# Patient Record
Sex: FEMALE | Race: WHITE | NOT HISPANIC OR LATINO | Employment: OTHER | URBAN - METROPOLITAN AREA
[De-identification: names, ages, dates, MRNs, and addresses within clinical notes are randomized per-mention and may not be internally consistent; named-entity substitution may affect disease eponyms.]

---

## 2017-02-07 DIAGNOSIS — Z12.31 ENCOUNTER FOR SCREENING MAMMOGRAM FOR MALIGNANT NEOPLASM OF BREAST: ICD-10-CM

## 2017-02-07 DIAGNOSIS — Z13.6 ENCOUNTER FOR SCREENING FOR CARDIOVASCULAR DISORDERS: ICD-10-CM

## 2017-02-07 DIAGNOSIS — L30.1 DYSHIDROSIS: ICD-10-CM

## 2017-02-07 DIAGNOSIS — Z00.00 ENCOUNTER FOR GENERAL ADULT MEDICAL EXAMINATION WITHOUT ABNORMAL FINDINGS: ICD-10-CM

## 2017-02-07 DIAGNOSIS — I10 ESSENTIAL (PRIMARY) HYPERTENSION: ICD-10-CM

## 2017-02-07 DIAGNOSIS — M15.9 POLYOSTEOARTHRITIS: ICD-10-CM

## 2017-02-07 DIAGNOSIS — B37.2 CANDIDIASIS OF SKIN AND NAIL: ICD-10-CM

## 2017-02-07 DIAGNOSIS — M54.9 DORSALGIA: ICD-10-CM

## 2017-02-15 ENCOUNTER — ALLSCRIPTS OFFICE VISIT (OUTPATIENT)
Dept: OTHER | Facility: OTHER | Age: 67
End: 2017-02-15

## 2017-03-29 ENCOUNTER — APPOINTMENT (OUTPATIENT)
Dept: LAB | Facility: CLINIC | Age: 67
End: 2017-03-29
Payer: MEDICARE

## 2017-03-29 ENCOUNTER — ALLSCRIPTS OFFICE VISIT (OUTPATIENT)
Dept: OTHER | Facility: OTHER | Age: 67
End: 2017-03-29

## 2017-03-29 ENCOUNTER — TRANSCRIBE ORDERS (OUTPATIENT)
Dept: ADMINISTRATIVE | Facility: HOSPITAL | Age: 67
End: 2017-03-29

## 2017-03-29 ENCOUNTER — HOSPITAL ENCOUNTER (OUTPATIENT)
Dept: RADIOLOGY | Facility: CLINIC | Age: 67
Discharge: HOME | End: 2017-03-29
Payer: MEDICARE

## 2017-03-29 DIAGNOSIS — M12.9 ARTHROPATHY: ICD-10-CM

## 2017-03-29 LAB
ERYTHROCYTE [SEDIMENTATION RATE] IN BLOOD: 26 MM/HOUR (ref 0–20)
URATE SERPL-MCNC: 7.4 MG/DL (ref 2–6.8)

## 2017-03-29 PROCEDURE — 86618 LYME DISEASE ANTIBODY: CPT

## 2017-03-29 PROCEDURE — 73610 X-RAY EXAM OF ANKLE: CPT

## 2017-03-29 PROCEDURE — 84550 ASSAY OF BLOOD/URIC ACID: CPT

## 2017-03-29 PROCEDURE — 36415 COLL VENOUS BLD VENIPUNCTURE: CPT

## 2017-03-29 PROCEDURE — 85652 RBC SED RATE AUTOMATED: CPT

## 2017-03-31 ENCOUNTER — GENERIC CONVERSION - ENCOUNTER (OUTPATIENT)
Dept: OTHER | Facility: OTHER | Age: 67
End: 2017-03-31

## 2017-03-31 LAB
B BURGDOR IGG SER IA-ACNC: 0.13
B BURGDOR IGM SER IA-ACNC: 0.15

## 2017-05-24 ENCOUNTER — GENERIC CONVERSION - ENCOUNTER (OUTPATIENT)
Dept: FAMILY MEDICINE CLINIC | Facility: CLINIC | Age: 67
End: 2017-05-24

## 2017-06-21 ENCOUNTER — HOSPITAL ENCOUNTER (OUTPATIENT)
Dept: MAMMOGRAPHY | Facility: CLINIC | Age: 67
Discharge: HOME | End: 2017-06-21
Payer: MEDICARE

## 2017-06-21 DIAGNOSIS — Z12.31 ENCOUNTER FOR SCREENING MAMMOGRAM FOR MALIGNANT NEOPLASM OF BREAST: ICD-10-CM

## 2017-06-21 PROCEDURE — 77080 DXA BONE DENSITY AXIAL: CPT

## 2017-06-21 PROCEDURE — G0202 SCR MAMMO BI INCL CAD: HCPCS

## 2017-06-27 ENCOUNTER — HOSPITAL ENCOUNTER (OUTPATIENT)
Dept: ULTRASOUND IMAGING | Facility: HOSPITAL | Age: 67
Discharge: HOME | End: 2017-06-27
Payer: MEDICARE

## 2017-06-27 DIAGNOSIS — Z13.6 SCREENING FOR AAA (ABDOMINAL AORTIC ANEURYSM): ICD-10-CM

## 2017-06-27 DIAGNOSIS — R92.8 OTHER ABNORMAL AND INCONCLUSIVE FINDINGS ON DIAGNOSTIC IMAGING OF BREAST: ICD-10-CM

## 2017-06-27 PROCEDURE — 76706 US ABDL AORTA SCREEN AAA: CPT

## 2017-06-30 ENCOUNTER — HOSPITAL ENCOUNTER (OUTPATIENT)
Dept: MAMMOGRAPHY | Facility: CLINIC | Age: 67
Discharge: HOME | End: 2017-06-30
Payer: MEDICARE

## 2017-06-30 ENCOUNTER — HOSPITAL ENCOUNTER (OUTPATIENT)
Dept: ULTRASOUND IMAGING | Facility: CLINIC | Age: 67
Discharge: HOME | End: 2017-06-30
Payer: MEDICARE

## 2017-06-30 DIAGNOSIS — R92.8 OTHER ABNORMAL AND INCONCLUSIVE FINDINGS ON DIAGNOSTIC IMAGING OF BREAST: ICD-10-CM

## 2017-06-30 PROCEDURE — 76642 ULTRASOUND BREAST LIMITED: CPT

## 2017-06-30 PROCEDURE — G0206 DX MAMMO INCL CAD UNI: HCPCS

## 2017-06-30 PROCEDURE — G0279 TOMOSYNTHESIS, MAMMO: HCPCS

## 2017-07-03 ENCOUNTER — GENERIC CONVERSION - ENCOUNTER (OUTPATIENT)
Dept: OTHER | Facility: OTHER | Age: 67
End: 2017-07-03

## 2017-07-20 ENCOUNTER — ALLSCRIPTS OFFICE VISIT (OUTPATIENT)
Dept: OTHER | Facility: OTHER | Age: 67
End: 2017-07-20

## 2017-07-31 ENCOUNTER — GENERIC CONVERSION - ENCOUNTER (OUTPATIENT)
Dept: FAMILY MEDICINE CLINIC | Facility: CLINIC | Age: 67
End: 2017-07-31

## 2017-08-30 ENCOUNTER — GENERIC CONVERSION - ENCOUNTER (OUTPATIENT)
Dept: FAMILY MEDICINE CLINIC | Facility: CLINIC | Age: 67
End: 2017-08-30

## 2017-09-29 ENCOUNTER — GENERIC CONVERSION - ENCOUNTER (OUTPATIENT)
Dept: FAMILY MEDICINE CLINIC | Facility: CLINIC | Age: 67
End: 2017-09-29

## 2017-10-20 ENCOUNTER — ALLSCRIPTS OFFICE VISIT (OUTPATIENT)
Dept: OTHER | Facility: OTHER | Age: 67
End: 2017-10-20

## 2017-10-20 DIAGNOSIS — I10 ESSENTIAL (PRIMARY) HYPERTENSION: ICD-10-CM

## 2017-10-20 DIAGNOSIS — Z12.11 ENCOUNTER FOR SCREENING FOR MALIGNANT NEOPLASM OF COLON: ICD-10-CM

## 2017-10-20 DIAGNOSIS — Z23 ENCOUNTER FOR IMMUNIZATION: ICD-10-CM

## 2017-10-21 NOTE — PROGRESS NOTES
Assessment  1  Benign essential hypertension (401 1) (I10)   2  Generalized osteoarthritis (715 00) (M15 9)   3  Gout (274 9) (M10 9)   4  Flu vaccine need (V04 81) (Z23)    Plan  Benign essential hypertension, Flu vaccine need    · (1) CBC/PLT/DIFF; Status:Active; Requested ZTC:[id number];    · (1) LIPID PANEL, FASTING; Status:Active; Requested for:20Oct2017;    · (1) TSH WITH FT4 REFLEX; Status:Active; Requested for:20Oct2017;   Colon cancer screening    · (1) OCCULT BLOOD, FECAL IMMUNOCHEMICAL TEST; Status:Active; Requested  GXM:15SSR0961;   Flu vaccine need    · (1) COMPREHENSIVE METABOLIC PANEL; Status:Active; Requested for:20Oct2017;    · (1) URINALYSIS w URINE C/S REFLEX (will reflex a microscopy if leukocytes, occult  blood, or nitrites are not within normal limits); Status:Active; Requested for:20Oct2017;    · Fluzone High-Dose 0 5 ML Intramuscular Suspension Prefilled Syringe    Discussion/Summary    Doing wellon health maintenancetest ordered todayshot given todaycurrent medicationsin 6 months  Chief Complaint  3 month follow up for HTN  Patient is here today for follow up of chronic conditions described in HPI  History of Present Illness  here for 3 month checkmuch better since hip replacementother complaints that      Review of Systems    Constitutional: No fever, no chills, feels well, no tiredness, no recent weight gain or weight loss  Eyes: No complaints of eye pain, no red eyes, no eyesight problems, no discharge, no dry eyes, no itching of eyes  ENT: no complaints of earache, no loss of hearing, no nose bleeds, no nasal discharge, no sore throat, no hoarseness  Cardiovascular: No complaints of slow heart rate, no fast heart rate, no chest pain, no palpitations, no leg claudication, no lower extremity edema  Respiratory: No complaints of shortness of breath, no wheezing, no cough, no SOB on exertion, no orthopnea, no PND     Gastrointestinal: No complaints of abdominal pain, no constipation, no nausea or vomiting, no diarrhea, no bloody stools  Genitourinary: No complaints of dysuria, no incontinence, no pelvic pain, no dysmenorrhea, no vaginal discharge or bleeding  Musculoskeletal: as noted in HPI  Integumentary: No complaints of skin rash or lesions, no itching, no skin wounds, no breast pain or lump  Neurological: No complaints of headache, no confusion, no convulsions, no numbness, no dizziness or fainting, no tingling, no limb weakness, no difficulty walking  Psychiatric: Not suicidal, no sleep disturbance, no anxiety or depression, no change in personality, no emotional problems  Endocrine: No complaints of proptosis, no hot flashes, no muscle weakness, no deepening of the voice, no feelings of weakness  Hematologic/Lymphatic: No complaints of swollen glands, no swollen glands in the neck, does not bleed easily, does not bruise easily  Active Problems  1  Abnormal mammogram (793 80) (R92 8)   2  Acute arthropathy (716 90) (M12 9)   3  Back pain, chronic (724 5,338 29) (M54 9,G89 29)   4  Benign essential hypertension (401 1) (I10)   5  Candidal intertrigo (112 3) (B37 2)   6  Chronic sinusitis (473 9) (J32 9)   7  Dyshidrotic eczema (705 81) (L30 1)   8  Generalized osteoarthritis (715 00) (M15 9)   9  Gout (274 9) (M10 9)   10  Hip pain (719 45) (M25 559)   11  Need for Zostavax administration (V04 89) (Z23)   12  Screening for AAA (abdominal aortic aneurysm) (V81 2) (Z13 6)    Past Medical History  1  History of Acute recurrent sinusitis, unspecified location (461 9) (J01 91)   2  Acute sinusitis (461 9) (J01 90)    The active problems and past medical history were reviewed and updated today  Surgical History  1  History of Knee Replacement    The surgical history was reviewed and updated today  Family History  Mother    1  Family history of hypertension (V17 49) (Z82 49)  Family History    2  Family history of Aneurysm Of Abdominal Aorta   3   Family history of Brain Cancer (V16 8)   4  Family history of Hypertension (V17 49)   5  Family history of Stroke Syndrome (V17 1)    The family history was reviewed and updated today  Social History   · Current Every Day Smoker (305 1)  The social history was reviewed and updated today  The social history was reviewed and is unchanged  Current Meds   1  Allopurinol 100 MG Oral Tablet; Take 1 tablet daily; Therapy: 60JRA1219 to (Evaluate:01Apr2018)  Requested for: 93BRS2773; Last   Rx:03Oct2017 Ordered   2  CeleBREX 100 MG Oral Capsule; TAKE 1 CAPSULE TWICE DAILY; Therapy: (Recorded:20Oct2017) to Recorded   3  Clotrimazole-Betamethasone 1-0 05 % External Cream; APPLY SPARINGLY TO THE   AFFECTED AREA(S) TWICE DAILY; Therapy: 86ZDU0738 to (Evaluate:27Wtr1497)  Requested for: 28HRB4058; Last   Rx:29Nov2016 Ordered   4  [facility] Fish Oil 1000 MG Oral Capsule; TAKE 1 CAPSULE Daily Recorded   5  Daily Multiple Vitamins TABS; Take 1 tablet daily Recorded   6  Nystatin 618103 UNIT/GM External Powder; APPLY TO AFFECTED AREA TWICE DAILY AS   DIRECTED; Therapy: 64RXF5128 to (Evaluate:29Mar2017)  Requested for: 34BIJ4779; Last   Rx:29Nov2016 Ordered   7  Zostavax 11800 UNT/0 65ML Subcutaneous Solution Reconstituted; INJECT 0 5 ML   Once; Therapy: 44ZJA3263 to (Evaluate:05Pyq5960); Last Rx:88Fqj9880 Ordered    The medication list was reviewed and updated today  Allergies  1  No Known Drug Allergies    Vitals  Vital Signs    Recorded: 21KFK0779 02:43PM   Temperature 97 8 F, Tympanic   Heart Rate 78   Respiration 16   Systolic 972, LUE, Sitting   Diastolic 84, LUE, Sitting   Height 5 ft 5 in   Weight 143 lb    BMI Calculated 23 8   BSA Calculated 1 72     Physical Exam    Constitutional   General appearance: No acute distress, well appearing and well nourished  Ears, Nose, Mouth, and Throat   Otoscopic examination: Tympanic membranes translucent with normal light reflex  Canals patent without erythema  Oropharynx: Normal with no erythema, edema, exudate or lesions  Pulmonary   Respiratory effort: No increased work of breathing or signs of respiratory distress  Auscultation of lungs: Clear to auscultation  Cardiovascular   Auscultation of heart: Normal rate and rhythm, normal S1 and S2, without murmurs  Carotid pulses: Normal     Abdomen   Abdomen: Non-tender, no masses  Liver and spleen: No hepatomegaly or splenomegaly  Musculoskeletal   Gait and station: Normal     Digits and nails: Normal without clubbing or cyanosis  Inspection/palpation of joints, bones, and muscles: Normal     Skin   Skin and subcutaneous tissue: Normal without rashes or lesions  Neurologic   Cranial nerves: Cranial nerves 2-12 intact  Reflexes: 2+ and symmetric  Sensation: No sensory loss           Future Appointments    Date/Time Provider Specialty Site   04/20/2018 02:15 PM Damaris Minor DO Family Medicine [facility]     Signatures   Electronically signed by : Sujey Peter DO; Oct 20 2017  4:48PM EST                       (Author)

## 2017-11-22 ENCOUNTER — GENERIC CONVERSION - ENCOUNTER (OUTPATIENT)
Dept: OTHER | Facility: OTHER | Age: 67
End: 2017-11-22

## 2018-01-12 NOTE — PROGRESS NOTES
Assessment    1  Encounter for preventive health examination (V70 0) (Z00 00)   2  Need for pneumococcal vaccination (V03 82) (Z23)    Plan  Back pain, chronic, Benign essential hypertension, Candidal intertrigo, Dyshidrotic  eczema, Generalized osteoarthritis    · (1) COMPREHENSIVE METABOLIC PANEL; Status:Canceled;   Back pain, chronic, Benign essential hypertension, Candidal intertrigo, Dyshidrotic  eczema, Generalized osteoarthritis, Screening for cardiovascular condition    · (1) LIPID PANEL, FASTING; Status:Canceled; Benign essential hypertension, Chronic sinusitis, PMH: Encounter for screening for  cardiovascular disorders, PMH: Encounter for screening mammogram for breast cancer,  Generalized osteoarthritis, Hip pain, unspecified laterality    · (1) CBC/PLT/DIFF; Status:Canceled;    · (1) COMPREHENSIVE METABOLIC PANEL; Status:Canceled;    · (1) LIPID PANEL, FASTING; Status:Canceled;    · (1) TSH WITH FT4 REFLEX; Status:Canceled;    · (1) URINALYSIS w URINE C/S REFLEX (will reflex a microscopy if leukocytes, occult  blood, or nitrites are not within normal limits); Status:Canceled; Benign essential hypertension, PMH: Encounter for screening for cardiovascular  disorders    · (1) CBC/PLT/DIFF; Status:Canceled;    · (1) COMPREHENSIVE METABOLIC PANEL; Status:Canceled;    · (1) LIPID PANEL, FASTING; Status:Canceled;    · (1) TSH WITH FT4 REFLEX; Status:Canceled;    · (1) URINALYSIS w URINE C/S REFLEX (will reflex a microscopy if leukocytes, occult  blood, or nitrites are not within normal limits); Status:Canceled;   Chronic sinusitis    · Amoxicillin-Pot Clavulanate 875-125 MG Oral Tablet (Augmentin); TAKE 1 TABLET  EVERY 12 HOURS DAILY  Encounter for screening mammogram for breast cancer    · * DXA BONE DENSITY SPINE HIP AND PELVIS; Status:Active; Requested  for:94Ohd0186;    · * MAMMO SCREENING BILATERAL W CAD; Status:Active;  Requested for:34Oiw9784;   Laboratory examination ordered as part of a complete physical examination    · (1) CBC/PLT/DIFF; Status:Active; Requested for:57Uzb0877;    · (1) COMPREHENSIVE METABOLIC PANEL; Status:Active; Requested for:15Feb2017;    · (1) LIPID PANEL, FASTING; Status:Active; Requested for:15Feb2017;    · (1) TSH WITH FT4 REFLEX; Status:Active; Requested for:15Feb2017;    · (1) URINALYSIS w URINE C/S REFLEX (will reflex a microscopy if leukocytes, occult  blood, or nitrites are not within normal limits); Status:Active; Requested for:15Feb2017;   Need for pneumococcal vaccination    · Prevnar 13 Intramuscular Suspension  Need for Zostavax administration    · Zostavax 51185 UNT/0 65ML Subcutaneous Solution Reconstituted (Zostavax  90888 UNT/0 65ML Subcutaneous Solution Reconstituted); INJECT 0 5 ML Once  PMH: Encounter for screening mammogram for breast cancer    · * MAMMO SCREENING BILATERAL W CAD; Status:Canceled;   Screening for AAA (abdominal aortic aneurysm)    · VAS CMS AAA SCREENING; Status:Active; Requested for:15Feb2017;     Discussion/Summary    Labs ordered again  dexa ordered   mammo ordered   refuising colonoscopy  u/s for anurysm screening ordered  needs to see eye visit  augmentin sent ot pharmacy for sinuisitis  f/u in 6 months  prevnar given today  zostavax script printed  Impression: Welcome to Medicare Visit, with preventive exam as well as age and risk appropriate counseling completed  Cardiovascular screening and counseling: was ordered but patient did not get yet  Diabetes screening and counseling: was ordered but did not get yet  Colorectal cancer screening and counseling: the patient declines screening  Breast cancer screening and counseling: due for a screening mammogram and ordered today  Cervical cancer screening and counseling: the patient declines screening  Osteoporosis screening and counseling: ordered today  Abdominal aortic aneurysm screening and counseling: screening US recommended     Glaucoma screening and counseling: needs to see optho-- [facility]  HIV screening and counseling: screening not indicated  Immunizations: influenza vaccine is up to date this year, the lifetime pneumococcal vaccine has been completed, hepatitis B vaccination series is not indicated at this time due to the patient's low risk of sofiya the disease, Zostavax vaccine needed today, Td vaccination status is unknown and Tdap vaccination status is unknown  Advance Directive Planning: not complete, she was encouraged to follow-up with me to discuss her questions and/or decisions  Advice and education were given regarding fall risk reduction, increasing physical activity and nutrition (non-diabetic)  She was referred to none needed  Patient Discussion: follow-up visit needed in 6 months  Chief Complaint  Welcome to Medicare Wellness Exam 73year old  Advance Directives  Advance Directive [facility]:   NO - Patient does not have an advance health care directive  History of Present Illness  HPI: here for welcome to medicare physical  having some sinus issues today   Welcome to [employer] and Wellness Visits: The patient is being seen for the welcome to medicare visit  Medicare Screening and Risk Factors   Hospitalizations: she has been hospitalized 0 times  Medicare Screening Tests Risk Questions   Abdominal aortic aneurysm risk assessment: none indicated  Osteoporosis risk assessment: , female gender, over 48years of age and tobacco use  HIV risk assessment: none indicated  Drug and Alcohol Use: The patient is a current cigarette smoker  She has smoked for 50+ year(s)  She is cutting back on tobacco use  The patient reports rare alcohol use  She has never used illicit drugs  Diet and Physical Activity: Current diet includes well balanced meals and limited junk food  The patient does not exercise  Mood Disorder and Cognitive Impairment Screening:   Depression screening  negative for symptoms   She denies feeling down, depressed, or hopeless over the past two weeks  She denies feeling little interest or pleasure in doing things over the past two weeks  Cognitive impairment screening: denies difficulty learning/retaining new information, denies difficulty handling complex tasks, denies difficulty with reasoning, denies difficulty with spatial ability and orientation, denies difficulty with language and denies difficulty with behavior  Functional Ability/Level of Safety: Hearing is normal bilaterally, normal in the right ear, normal in the left ear and a hearing aid is not used  The patient is currently able to do activities of daily living without limitations, able to do instrumental activities of daily living without limitations, able to participate in social activities without limitations and able to drive without limitations  Activities of daily living details: does not need help using the phone, no transportation help needed, does not need help shopping, no meal preparation help needed, does not need help doing housework, does not need help doing laundry, does not need help managing medications and does not need help managing money  Fall risk factors:  mobility impairment, but The patient fell 0 times in the past 12 months , no polypharmacy, no alcohol use, no antidepressant use, no deconditioning, no postural hypotension, no sedative use, no visual impairment, no urinary incontinence, no antihypertensive use, no cognitive impairment, up and go test was normal and no previous fall  Home safety risk factors:  no unfamiliar surroundings, no loose rugs, no uneven floors, no household clutter, grab bars in the bathroom and handrails on the stairs  Advance Directives: Advance directives: no living will, no durable power of  for health care directives and no advance directives  end of life decisions were reviewed with the patient and I agree with the patient's decisions     Co-Managers and Medical Equipment/Suppliers: See Patient Care Team   Preventive Quality Program 65 and Older: Falls Risk: The patient fell 0 times in the past 12 months  Review of Systems    Constitutional: negative  Eyes: negative  ENT: negative  Cardiovascular: negative  Respiratory: negative  Gastrointestinal: negative  Genitourinary: negative  Musculoskeletal: negative  Integumentary and Breasts: negative  Neurological: negative  Psychiatric: negative  Endocrine: negative  Hematologic and Lymphatic: negative  Active Problems    1  Back pain, chronic (724 5,338 29) (M54 9,G89 29)   2  Benign essential hypertension (401 1) (I10)   3  Candidal intertrigo (112 3) (B37 2)   4  Chronic sinusitis (473 9) (J32 9)   5  Dyshidrotic eczema (705 81) (L30 1)   6  Generalized osteoarthritis (715 00) (M15 9)   7  Hip pain (719 45) (M25 559)   8  Hip pain, unspecified laterality   9  Screening for cardiovascular condition (V81 2) (Z13 6)    Past Medical History    · History of Acute recurrent sinusitis, unspecified location (461 9) (J01 91)   · Acute sinusitis (461 9) (J01 90)    The active problems and past medical history were reviewed and updated today  Surgical History    · History of Knee Replacement    The surgical history was reviewed and updated today  Family History  Mother    · Family history of hypertension (V17 49) (Z82 49)  Family History    · Family history of Aneurysm Of Abdominal Aorta   · Family history of Brain Cancer (V16 8)   · Family history of Hypertension (V17 49)   · Family history of Stroke Syndrome (V17 1)    The family history was reviewed and updated today  Social History    · Current Every Day Smoker (305 1)  The social history was reviewed and updated today  The social history was reviewed and is unchanged  Current Meds   1  Celecoxib 200 MG Oral Capsule; TAKE 1 CAPSULE Daily  Requested for: 23Nov2016;   Last Rx:23Nov2016 Ordered   2   Clotrimazole-Betamethasone 1-0 05 % External Cream; APPLY SPARINGLY TO THE   AFFECTED AREA(S) TWICE DAILY; Therapy: 52BTN4717 to (Evaluate:31Ama8485)  Requested for: 06NWG4125; Last   Rx:29Nov2016 Ordered   3  [facility] Fish Oil 1000 MG Oral Capsule; TAKE 1 CAPSULE Daily Recorded   4  Cyclobenzaprine HCl - 5 MG Oral Tablet; TAKE 1 TABLET Bedtime; Therapy: 66Voe4813 to (Evaluate:24Mar2017)  Requested for: 89NRW6624; Last   Rx:23Jan2017 Ordered   5  Daily Multiple Vitamins TABS; Take 1 tablet daily Recorded   6  Irbesartan 150 MG Oral Tablet; Take 1 tablet daily  Requested for: 23Nov2016; Last   Rx:23Nov2016 Ordered   7  Nystatin 953460 UNIT/GM External Powder; APPLY TO AFFECTED AREA TWICE DAILY   AS DIRECTED; Therapy: 19POY8767 to (Evaluate:29Mar2017)  Requested for: 51FUZ3217; Last   Rx:29Nov2016 Ordered    The medication list was reviewed and updated today  Allergies    1  No Known Drug Allergies    Immunizations   1 2    Influenza  01-Oct-2013 25-Oct-2016     Vitals  Signs    Heart Rate: 88  Systolic: 634, RUE, Sitting  Diastolic: 76, RUE, Sitting  Height: 5 ft 5 in  Weight: 148 lb 8 0 oz  BMI Calculated: 24 71  BSA Calculated: 1 75    Physical Exam    Constitutional   General appearance: No acute distress, well appearing and well nourished  Eyes   Conjunctiva and lids: No swelling, erythema or discharge  Pupils and irises: Equal, round, reactive to light  Ears, Nose, Mouth, and Throat   External inspection of ears and nose: Normal     Otoscopic examination: Tympanic membranes translucent with normal light reflex  Canals patent without erythema  Hearing: Normal     Nasal mucosa, septum, and turbinates: Normal without edema or erythema  Lips, teeth, and gums: Normal, good dentition  Oropharynx: Normal with no erythema, edema, exudate or lesions  Neck   Neck: Supple, symmetric, trachea midline, no masses  Thyroid: Normal, no thyromegaly      Pulmonary   Respiratory effort: No increased work of breathing or signs of respiratory distress  Auscultation of lungs: Clear to auscultation  Cardiovascular   Auscultation of heart: Normal rate and rhythm, normal S1 and S2, no murmurs  Carotid pulses: 2+ bilaterally  Examination of extremities for edema and/or varicosities: Normal     Abdomen   Abdomen: Non-tender, no masses  Liver and spleen: No hepatomegaly or splenomegaly  Lymphatic   Palpation of lymph nodes in neck: No lymphadenopathy  Musculoskeletal   Gait and station: Normal     Digits and nails: Normal without clubbing or cyanosis  Joints, bones, and muscles: Normal     Range of motion: Normal     Stability: Normal     Muscle strength/tone: Normal     Skin   Skin and subcutaneous tissue: Normal without rashes or lesions  Neurologic   Cranial nerves: Cranial nerves II-XII intact  Cortical function: Normal mental status  Psychiatric   Judgment and insight: Normal     Orientation to person, place, and time: Normal     Recent and remote memory: Intact  Mood and affect: Normal        Procedure    Procedure: Visual Acuity Test    Indication: routine screening  Inforrmation supplied by a Snellen chart  Results: 20/25 in the right eye with corrective device, 20/25 in the left eye with corrective device Wears glasses   The patient was cooperative        Future Appointments    Date/Time Provider Specialty Site   08/16/2017 02:30 PM Edna Minor DO Family Medicine [facility]     Signatures   Electronically signed by : Erik Wilkins DO; Feb 15 2017  4:19PM EST                       (Author)

## 2018-01-12 NOTE — MISCELLANEOUS
Message  patient notified of labs and xray  shows eleavted sed rate and uric acid   xray shows djd of ankle  finish prednisone taper and start allopurinol       Plan  Gout    · Allopurinol 100 MG Oral Tablet;  Take 1 tablet daily    Signatures   Electronically signed by : Yo Isbell DO; Mar 31 2017 12:23PM EST                       (Author)

## 2018-01-13 VITALS
DIASTOLIC BLOOD PRESSURE: 76 MMHG | BODY MASS INDEX: 22.82 KG/M2 | TEMPERATURE: 99.5 F | HEIGHT: 65 IN | WEIGHT: 137 LBS | HEART RATE: 90 BPM | SYSTOLIC BLOOD PRESSURE: 114 MMHG

## 2018-01-13 VITALS
WEIGHT: 148.5 LBS | DIASTOLIC BLOOD PRESSURE: 76 MMHG | BODY MASS INDEX: 24.74 KG/M2 | SYSTOLIC BLOOD PRESSURE: 120 MMHG | HEIGHT: 65 IN | HEART RATE: 88 BPM

## 2018-01-14 VITALS
WEIGHT: 143 LBS | HEART RATE: 78 BPM | SYSTOLIC BLOOD PRESSURE: 124 MMHG | TEMPERATURE: 97.8 F | RESPIRATION RATE: 16 BRPM | DIASTOLIC BLOOD PRESSURE: 84 MMHG | HEIGHT: 65 IN | BODY MASS INDEX: 23.82 KG/M2

## 2018-01-14 VITALS
BODY MASS INDEX: 24.32 KG/M2 | WEIGHT: 146 LBS | SYSTOLIC BLOOD PRESSURE: 120 MMHG | HEIGHT: 65 IN | DIASTOLIC BLOOD PRESSURE: 78 MMHG | HEART RATE: 104 BPM

## 2018-01-16 NOTE — MISCELLANEOUS
Message  patient notified of mammo -- u/s showed likely benign  and will need f/u in 6 months  dexa shwoed osteopenia and we will discuss at next visit        Signatures   Electronically signed by : Darlyn Huber DO; Jul  3 2017  4:36PM EST                       (Author)

## 2018-02-01 DIAGNOSIS — M15.9 PRIMARY OSTEOARTHRITIS INVOLVING MULTIPLE JOINTS: Primary | ICD-10-CM

## 2018-02-01 RX ORDER — CELECOXIB 100 MG/1
100 CAPSULE ORAL 2 TIMES DAILY
Qty: 60 CAPSULE | Refills: 3 | Status: SHIPPED | OUTPATIENT
Start: 2018-02-01 | End: 2018-06-06 | Stop reason: SDUPTHER

## 2018-04-09 DIAGNOSIS — M10.9 GOUT, UNSPECIFIED CAUSE, UNSPECIFIED CHRONICITY, UNSPECIFIED SITE: Primary | ICD-10-CM

## 2018-04-09 RX ORDER — ALLOPURINOL 100 MG/1
100 TABLET ORAL DAILY
Qty: 90 TABLET | Refills: 1 | Status: SHIPPED | OUTPATIENT
Start: 2018-04-09 | End: 2018-10-09 | Stop reason: SDUPTHER

## 2018-04-09 RX ORDER — ALLOPURINOL 100 MG/1
1 TABLET ORAL DAILY
COMMUNITY
Start: 2017-03-31 | End: 2018-04-09 | Stop reason: SDUPTHER

## 2018-04-13 ENCOUNTER — OFFICE VISIT (OUTPATIENT)
Dept: FAMILY MEDICINE CLINIC | Facility: CLINIC | Age: 68
End: 2018-04-13
Payer: MEDICARE

## 2018-04-13 VITALS
DIASTOLIC BLOOD PRESSURE: 92 MMHG | HEIGHT: 65 IN | HEART RATE: 88 BPM | BODY MASS INDEX: 23.82 KG/M2 | SYSTOLIC BLOOD PRESSURE: 140 MMHG | WEIGHT: 143 LBS

## 2018-04-13 DIAGNOSIS — I10 ESSENTIAL HYPERTENSION: Primary | ICD-10-CM

## 2018-04-13 DIAGNOSIS — M10.9 GOUT, UNSPECIFIED CAUSE, UNSPECIFIED CHRONICITY, UNSPECIFIED SITE: ICD-10-CM

## 2018-04-13 DIAGNOSIS — R92.8 ABNORMAL MAMMOGRAM: ICD-10-CM

## 2018-04-13 PROBLEM — M12.9 ACUTE ARTHROPATHY: Status: ACTIVE | Noted: 2017-03-29

## 2018-04-13 PROCEDURE — 99214 OFFICE O/P EST MOD 30 MIN: CPT | Performed by: FAMILY MEDICINE

## 2018-04-13 NOTE — PROGRESS NOTES
Assessment/Plan:       Diagnoses and all orders for this visit:    Essential hypertension    Abnormal mammogram  -     Mammo diagnostic left w cad; Future    Gout, unspecified cause, unspecified chronicity, unspecified site        Up-to-date on health maintenance  Will order left diagnostic mammo  Current 10 U current medications  Follow-up in 6 months or as needed    Subjective:     Chief Complaint   Patient presents with    Follow-up     Check up 6 months         Patient ID: Tk Muller is a 80 y o  female  Here for six-month checkup chronic conditions  No acute complaints today  Patient is feeling very well  Needs x-ray for left diagnostic mammo due to recent abnormal regular mammo        The following portions of the patient's history were reviewed and updated as appropriate: allergies, current medications, past family history, past medical history, past social history, past surgical history and problem list     Review of Systems   Constitutional: Negative  HENT: Negative  Eyes: Negative  Respiratory: Negative  Cardiovascular: Negative  Gastrointestinal: Negative  Endocrine: Negative  Genitourinary: Negative  Musculoskeletal: Negative  Skin: Negative  Allergic/Immunologic: Negative  Neurological: Negative  Hematological: Negative  Psychiatric/Behavioral: Negative  All other systems reviewed and are negative  Objective:    Vitals:    04/13/18 1345   BP: 140/92   BP Location: Left arm   Patient Position: Sitting   Cuff Size: Standard   Pulse: 88   Weight: 64 9 kg (143 lb)   Height: 5' 5" (1 651 m)          Physical Exam   Constitutional: She is oriented to person, place, and time  She appears well-developed and well-nourished  HENT:   Head: Normocephalic and atraumatic     Right Ear: External ear normal    Left Ear: External ear normal    Mouth/Throat: Oropharynx is clear and moist    Eyes: Conjunctivae and EOM are normal  Pupils are equal, round, and reactive to light  Neck: Normal range of motion  Cardiovascular: Normal rate, regular rhythm and normal heart sounds  Pulmonary/Chest: Effort normal and breath sounds normal    Abdominal: Soft  Bowel sounds are normal    Musculoskeletal: Normal range of motion  Neurological: She is alert and oriented to person, place, and time  She has normal reflexes  Skin: Skin is warm and dry  Psychiatric: She has a normal mood and affect  Her behavior is normal  Judgment and thought content normal    Nursing note and vitals reviewed

## 2018-06-06 DIAGNOSIS — M15.9 PRIMARY OSTEOARTHRITIS INVOLVING MULTIPLE JOINTS: ICD-10-CM

## 2018-06-06 RX ORDER — CELECOXIB 100 MG/1
100 CAPSULE ORAL 2 TIMES DAILY
Qty: 180 CAPSULE | Refills: 1 | Status: SHIPPED | OUTPATIENT
Start: 2018-06-06 | End: 2018-10-15 | Stop reason: SDUPTHER

## 2018-10-09 DIAGNOSIS — M10.9 GOUT, UNSPECIFIED CAUSE, UNSPECIFIED CHRONICITY, UNSPECIFIED SITE: ICD-10-CM

## 2018-10-09 RX ORDER — ALLOPURINOL 100 MG/1
100 TABLET ORAL DAILY
Qty: 90 TABLET | Refills: 0 | Status: SHIPPED | OUTPATIENT
Start: 2018-10-09 | End: 2019-01-09 | Stop reason: SDUPTHER

## 2018-10-15 DIAGNOSIS — M15.9 PRIMARY OSTEOARTHRITIS INVOLVING MULTIPLE JOINTS: ICD-10-CM

## 2018-10-15 RX ORDER — CELECOXIB 100 MG/1
100 CAPSULE ORAL 2 TIMES DAILY
Qty: 180 CAPSULE | Refills: 1 | Status: SHIPPED | OUTPATIENT
Start: 2018-10-15 | End: 2019-02-21 | Stop reason: SDUPTHER

## 2018-10-17 ENCOUNTER — OFFICE VISIT (OUTPATIENT)
Dept: FAMILY MEDICINE CLINIC | Facility: CLINIC | Age: 68
End: 2018-10-17
Payer: MEDICARE

## 2018-10-17 VITALS
RESPIRATION RATE: 14 BRPM | HEART RATE: 91 BPM | SYSTOLIC BLOOD PRESSURE: 142 MMHG | OXYGEN SATURATION: 98 % | HEIGHT: 65 IN | DIASTOLIC BLOOD PRESSURE: 92 MMHG | WEIGHT: 141.2 LBS | TEMPERATURE: 98 F | BODY MASS INDEX: 23.53 KG/M2

## 2018-10-17 DIAGNOSIS — Z23 NEED FOR INFLUENZA VACCINATION: ICD-10-CM

## 2018-10-17 DIAGNOSIS — I10 ESSENTIAL HYPERTENSION: ICD-10-CM

## 2018-10-17 DIAGNOSIS — Z13.6 SCREENING FOR CARDIOVASCULAR CONDITION: ICD-10-CM

## 2018-10-17 DIAGNOSIS — M10.9 GOUT, UNSPECIFIED CAUSE, UNSPECIFIED CHRONICITY, UNSPECIFIED SITE: ICD-10-CM

## 2018-10-17 DIAGNOSIS — Z23 NEED FOR PNEUMOCOCCAL VACCINATION: ICD-10-CM

## 2018-10-17 DIAGNOSIS — Z23 NEED FOR SHINGLES VACCINE: ICD-10-CM

## 2018-10-17 DIAGNOSIS — M15.9 GENERALIZED OSTEOARTHRITIS: ICD-10-CM

## 2018-10-17 DIAGNOSIS — Z12.39 SCREENING FOR BREAST CANCER: ICD-10-CM

## 2018-10-17 DIAGNOSIS — Z00.00 MEDICARE ANNUAL WELLNESS VISIT, INITIAL: Primary | ICD-10-CM

## 2018-10-17 PROCEDURE — G0008 ADMIN INFLUENZA VIRUS VAC: HCPCS | Performed by: FAMILY MEDICINE

## 2018-10-17 PROCEDURE — 90662 IIV NO PRSV INCREASED AG IM: CPT | Performed by: FAMILY MEDICINE

## 2018-10-17 PROCEDURE — G0009 ADMIN PNEUMOCOCCAL VACCINE: HCPCS | Performed by: FAMILY MEDICINE

## 2018-10-17 PROCEDURE — G0438 PPPS, INITIAL VISIT: HCPCS | Performed by: FAMILY MEDICINE

## 2018-10-17 PROCEDURE — 99214 OFFICE O/P EST MOD 30 MIN: CPT | Performed by: FAMILY MEDICINE

## 2018-10-17 PROCEDURE — 90732 PPSV23 VACC 2 YRS+ SUBQ/IM: CPT | Performed by: FAMILY MEDICINE

## 2018-10-17 NOTE — PROGRESS NOTES
Assessment/Plan:       Diagnoses and all orders for this visit:      Essential hypertension    Gout, unspecified cause, unspecified chronicity, unspecified site  -     Uric acid; Future    Generalized osteoarthritis    Screening for cardiovascular condition  -     CBC and differential; Future  -     Comprehensive metabolic panel; Future  -     Lipid panel; Future  -     TSH, 3rd generation with Free T4 reflex; Future  -     UA w Reflex to Microscopic w Reflex to Culture; Future    Need for influenza vaccination  -     influenza vaccine, [id number], high-dose, PF 0 5 mL, for patients 65 yr+ (FLUZONE HIGH-DOSE)    Need for pneumococcal vaccination  -     PNEUMOCOCCAL POLYSACCHARIDE VACCINE 23-VALENT =>3YO SQ IM    Need for shingles vaccine  -     Zoster Vac Recomb Adjuvanted 50 MCG SUSR; Inject 1 Dose into a muscle once for 1 dose    Screening for breast cancer  -     Mammo screening bilateral w cad; Future            Subjective:     Chief Complaint   Patient presents with    Follow-up     6 month follow up for HTN        Patient ID: Bashir Barnard is a 80 y o  female  HPI    The following portions of the patient's history were reviewed and updated as appropriate: allergies, current medications, past family history, past medical history, past social history, past surgical history and problem list     Review of Systems   Constitutional: Negative  HENT: Negative  Eyes: Negative  Respiratory: Negative  Cardiovascular: Negative  Gastrointestinal: Negative  Endocrine: Negative  Genitourinary: Negative  Musculoskeletal: Negative  Skin: Negative  Allergic/Immunologic: Negative  Neurological: Negative  Hematological: Negative  Psychiatric/Behavioral: Negative  All other systems reviewed and are negative          Objective:    Vitals:    10/17/18 1411   BP: 142/92   BP Location: Left arm   Patient Position: Sitting   Cuff Size: Standard   Pulse: 91   Resp: 14   Temp: 98 °F (36 7 °C)   TempSrc: Tympanic   SpO2: 98%   Weight: 64 kg (141 lb 3 2 oz)   Height: 5' 5" (1 651 m)          Physical Exam   Constitutional: She is oriented to person, place, and time  She appears well-developed and well-nourished  HENT:   Head: Normocephalic and atraumatic  Right Ear: External ear normal    Left Ear: External ear normal    Mouth/Throat: Oropharynx is clear and moist    Eyes: Pupils are equal, round, and reactive to light  Conjunctivae and EOM are normal    Neck: Normal range of motion  Cardiovascular: Normal rate, regular rhythm and normal heart sounds  Pulmonary/Chest: Effort normal and breath sounds normal    Abdominal: Soft  Bowel sounds are normal    Musculoskeletal: Normal range of motion  Neurological: She is alert and oriented to person, place, and time  She has normal reflexes  Skin: Skin is warm and dry  Psychiatric: She has a normal mood and affect  Her behavior is normal  Judgment and thought content normal    Nursing note and vitals reviewed

## 2018-10-17 NOTE — PROGRESS NOTES
Assessment/Plan:     Diagnoses and all orders for this visit:    Medicare annual wellness visit, initial    Essential hypertension    Gout, unspecified cause, unspecified chronicity, unspecified site  -     Uric acid; Future    Generalized osteoarthritis    Screening for cardiovascular condition  -     CBC and differential; Future  -     Comprehensive metabolic panel; Future  -     Lipid panel; Future  -     TSH, 3rd generation with Free T4 reflex; Future  -     UA w Reflex to Microscopic w Reflex to Culture; Future    Need for influenza vaccination  -     influenza vaccine, [id number], high-dose, PF 0 5 mL, for patients 65 yr+ (FLUZONE HIGH-DOSE)    Need for pneumococcal vaccination  -     PNEUMOCOCCAL POLYSACCHARIDE VACCINE 23-VALENT =>3YO SQ IM    Need for shingles vaccine  -     Zoster Vac Recomb Adjuvanted 50 MCG SUSR; Inject 1 Dose into a muscle once for 1 dose    Screening for breast cancer  -     Mammo screening bilateral w cad; Future            Subjective:     CC: Medicare wellness     Patient ID: Reba Villavicencio is a 80 y o  female  Patient here for medicare wellness visit  See other note for acute and chronic issues  The following portions of the patient's history were reviewed and updated as appropriate: allergies, current medications, past family history, past medical history, past social history, past surgical history and problem list     Review of Systems   Constitutional: Negative  HENT: Negative  Eyes: Negative  Respiratory: Negative  Cardiovascular: Negative  Gastrointestinal: Negative  Negative for bowel incontinence  Endocrine: Negative  Genitourinary: Negative  Musculoskeletal: Negative  Skin: Negative  Allergic/Immunologic: Negative  Neurological: Negative  Hematological: Negative  Psychiatric/Behavioral: Negative  The patient is not nervous/anxious  All other systems reviewed and are negative          Objective:    Vitals:    10/17/18 1411   BP: 142/92   BP Location: Left arm   Patient Position: Sitting   Cuff Size: Standard   Pulse: 91   Resp: 14   Temp: 98 °F (36 7 °C)   TempSrc: Tympanic   SpO2: 98%   Weight: 64 kg (141 lb 3 2 oz)   Height: 5' 5" (1 651 m)          Physical Exam   Constitutional: She is oriented to person, place, and time  She appears well-developed and well-nourished  HENT:   Head: Normocephalic and atraumatic  Right Ear: External ear normal    Left Ear: External ear normal    Mouth/Throat: Oropharynx is clear and moist    Eyes: Pupils are equal, round, and reactive to light  Conjunctivae and EOM are normal    Neck: Normal range of motion  Cardiovascular: Normal rate, regular rhythm and normal heart sounds  Pulmonary/Chest: Effort normal and breath sounds normal    Abdominal: Soft  Bowel sounds are normal    Musculoskeletal: Normal range of motion  Neurological: She is alert and oriented to person, place, and time  She has normal reflexes  Skin: Skin is warm and dry  Psychiatric: She has a normal mood and affect  Her behavior is normal  Judgment and thought content normal    Nursing note and vitals reviewed        Assessment and Plan:    Problem List Items Addressed This Visit     Generalized osteoarthritis    Gout    Relevant Orders    Uric acid    HTN (hypertension)      Other Visit Diagnoses     Medicare annual wellness visit, initial    -  Primary    Screening for cardiovascular condition        Relevant Orders    CBC and differential    Comprehensive metabolic panel    Lipid panel    TSH, 3rd generation with Free T4 reflex    UA w Reflex to Microscopic w Reflex to Culture    Need for influenza vaccination        Relevant Orders    influenza vaccine, [id number], high-dose, PF 0 5 mL, for patients 65 yr+ (FLUZONE HIGH-DOSE) (Completed)    Need for pneumococcal vaccination        Relevant Orders    PNEUMOCOCCAL POLYSACCHARIDE VACCINE 23-VALENT =>1YO SQ IM (Completed)    Need for shingles vaccine        Relevant Medications    Zoster Vac Recomb Adjuvanted 48 MCG SUSR    Screening for breast cancer        Relevant Orders    Mammo screening bilateral w cad        Health Maintenance Due   Topic Date Due    CRC Screening: Colonoscopy  1950    DTaP,Tdap,and Td Vaccines (1 - Tdap) 03/22/1972    Fall Risk  03/22/2016    Urinary Incontinence Screening  03/22/2016    Pneumococcal PPSV23/PCV13 65+ Years / Low and Medium Risk (2 of 2 - PPSV23) 02/15/2018    INFLUENZA VACCINE  07/01/2018         HPI:  Reba Villavicencio is a 80 y o  female here for her Subsequent Wellness Visit  Patient Active Problem List   Diagnosis    Abnormal mammogram    Acute arthropathy    Back pain, chronic    Benign essential hypertension    Candidal intertrigo    Chronic sinusitis    Dyshidrotic eczema    Generalized osteoarthritis    Gout    Hip pain    HTN (hypertension)     No past medical history on file  Past Surgical History:   Procedure Laterality Date    REPLACEMENT TOTAL KNEE       Family History   Problem Relation Age of Onset    Hypertension Mother     Aortic aneurysm Family         abdominal    Brain cancer Family     Hypertension Family     Stroke Family         snydrome     History   Smoking Status    Current Every Day Smoker   Smokeless Tobacco    Never Used     History   Alcohol use Not on file      History   Drug use: Unknown       Current Outpatient Prescriptions   Medication Sig Dispense Refill    allopurinol (ZYLOPRIM) 100 mg tablet Take 1 tablet (100 mg total) by mouth daily 90 tablet 0    celecoxib (CeleBREX) 100 mg capsule Take 1 capsule (100 mg total) by mouth 2 (two) times a day 180 capsule 1    Zoster Vac Recomb Adjuvanted 50 MCG SUSR Inject 1 Dose into a muscle once for 1 dose 1 each 0     No current facility-administered medications for this visit        Allergies   Allergen Reactions    Codeine GI Intolerance    Medical Tape Other (See Comments)     "redness"     Immunization History Administered Date(s) Administered    Influenza Split High Dose Preservative Free IM 10/25/2016, 10/20/2017    Influenza TIV (IM) 10/01/2013    Influenza, high dose seasonal 0 5 mL 10/17/2018    Pneumococcal Conjugate 13-Valent 02/15/2017    Pneumococcal Polysaccharide PPV23 10/17/2018       Patient Care Team:  Manny Senior DO as PCP - General    Medicare Screening Tests and Risk Assessments:  Last Medicare Wellness visit information reviewed, patient interviewed and updates made to the record today  Health Risk Assessment:  Patient rates overall health as good  Patient feels that their physical health rating is Slightly better  Eyesight was rated as Same  Hearing was rated as Same  Patient feels that their emotional and mental health rating is Same  Pain experienced by patient in the last 7 days has been None  Emotional/Mental Health:  Patient has been feeling nervous/anxious  PHQ-9 Depression Screening:    Frequency of the following problems over the past two weeks:      1  Little interest or pleasure in doing things: 0 - not at all      2  Feeling down, depressed, or hopeless: 0 - not at all  PHQ-2 Score: 0          Broken Bones/Falls: Fall Risk Assessment:    In the past year, patient has experienced: No history of falling in past year          Bladder/Bowel:  Patient has not leaked urine accidently in the last six months  Patient reports no loss of bowel control  Immunizations:  Patient has had a flu vaccination within the last year  Patient has received a pneumonia shot  Patient has not received a shingles shot  Patient has not received tetanus/diphtheria shot  Home Safety:  Patient does not have trouble with stairs inside or outside of their home  Patient currently reports that there are no safety hazards present in home, working smoke alarms, working carbon monoxide detectors        Preventative Screenings:   No breast cancer screening performed, no colon cancer screen completed, cholesterol screen completed, glaucoma eye exam completed,     Nutrition:  Current diet: Regular with servings of the following:    Medications:  Patient is not currently taking any over-the-counter supplements  Patient is able to manage medications  Lifestyle Choices:  Patient reports current tobacco use  Patient reports no alcohol use  Patient drives a vehicle  Patient wears seat belt  Activities of Daily Living:  Can get out of bed by his or her self, able to dress self, able to make own meals, able to do own shopping, able to bathe self, can do own laundry/housekeeping, can manage own money, pay bills and track expenses    Previous Hospitalizations:  No hospitalization or ED visit in past 12 months        Advanced Directives:  Patient has not decided on power of   Patient has not completed advanced directive  Preventative Screening/Counseling:      Cardiovascular:      General: Screening Current          Diabetes:      General: Screening Current          Colorectal Cancer:      General: Patient Declines          Cervical Cancer:      General: Screening Not Indicated          Osteoporosis:      Comments: dexa ordered        AAA:      General: Screening Not Indicated          Glaucoma:      Comments: Due for visit        HIV:      General: Screening Not Indicated          Hepatitis C:      General: Screening Not Indicated        Advanced Directives:   Patient has no living will for healthcare, does not have durable POA for healthcare, patient does not have an advanced directive  Information on ACP and/or AD not provided  No 5 wishes given  End of life assessment reviewed with patient  Provider agrees with end of life decisions        Immunizations:      Influenza: Influenza UTD This Year      Pneumococcal: Lifetime Vaccine Completed      Shingrix: Shingrix Vaccine Needed Today      Hepatitis B (Low risk patients): Series Not Indicated      Zostavax: Patient Declines      TD: Vaccine Status Unknown      TDAP: Vaccine Status Unknown      Other Preventative Counseling (Non-Medicare):   Fall Prevention, Increase physical activity, Nutrition Counseling and Car/seat belt/driving safety reviewed

## 2019-01-09 DIAGNOSIS — M10.9 GOUT, UNSPECIFIED CAUSE, UNSPECIFIED CHRONICITY, UNSPECIFIED SITE: ICD-10-CM

## 2019-01-09 RX ORDER — ALLOPURINOL 100 MG/1
100 TABLET ORAL DAILY
Qty: 90 TABLET | Refills: 1 | Status: SHIPPED | OUTPATIENT
Start: 2019-01-09 | End: 2019-07-09 | Stop reason: SDUPTHER

## 2019-02-21 DIAGNOSIS — M15.9 PRIMARY OSTEOARTHRITIS INVOLVING MULTIPLE JOINTS: ICD-10-CM

## 2019-02-21 RX ORDER — CELECOXIB 100 MG/1
100 CAPSULE ORAL 2 TIMES DAILY
Qty: 180 CAPSULE | Refills: 1 | Status: SHIPPED | OUTPATIENT
Start: 2019-02-21 | End: 2019-08-26 | Stop reason: SDUPTHER

## 2019-06-20 ENCOUNTER — OFFICE VISIT (OUTPATIENT)
Dept: FAMILY MEDICINE CLINIC | Facility: CLINIC | Age: 69
End: 2019-06-20
Payer: MEDICARE

## 2019-06-20 VITALS
SYSTOLIC BLOOD PRESSURE: 124 MMHG | TEMPERATURE: 98.4 F | OXYGEN SATURATION: 98 % | HEART RATE: 96 BPM | WEIGHT: 138 LBS | BODY MASS INDEX: 22.99 KG/M2 | DIASTOLIC BLOOD PRESSURE: 86 MMHG | HEIGHT: 65 IN

## 2019-06-20 DIAGNOSIS — J01.91 ACUTE RECURRENT SINUSITIS, UNSPECIFIED LOCATION: Primary | ICD-10-CM

## 2019-06-20 PROCEDURE — 99213 OFFICE O/P EST LOW 20 MIN: CPT | Performed by: FAMILY MEDICINE

## 2019-06-20 RX ORDER — MULTIVITAMIN/IRON/FOLIC ACID 18MG-0.4MG
1 TABLET ORAL DAILY
COMMUNITY

## 2019-06-20 RX ORDER — METHYLPREDNISOLONE 4 MG/1
TABLET ORAL
Qty: 21 EACH | Refills: 0 | Status: SHIPPED | OUTPATIENT
Start: 2019-06-20 | End: 2021-11-08 | Stop reason: ALTCHOICE

## 2019-06-20 RX ORDER — AMOXICILLIN AND CLAVULANATE POTASSIUM 875; 125 MG/1; MG/1
1 TABLET, FILM COATED ORAL EVERY 12 HOURS SCHEDULED
Qty: 14 TABLET | Refills: 0 | Status: SHIPPED | OUTPATIENT
Start: 2019-06-20 | End: 2019-06-27

## 2019-07-09 DIAGNOSIS — M10.9 GOUT, UNSPECIFIED CAUSE, UNSPECIFIED CHRONICITY, UNSPECIFIED SITE: ICD-10-CM

## 2019-07-09 RX ORDER — ALLOPURINOL 100 MG/1
100 TABLET ORAL DAILY
Qty: 90 TABLET | Refills: 1 | Status: SHIPPED | OUTPATIENT
Start: 2019-07-09 | End: 2020-01-06 | Stop reason: SDUPTHER

## 2019-08-26 DIAGNOSIS — M15.9 PRIMARY OSTEOARTHRITIS INVOLVING MULTIPLE JOINTS: ICD-10-CM

## 2019-08-26 RX ORDER — CELECOXIB 100 MG/1
100 CAPSULE ORAL 2 TIMES DAILY
Qty: 180 CAPSULE | Refills: 1 | Status: SHIPPED | OUTPATIENT
Start: 2019-08-26 | End: 2020-03-04 | Stop reason: SDUPTHER

## 2019-10-23 ENCOUNTER — IMMUNIZATIONS (OUTPATIENT)
Dept: FAMILY MEDICINE CLINIC | Facility: CLINIC | Age: 69
End: 2019-10-23
Payer: MEDICARE

## 2019-10-23 DIAGNOSIS — Z23 ENCOUNTER FOR IMMUNIZATION: ICD-10-CM

## 2019-10-23 PROCEDURE — G0008 ADMIN INFLUENZA VIRUS VAC: HCPCS

## 2019-10-23 PROCEDURE — 90662 IIV NO PRSV INCREASED AG IM: CPT

## 2019-12-05 ENCOUNTER — TELEPHONE (OUTPATIENT)
Dept: FAMILY MEDICINE CLINIC | Facility: CLINIC | Age: 69
End: 2019-12-05

## 2020-01-06 DIAGNOSIS — M10.9 GOUT, UNSPECIFIED CAUSE, UNSPECIFIED CHRONICITY, UNSPECIFIED SITE: ICD-10-CM

## 2020-01-06 RX ORDER — ALLOPURINOL 100 MG/1
100 TABLET ORAL DAILY
Qty: 90 TABLET | Refills: 1 | Status: SHIPPED | OUTPATIENT
Start: 2020-01-06 | End: 2020-07-07 | Stop reason: SDUPTHER

## 2020-03-04 DIAGNOSIS — M15.9 PRIMARY OSTEOARTHRITIS INVOLVING MULTIPLE JOINTS: ICD-10-CM

## 2020-03-04 RX ORDER — CELECOXIB 100 MG/1
100 CAPSULE ORAL 2 TIMES DAILY
Qty: 180 CAPSULE | Refills: 1 | Status: SHIPPED | OUTPATIENT
Start: 2020-03-04 | End: 2020-09-15 | Stop reason: SDUPTHER

## 2020-07-07 ENCOUNTER — OFFICE VISIT (OUTPATIENT)
Dept: FAMILY MEDICINE CLINIC | Facility: CLINIC | Age: 70
End: 2020-07-07
Payer: MEDICARE

## 2020-07-07 VITALS
OXYGEN SATURATION: 97 % | TEMPERATURE: 98 F | HEART RATE: 89 BPM | HEIGHT: 65 IN | WEIGHT: 144.8 LBS | DIASTOLIC BLOOD PRESSURE: 82 MMHG | SYSTOLIC BLOOD PRESSURE: 138 MMHG | RESPIRATION RATE: 14 BRPM | BODY MASS INDEX: 24.12 KG/M2

## 2020-07-07 DIAGNOSIS — Z11.59 ENCOUNTER FOR HEPATITIS C SCREENING TEST FOR LOW RISK PATIENT: ICD-10-CM

## 2020-07-07 DIAGNOSIS — Z00.00 MEDICARE ANNUAL WELLNESS VISIT, SUBSEQUENT: Primary | ICD-10-CM

## 2020-07-07 DIAGNOSIS — Z12.11 SCREENING FOR COLON CANCER: Primary | ICD-10-CM

## 2020-07-07 DIAGNOSIS — Z79.899 HIGH RISK MEDICATION USE: ICD-10-CM

## 2020-07-07 DIAGNOSIS — Z12.39 SCREENING FOR BREAST CANCER: ICD-10-CM

## 2020-07-07 DIAGNOSIS — Z13.6 SCREENING FOR CARDIOVASCULAR CONDITION: ICD-10-CM

## 2020-07-07 DIAGNOSIS — M10.9 GOUT, UNSPECIFIED CAUSE, UNSPECIFIED CHRONICITY, UNSPECIFIED SITE: ICD-10-CM

## 2020-07-07 PROCEDURE — 4040F PNEUMOC VAC/ADMIN/RCVD: CPT | Performed by: FAMILY MEDICINE

## 2020-07-07 PROCEDURE — 3075F SYST BP GE 130 - 139MM HG: CPT | Performed by: FAMILY MEDICINE

## 2020-07-07 PROCEDURE — G0439 PPPS, SUBSEQ VISIT: HCPCS | Performed by: FAMILY MEDICINE

## 2020-07-07 PROCEDURE — 3079F DIAST BP 80-89 MM HG: CPT | Performed by: FAMILY MEDICINE

## 2020-07-07 PROCEDURE — 4004F PT TOBACCO SCREEN RCVD TLK: CPT | Performed by: FAMILY MEDICINE

## 2020-07-07 PROCEDURE — 3008F BODY MASS INDEX DOCD: CPT | Performed by: FAMILY MEDICINE

## 2020-07-07 PROCEDURE — 1125F AMNT PAIN NOTED PAIN PRSNT: CPT | Performed by: FAMILY MEDICINE

## 2020-07-07 PROCEDURE — 1160F RVW MEDS BY RX/DR IN RCRD: CPT | Performed by: FAMILY MEDICINE

## 2020-07-07 PROCEDURE — 1123F ACP DISCUSS/DSCN MKR DOCD: CPT | Performed by: FAMILY MEDICINE

## 2020-07-07 PROCEDURE — 1170F FXNL STATUS ASSESSED: CPT | Performed by: FAMILY MEDICINE

## 2020-07-07 RX ORDER — ALLOPURINOL 100 MG/1
100 TABLET ORAL DAILY
Qty: 90 TABLET | Refills: 1 | Status: SHIPPED | OUTPATIENT
Start: 2020-07-07 | End: 2020-09-28 | Stop reason: SDUPTHER

## 2020-07-07 NOTE — PATIENT INSTRUCTIONS
Medicare Preventive Visit Patient Instructions  Thank you for completing your Welcome to Medicare Visit or Medicare Annual Wellness Visit today  Your next wellness visit will be due in one year (7/7/2021)  The screening/preventive services that you may require over the next 5-10 years are detailed below  Some tests may not apply to you based off risk factors and/or age  Screening tests ordered at today's visit but not completed yet may show as past due  Also, please note that scanned in results may not display below  Preventive Screenings:  Service Recommendations Previous Testing/Comments   Colorectal Cancer Screening  * Colonoscopy    * Fecal Occult Blood Test (FOBT)/Fecal Immunochemical Test (FIT)  * Fecal DNA/Cologuard Test  * Flexible Sigmoidoscopy Age: 54-65 years old   Colonoscopy: every 10 years (may be performed more frequently if at higher risk)  OR  FOBT/FIT: every 1 year  OR  Cologuard: every 3 years  OR  Sigmoidoscopy: every 5 years  Screening may be recommended earlier than age 48 if at higher risk for colorectal cancer  Also, an individualized decision between you and your healthcare provider will decide whether screening between the ages of 74-80 would be appropriate  Colonoscopy: Not on file  FOBT/FIT: Not on file  Cologuard: Not on file  Sigmoidoscopy: Not on file    Risks and Benefits Discussed     Breast Cancer Screening Age: 36 years old  Frequency: every 1-2 years  Not required if history of left and right mastectomy Mammogram: 06/30/2017    Risks and Benefits Discussed   Cervical Cancer Screening Between the ages of 21-29, pap smear recommended once every 3 years  Between the ages of 33-67, can perform pap smear with HPV co-testing every 5 years     Recommendations may differ for women with a history of total hysterectomy, cervical cancer, or abnormal pap smears in past  Pap Smear: Not on file    Screening Not Indicated   Hepatitis C Screening Once for adults born between 1945 and 1965  More frequently in patients at high risk for Hepatitis C Hep C Antibody: Not on file       Diabetes Screening 1-2 times per year if you're at risk for diabetes or have pre-diabetes Fasting glucose: No results in last 5 years   A1C: No results in last 5 years    Risks and Benefits Discussed   Cholesterol Screening Once every 5 years if you don't have a lipid disorder  May order more often based on risk factors  Lipid panel: Not on file    Risks and Benefits Discussed     Other Preventive Screenings Covered by Medicare:  1  Abdominal Aortic Aneurysm (AAA) Screening: covered once if your at risk  You're considered to be at risk if you have a family history of AAA  2  Lung Cancer Screening: covers low dose CT scan once per year if you meet all of the following conditions: (1) Age 50-69; (2) No signs or symptoms of lung cancer; (3) Current smoker or have quit smoking within the last 15 years; (4) You have a tobacco smoking history of at least 30 pack years (packs per day multiplied by number of years you smoked); (5) You get a written order from a healthcare provider  3  Glaucoma Screening: covered annually if you're considered high risk: (1) You have diabetes OR (2) Family history of glaucoma OR (3)  aged 48 and older OR (3)  American aged 72 and older  3  Osteoporosis Screening: covered every 2 years if you meet one of the following conditions: (1) You're estrogen deficient and at risk for osteoporosis based off medical history and other findings; (2) Have a vertebral abnormality; (3) On glucocorticoid therapy for more than 3 months; (4) Have primary hyperparathyroidism; (5) On osteoporosis medications and need to assess response to drug therapy  · Last bone density test (DXA Scan): 06/21/2017  5  HIV Screening: covered annually if you're between the age of 12-76  Also covered annually if you are younger than 13 and older than 72 with risk factors for HIV infection   For pregnant patients, it is covered up to 3 times per pregnancy  Immunizations:  Immunization Recommendations   Influenza Vaccine Annual influenza vaccination during flu season is recommended for all persons aged >= 6 months who do not have contraindications   Pneumococcal Vaccine (Prevnar and Pneumovax)  * Prevnar = PCV13  * Pneumovax = PPSV23   Adults 25-60 years old: 1-3 doses may be recommended based on certain risk factors  Adults 72 years old: Prevnar (PCV13) vaccine recommended followed by Pneumovax (PPSV23) vaccine  If already received PPSV23 since turning 65, then PCV13 recommended at least one year after PPSV23 dose  Hepatitis B Vaccine 3 dose series if at intermediate or high risk (ex: diabetes, end stage renal disease, liver disease)   Tetanus (Td) Vaccine - COST NOT COVERED BY MEDICARE PART B Following completion of primary series, a booster dose should be given every 10 years to maintain immunity against tetanus  Td may also be given as tetanus wound prophylaxis  Tdap Vaccine - COST NOT COVERED BY MEDICARE PART B Recommended at least once for all adults  For pregnant patients, recommended with each pregnancy  Shingles Vaccine (Shingrix) - COST NOT COVERED BY MEDICARE PART B  2 shot series recommended in those aged 48 and above     Health Maintenance Due:      Topic Date Due    Hepatitis C Screening  1950    CRC Screening: Colonoscopy  1950    MAMMOGRAM  06/30/2018     Immunizations Due:      Topic Date Due    Influenza Vaccine  07/01/2020     Advance Directives   What are advance directives? Advance directives are legal documents that state your wishes and plans for medical care  These plans are made ahead of time in case you lose your ability to make decisions for yourself  Advance directives can apply to any medical decision, such as the treatments you want, and if you want to donate organs  What are the types of advance directives?   There are many types of advance directives, and each state has rules about how to use them  You may choose a combination of any of the following:  · Living will: This is a written record of the treatment you want  You can also choose which treatments you do not want, which to limit, and which to stop at a certain time  This includes surgery, medicine, IV fluid, and tube feedings  · Durable power of  for healthcare [facility]): This is a written record that states who you want to make healthcare choices for you when you are unable to make them for yourself  This person, called a proxy, is usually a family member or a friend  You may choose more than 1 proxy  · Do not resuscitate (DNR) order:  A DNR order is used in case your heart stops beating or you stop breathing  It is a request not to have certain forms of treatment, such as CPR  A DNR order may be included in other types of advance directives  · Medical directive: This covers the care that you want if you are in a coma, near death, or unable to make decisions for yourself  You can list the treatments you want for each condition  Treatment may include pain medicine, surgery, blood transfusions, dialysis, IV or tube feedings, and a ventilator (breathing machine)  · Values history: This document has questions about your views, beliefs, and how you feel and think about life  This information can help others choose the care that you would choose  Why are advance directives important? An advance directive helps you control your care  Although spoken wishes may be used, it is better to have your wishes written down  Spoken wishes can be misunderstood, or not followed  Treatments may be given even if you do not want them  An advance directive may make it easier for your family to make difficult choices about your care  Cigarette Smoking and Your Health   Risks to your health if you smoke:  Nicotine and other chemicals found in tobacco damage every cell in your body   Even if you are a light smoker, you have an increased risk for cancer, heart disease, and lung disease  If you are pregnant or have diabetes, smoking increases your risk for complications  Benefits to your health if you stop smoking:   · You decrease respiratory symptoms such as coughing, wheezing, and shortness of breath  · You reduce your risk for cancers of the lung, mouth, throat, kidney, bladder, pancreas, stomach, and cervix  If you already have cancer, you increase the benefits of chemotherapy  You also reduce your risk for cancer returning or a second cancer from developing  · You reduce your risk for heart disease, blood clots, heart attack, and stroke  · You reduce your risk for lung infections, and diseases such as pneumonia, asthma, chronic bronchitis, and emphysema  · Your circulation improves  More oxygen can be delivered to your body  If you have diabetes, you lower your risk for complications, such as kidney, artery, and eye diseases  You also lower your risk for nerve damage  Nerve damage can lead to amputations, poor vision, and blindness  · You improve your body's ability to heal and to fight infections  For more information and support to stop smoking:   · Smokefree  gov  Phone: 5- 100 - 210-8093  Web Address: www [employer]  [address] [place], [place] [address]neftali 2018 Information is for End User's use only and may not be sold, redistributed or otherwise used for commercial purposes   All illustrations and images included in CareNotes® are the copyrighted property of A D A M , Inc  or [address] [place] [address]

## 2020-07-07 NOTE — PROGRESS NOTES
Assessment/Plan:     Diagnoses and all orders for this visit:    Medicare annual wellness visit, subsequent      Medicare wellness visit completed  Refilled her uric acid  Mammogram ordered  Cologuard ordered  Labs ordered  She is up-to-date on health maintenance  She can follow up in 1 year sooner if needed depending upon her results    Subjective:     Cc: Medicare wellness visit     Patient ID: Christiane Keller is a 72 y o  female  Patient is here for Medicare wellness visit  She reports no acute physical complaints today      The following portions of the patient's history were reviewed and updated as appropriate: allergies, current medications, past family history, past medical history, past social history, past surgical history and problem list     Review of Systems   Constitutional: Negative  HENT: Negative  Eyes: Negative  Respiratory: Negative  Cardiovascular: Negative  Gastrointestinal: Negative  Endocrine: Negative  Genitourinary: Negative  Musculoskeletal: Negative  Skin: Negative  Allergic/Immunologic: Negative  Neurological: Negative  Hematological: Negative  Psychiatric/Behavioral: Negative  All other systems reviewed and are negative  Objective:    Vitals:    07/07/20 1157   BP: 138/82   BP Location: Right arm   Patient Position: Sitting   Cuff Size: Standard   Pulse: 89   Resp: 14   Temp: 98 °F (36 7 °C)   TempSrc: Tympanic   SpO2: 97%   Weight: 65 7 kg (144 lb 12 8 oz)   Height: 5' 5" (1 651 m)          Physical Exam   Constitutional: She is oriented to person, place, and time  She appears well-developed and well-nourished  HENT:   Head: Normocephalic and atraumatic  Right Ear: External ear normal    Left Ear: External ear normal    Mouth/Throat: Oropharynx is clear and moist    Eyes: Pupils are equal, round, and reactive to light  Conjunctivae and EOM are normal    Neck: Normal range of motion     Cardiovascular: Normal rate, regular rhythm and normal heart sounds  Pulmonary/Chest: Effort normal and breath sounds normal    Abdominal: Soft  Bowel sounds are normal    Musculoskeletal: Normal range of motion  Neurological: She is alert and oriented to person, place, and time  She has normal reflexes  Skin: Skin is warm and dry  Psychiatric: She has a normal mood and affect  Her behavior is normal  Judgment and thought content normal    Nursing note and vitals reviewed  Assessment and Plan:     Problem List Items Addressed This Visit     None      Visit Diagnoses     Medicare annual wellness visit, subsequent    -  Primary          Tobacco Cessation Counseling: Tobacco cessation counseling was provided  The patient is sincerely urged to quit consumption of tobacco  She is not ready to quit tobacco      Preventive health issues were discussed with patient, and age appropriate screening tests were ordered as noted in patient's After Visit Summary  Personalized health advice and appropriate referrals for health education or preventive services given if needed, as noted in patient's After Visit Summary  History of Present Illness:     Patient presents for Medicare Annual Wellness visit    Patient Care Team:  Barb Schreiber DO as PCP - General     Problem List:     Patient Active Problem List   Diagnosis    Abnormal mammogram    Acute arthropathy    Back pain, chronic    Benign essential hypertension    Candidal intertrigo    Chronic sinusitis    Dyshidrotic eczema    Generalized osteoarthritis    Gout    Hip pain    HTN (hypertension)      Past Medical and Surgical History:     No past medical history on file    Past Surgical History:   Procedure Laterality Date    REPLACEMENT TOTAL KNEE        Family History:     Family History   Problem Relation Age of Onset    Hypertension Mother     Aortic aneurysm Family         abdominal    Brain cancer Family     Hypertension Family     Stroke Family         snydrome      Social History:        Social History     Socioeconomic History    Marital status: /Civil Union     Spouse name: Not on file    Number of children: Not on file    Years of education: Not on file    Highest education level: Not on file   Occupational History    Not on file   Social Needs    Financial resource strain: Not on file    Food insecurity:     Worry: Not on file     Inability: Not on file    Transportation needs:     Medical: Not on file     Non-medical: Not on file   Tobacco Use    Smoking status: Current Every Day Smoker    Smokeless tobacco: Never Used   Substance and Sexual Activity    Alcohol use: Not on file    Drug use: Not on file    Sexual activity: Not on file   Lifestyle    Physical activity:     Days per week: Not on file     Minutes per session: Not on file    Stress: Not on file   Relationships    Social connections:     Talks on phone: Not on file     Gets together: Not on file     Attends Buddhism service: Not on file     Active member of club or organization: Not on file     Attends meetings of clubs or organizations: Not on file     Relationship status: Not on file    Intimate partner violence:     Fear of current or ex partner: Not on file     Emotionally abused: Not on file     Physically abused: Not on file     Forced sexual activity: Not on file   Other Topics Concern    Not on file   Social History Narrative    Not on file      Medications and Allergies:     Current Outpatient Medications   Medication Sig Dispense Refill    allopurinol (ZYLOPRIM) 100 mg tablet Take 1 tablet (100 mg total) by mouth daily 90 tablet 1    celecoxib (CeleBREX) 100 mg capsule Take 1 capsule (100 mg total) by mouth 2 (two) times a day 180 capsule 1    DAILY MULTIPLE VITAMINS/IRON TABS Take 1 tablet by mouth daily      methylPREDNISolone 4 MG tablet therapy pack Use as directed on package 21 each 0     No current facility-administered medications for this visit        Allergies Allergen Reactions    Codeine GI Intolerance    Medical Tape Other (See Comments)     "redness"      Immunizations:     Immunization History   Administered Date(s) Administered    Influenza Split High Dose Preservative Free IM 10/25/2016, 10/20/2017    Influenza TIV (IM) 10/01/2013    Influenza, high dose seasonal 0 5 mL 10/17/2018, 10/23/2019    Pneumococcal Conjugate 13-Valent 02/15/2017    Pneumococcal Polysaccharide PPV23 10/17/2018      Health Maintenance:         Topic Date Due    Hepatitis C Screening  1950    CRC Screening: Colonoscopy  1950    MAMMOGRAM  06/30/2018         Topic Date Due    DTaP,Tdap,and Td Vaccines (1 - Tdap) 03/22/1962    Influenza Vaccine  07/01/2020      Medicare Health Risk Assessment:     /82 (BP Location: Right arm, Patient Position: Sitting, Cuff Size: Standard)   Pulse 89   Temp 98 °F (36 7 °C) (Tympanic)   Resp 14   Ht 5' 5" (1 651 m)   Wt 65 7 kg (144 lb 12 8 oz)   SpO2 97%   BMI 24 10 kg/m²      Shan Thorne is here for her Subsequent Wellness visit  Last Medicare Wellness visit information reviewed, patient interviewed and updates made to the record today  Health Risk Assessment:   Patient rates overall health as very good  Patient feels that their physical health rating is same  Eyesight was rated as same  Hearing was rated as same  Patient feels that their emotional and mental health rating is same  Pain experienced in the last 7 days has been none  Patient states that she has experienced no weight loss or gain in last 6 months  Depression Screening:   PHQ-2 Score: 0      Fall Risk Screening: In the past year, patient has experienced: no history of falling in past year      Urinary Incontinence Screening:   Patient has not leaked urine accidently in the last six months  Home Safety:  Patient does not have trouble with stairs inside or outside of their home   Patient has working smoke alarms and has working carbon monoxide detector  Home safety hazards include: none  Nutrition:   Current diet is Regular  Medications:   Patient is currently taking over-the-counter supplements  OTC medications include: see medication list  Patient is able to manage medications  Activities of Daily Living (ADLs)/Instrumental Activities of Daily Living (IADLs):   Walk and transfer into and out of bed and chair?: Yes  Dress and groom yourself?: Yes    Bathe or shower yourself?: Yes    Feed yourself?  Yes  Do your laundry/housekeeping?: Yes  Manage your money, pay your bills and track your expenses?: Yes  Make your own meals?: Yes    Do your own shopping?: Yes    Previous Hospitalizations:   Any hospitalizations or ED visits within the last 12 months?: No      Advance Care Planning:   Living will: No    Durable POA for healthcare: No    Advanced directive: No    Advanced directive counseling given: No    Five wishes given: Yes    End of Life Decisions reviewed with patient: Yes    Provider agrees with end of life decisions: Yes      Cognitive Screening:   Provider or family/friend/caregiver concerned regarding cognition?: No    PREVENTIVE SCREENINGS      Cardiovascular Screening:    General: Risks and Benefits Discussed    Due for: Lipid Panel      Diabetes Screening:     General: Risks and Benefits Discussed    Due for: Blood Glucose      Colorectal Cancer Screening:     General: Risks and Benefits Discussed    Due for: Cologuard      Breast Cancer Screening:     General: Risks and Benefits Discussed    Due for: Mammogram        Cervical Cancer Screening:    General: Screening Not Indicated      Osteoporosis Screening:    General: Screening Current      Abdominal Aortic Aneurysm (AAA) Screening:        General: Screening Not Indicated      Lung Cancer Screening:     General: Screening Not Indicated    Other Counseling Topics:   Alcohol use counseling, car/seat belt/driving safety, skin self-exam, sunscreen and regular weightbearing exercise and calcium and vitamin D intake         Florian Page, DO

## 2020-09-15 DIAGNOSIS — M15.9 PRIMARY OSTEOARTHRITIS INVOLVING MULTIPLE JOINTS: ICD-10-CM

## 2020-09-15 RX ORDER — CELECOXIB 100 MG/1
100 CAPSULE ORAL 2 TIMES DAILY
Qty: 180 CAPSULE | Refills: 1 | Status: SHIPPED | OUTPATIENT
Start: 2020-09-15 | End: 2021-03-22 | Stop reason: SDUPTHER

## 2020-09-28 ENCOUNTER — IMMUNIZATIONS (OUTPATIENT)
Dept: FAMILY MEDICINE CLINIC | Facility: CLINIC | Age: 70
End: 2020-09-28
Payer: MEDICARE

## 2020-09-28 ENCOUNTER — APPOINTMENT (OUTPATIENT)
Dept: LAB | Facility: CLINIC | Age: 70
End: 2020-09-28
Payer: MEDICARE

## 2020-09-28 DIAGNOSIS — Z12.11 SCREENING FOR COLON CANCER: Primary | ICD-10-CM

## 2020-09-28 DIAGNOSIS — Z79.899 HIGH RISK MEDICATION USE: ICD-10-CM

## 2020-09-28 DIAGNOSIS — Z11.59 ENCOUNTER FOR HEPATITIS C SCREENING TEST FOR LOW RISK PATIENT: ICD-10-CM

## 2020-09-28 DIAGNOSIS — Z13.6 SCREENING FOR CARDIOVASCULAR CONDITION: ICD-10-CM

## 2020-09-28 DIAGNOSIS — M10.9 GOUT, UNSPECIFIED CAUSE, UNSPECIFIED CHRONICITY, UNSPECIFIED SITE: ICD-10-CM

## 2020-09-28 DIAGNOSIS — Z23 IMMUNIZATION DUE: Primary | ICD-10-CM

## 2020-09-28 LAB
ALBUMIN SERPL BCP-MCNC: 4.2 G/DL (ref 3.5–5)
ALP SERPL-CCNC: 87 U/L (ref 46–116)
ALT SERPL W P-5'-P-CCNC: 43 U/L (ref 12–78)
ANION GAP SERPL CALCULATED.3IONS-SCNC: 3 MMOL/L (ref 4–13)
AST SERPL W P-5'-P-CCNC: 35 U/L (ref 5–45)
BACTERIA UR QL AUTO: ABNORMAL /HPF
BASOPHILS # BLD AUTO: 0.07 THOUSANDS/ΜL (ref 0–0.1)
BASOPHILS NFR BLD AUTO: 1 % (ref 0–1)
BILIRUB SERPL-MCNC: 0.65 MG/DL (ref 0.2–1)
BILIRUB UR QL STRIP: NEGATIVE
BUN SERPL-MCNC: 12 MG/DL (ref 5–25)
CALCIUM SERPL-MCNC: 9.7 MG/DL (ref 8.3–10.1)
CHLORIDE SERPL-SCNC: 112 MMOL/L (ref 100–108)
CHOLEST SERPL-MCNC: 189 MG/DL (ref 50–200)
CLARITY UR: CLEAR
CO2 SERPL-SCNC: 24 MMOL/L (ref 21–32)
COLOR UR: YELLOW
CREAT SERPL-MCNC: 1.03 MG/DL (ref 0.6–1.3)
EOSINOPHIL # BLD AUTO: 0.06 THOUSAND/ΜL (ref 0–0.61)
EOSINOPHIL NFR BLD AUTO: 1 % (ref 0–6)
ERYTHROCYTE [DISTWIDTH] IN BLOOD BY AUTOMATED COUNT: 12.9 % (ref 11.6–15.1)
GFR SERPL CREATININE-BSD FRML MDRD: 56 ML/MIN/1.73SQ M
GLUCOSE P FAST SERPL-MCNC: 88 MG/DL (ref 65–99)
GLUCOSE UR STRIP-MCNC: NEGATIVE MG/DL
HCT VFR BLD AUTO: 48.8 % (ref 34.8–46.1)
HDLC SERPL-MCNC: 42 MG/DL
HGB BLD-MCNC: 17.2 G/DL (ref 11.5–15.4)
HGB UR QL STRIP.AUTO: NEGATIVE
HYALINE CASTS #/AREA URNS LPF: ABNORMAL /LPF
IMM GRANULOCYTES # BLD AUTO: 0.02 THOUSAND/UL (ref 0–0.2)
IMM GRANULOCYTES NFR BLD AUTO: 0 % (ref 0–2)
KETONES UR STRIP-MCNC: NEGATIVE MG/DL
LDLC SERPL CALC-MCNC: 109 MG/DL (ref 0–100)
LEUKOCYTE ESTERASE UR QL STRIP: ABNORMAL
LYMPHOCYTES # BLD AUTO: 2.32 THOUSANDS/ΜL (ref 0.6–4.47)
LYMPHOCYTES NFR BLD AUTO: 31 % (ref 14–44)
MCH RBC QN AUTO: 34.1 PG (ref 26.8–34.3)
MCHC RBC AUTO-ENTMCNC: 35.2 G/DL (ref 31.4–37.4)
MCV RBC AUTO: 97 FL (ref 82–98)
MONOCYTES # BLD AUTO: 0.56 THOUSAND/ΜL (ref 0.17–1.22)
MONOCYTES NFR BLD AUTO: 7 % (ref 4–12)
NEUTROPHILS # BLD AUTO: 4.51 THOUSANDS/ΜL (ref 1.85–7.62)
NEUTS SEG NFR BLD AUTO: 60 % (ref 43–75)
NITRITE UR QL STRIP: NEGATIVE
NON-SQ EPI CELLS URNS QL MICRO: ABNORMAL /HPF
NONHDLC SERPL-MCNC: 147 MG/DL
NRBC BLD AUTO-RTO: 0 /100 WBCS
PH UR STRIP.AUTO: 6 [PH]
PLATELET # BLD AUTO: 190 THOUSANDS/UL (ref 149–390)
PMV BLD AUTO: 12.2 FL (ref 8.9–12.7)
POTASSIUM SERPL-SCNC: 4.2 MMOL/L (ref 3.5–5.3)
PROT SERPL-MCNC: 8.2 G/DL (ref 6.4–8.2)
PROT UR STRIP-MCNC: NEGATIVE MG/DL
RBC # BLD AUTO: 5.05 MILLION/UL (ref 3.81–5.12)
RBC #/AREA URNS AUTO: ABNORMAL /HPF
SODIUM SERPL-SCNC: 139 MMOL/L (ref 136–145)
SP GR UR STRIP.AUTO: 1.01 (ref 1–1.03)
TRIGL SERPL-MCNC: 191 MG/DL
URATE SERPL-MCNC: 5.9 MG/DL (ref 2–6.8)
UROBILINOGEN UR QL STRIP.AUTO: 0.2 E.U./DL
WBC # BLD AUTO: 7.54 THOUSAND/UL (ref 4.31–10.16)
WBC #/AREA URNS AUTO: ABNORMAL /HPF

## 2020-09-28 PROCEDURE — 81001 URINALYSIS AUTO W/SCOPE: CPT | Performed by: FAMILY MEDICINE

## 2020-09-28 PROCEDURE — 36415 COLL VENOUS BLD VENIPUNCTURE: CPT

## 2020-09-28 PROCEDURE — 84550 ASSAY OF BLOOD/URIC ACID: CPT

## 2020-09-28 PROCEDURE — 80061 LIPID PANEL: CPT

## 2020-09-28 PROCEDURE — 85025 COMPLETE CBC W/AUTO DIFF WBC: CPT

## 2020-09-28 PROCEDURE — 80053 COMPREHEN METABOLIC PANEL: CPT

## 2020-09-28 PROCEDURE — 90662 IIV NO PRSV INCREASED AG IM: CPT

## 2020-09-28 PROCEDURE — 86803 HEPATITIS C AB TEST: CPT

## 2020-09-28 PROCEDURE — G0008 ADMIN INFLUENZA VIRUS VAC: HCPCS

## 2020-09-28 RX ORDER — ALLOPURINOL 100 MG/1
100 TABLET ORAL DAILY
Qty: 90 TABLET | Refills: 1 | Status: SHIPPED | OUTPATIENT
Start: 2020-09-28 | End: 2021-07-06 | Stop reason: SDUPTHER

## 2020-09-29 LAB — HCV AB SER QL: NORMAL

## 2020-10-12 ENCOUNTER — OFFICE VISIT (OUTPATIENT)
Dept: FAMILY MEDICINE CLINIC | Facility: CLINIC | Age: 70
End: 2020-10-12
Payer: MEDICARE

## 2020-10-12 VITALS
RESPIRATION RATE: 16 BRPM | HEIGHT: 65 IN | DIASTOLIC BLOOD PRESSURE: 84 MMHG | TEMPERATURE: 97.3 F | HEART RATE: 109 BPM | OXYGEN SATURATION: 99 % | BODY MASS INDEX: 23.39 KG/M2 | WEIGHT: 140.4 LBS | SYSTOLIC BLOOD PRESSURE: 128 MMHG

## 2020-10-12 DIAGNOSIS — Z48.89 SUTURE CHECK: ICD-10-CM

## 2020-10-12 DIAGNOSIS — S09.90XS INJURY OF HEAD, SEQUELA: Primary | ICD-10-CM

## 2020-10-12 PROBLEM — S09.90XA HEAD INJURY: Status: ACTIVE | Noted: 2020-10-12

## 2020-10-12 PROCEDURE — 99214 OFFICE O/P EST MOD 30 MIN: CPT | Performed by: FAMILY MEDICINE

## 2020-10-12 RX ORDER — IRBESARTAN 150 MG/1
150 TABLET ORAL
COMMUNITY
End: 2021-11-08

## 2020-10-12 RX ORDER — CEPHALEXIN 500 MG/1
CAPSULE ORAL
COMMUNITY
Start: 2020-10-11 | End: 2021-11-08 | Stop reason: ALTCHOICE

## 2021-03-09 DIAGNOSIS — M15.9 PRIMARY OSTEOARTHRITIS INVOLVING MULTIPLE JOINTS: ICD-10-CM

## 2021-03-09 RX ORDER — CELECOXIB 100 MG/1
CAPSULE ORAL
Qty: 180 CAPSULE | Refills: 1 | OUTPATIENT
Start: 2021-03-09

## 2021-03-22 DIAGNOSIS — M15.9 PRIMARY OSTEOARTHRITIS INVOLVING MULTIPLE JOINTS: ICD-10-CM

## 2021-03-22 RX ORDER — CELECOXIB 100 MG/1
100 CAPSULE ORAL 2 TIMES DAILY
Qty: 180 CAPSULE | Refills: 1 | Status: SHIPPED | OUTPATIENT
Start: 2021-03-22 | End: 2021-09-24 | Stop reason: SDUPTHER

## 2021-07-06 DIAGNOSIS — M10.9 GOUT, UNSPECIFIED CAUSE, UNSPECIFIED CHRONICITY, UNSPECIFIED SITE: ICD-10-CM

## 2021-07-06 RX ORDER — ALLOPURINOL 100 MG/1
100 TABLET ORAL DAILY
Qty: 90 TABLET | Refills: 1 | Status: SHIPPED | OUTPATIENT
Start: 2021-07-06 | End: 2021-09-24 | Stop reason: SDUPTHER

## 2021-09-24 DIAGNOSIS — M15.9 PRIMARY OSTEOARTHRITIS INVOLVING MULTIPLE JOINTS: ICD-10-CM

## 2021-09-24 DIAGNOSIS — M10.9 GOUT, UNSPECIFIED CAUSE, UNSPECIFIED CHRONICITY, UNSPECIFIED SITE: ICD-10-CM

## 2021-09-24 RX ORDER — CELECOXIB 100 MG/1
100 CAPSULE ORAL 2 TIMES DAILY
Qty: 180 CAPSULE | Refills: 1 | Status: SHIPPED | OUTPATIENT
Start: 2021-09-24 | End: 2022-04-01 | Stop reason: SDUPTHER

## 2021-09-24 RX ORDER — ALLOPURINOL 100 MG/1
100 TABLET ORAL DAILY
Qty: 90 TABLET | Refills: 1 | Status: SHIPPED | OUTPATIENT
Start: 2021-09-24 | End: 2022-04-01 | Stop reason: SDUPTHER

## 2021-09-30 ENCOUNTER — IMMUNIZATIONS (OUTPATIENT)
Dept: FAMILY MEDICINE CLINIC | Facility: CLINIC | Age: 71
End: 2021-09-30
Payer: MEDICARE

## 2021-09-30 DIAGNOSIS — Z23 NEED FOR VACCINATION: Primary | ICD-10-CM

## 2021-09-30 PROCEDURE — 90662 IIV NO PRSV INCREASED AG IM: CPT

## 2021-09-30 PROCEDURE — G0008 ADMIN INFLUENZA VIRUS VAC: HCPCS

## 2021-11-08 ENCOUNTER — OFFICE VISIT (OUTPATIENT)
Dept: FAMILY MEDICINE CLINIC | Facility: CLINIC | Age: 71
End: 2021-11-08
Payer: MEDICARE

## 2021-11-08 VITALS
OXYGEN SATURATION: 98 % | BODY MASS INDEX: 23.79 KG/M2 | HEIGHT: 65 IN | RESPIRATION RATE: 18 BRPM | WEIGHT: 142.8 LBS | HEART RATE: 80 BPM | DIASTOLIC BLOOD PRESSURE: 78 MMHG | SYSTOLIC BLOOD PRESSURE: 124 MMHG | TEMPERATURE: 98.3 F

## 2021-11-08 DIAGNOSIS — Z79.899 HIGH RISK MEDICATION USE: ICD-10-CM

## 2021-11-08 DIAGNOSIS — Z12.11 SCREENING FOR MALIGNANT NEOPLASM OF COLON: ICD-10-CM

## 2021-11-08 DIAGNOSIS — M10.9 GOUT, UNSPECIFIED CAUSE, UNSPECIFIED CHRONICITY, UNSPECIFIED SITE: ICD-10-CM

## 2021-11-08 DIAGNOSIS — Z13.6 SCREENING FOR CARDIOVASCULAR CONDITION: ICD-10-CM

## 2021-11-08 DIAGNOSIS — Z12.31 ENCOUNTER FOR SCREENING MAMMOGRAM FOR BREAST CANCER: ICD-10-CM

## 2021-11-08 DIAGNOSIS — I10 PRIMARY HYPERTENSION: ICD-10-CM

## 2021-11-08 DIAGNOSIS — Z00.00 MEDICARE ANNUAL WELLNESS VISIT, SUBSEQUENT: Primary | ICD-10-CM

## 2021-11-08 DIAGNOSIS — R05.9 COUGH: ICD-10-CM

## 2021-11-08 PROCEDURE — 1123F ACP DISCUSS/DSCN MKR DOCD: CPT | Performed by: FAMILY MEDICINE

## 2021-11-08 PROCEDURE — G0439 PPPS, SUBSEQ VISIT: HCPCS | Performed by: FAMILY MEDICINE

## 2022-04-01 DIAGNOSIS — M10.9 GOUT, UNSPECIFIED CAUSE, UNSPECIFIED CHRONICITY, UNSPECIFIED SITE: ICD-10-CM

## 2022-04-01 DIAGNOSIS — M15.9 PRIMARY OSTEOARTHRITIS INVOLVING MULTIPLE JOINTS: ICD-10-CM

## 2022-04-01 RX ORDER — ALLOPURINOL 100 MG/1
100 TABLET ORAL DAILY
Qty: 90 TABLET | Refills: 1 | Status: SHIPPED | OUTPATIENT
Start: 2022-04-01

## 2022-04-01 RX ORDER — CELECOXIB 100 MG/1
100 CAPSULE ORAL 2 TIMES DAILY
Qty: 180 CAPSULE | Refills: 1 | Status: SHIPPED | OUTPATIENT
Start: 2022-04-01

## 2022-06-06 DIAGNOSIS — M25.571 CHRONIC PAIN OF RIGHT ANKLE: ICD-10-CM

## 2022-06-06 DIAGNOSIS — M79.671 RIGHT FOOT PAIN: Primary | ICD-10-CM

## 2022-06-06 DIAGNOSIS — Z72.0 TOBACCO USE: ICD-10-CM

## 2022-06-06 DIAGNOSIS — G89.29 CHRONIC PAIN OF RIGHT ANKLE: ICD-10-CM

## 2022-06-06 RX ORDER — VARENICLINE TARTRATE 0.5 MG/1
TABLET, FILM COATED ORAL
Qty: 11 TABLET | Refills: 0 | Status: SHIPPED | OUTPATIENT
Start: 2022-06-06

## 2022-06-06 RX ORDER — VARENICLINE TARTRATE 1 MG/1
1 TABLET, FILM COATED ORAL 2 TIMES DAILY
Qty: 60 TABLET | Refills: 3 | Status: SHIPPED | OUTPATIENT
Start: 2022-06-06

## 2022-06-09 ENCOUNTER — TELEPHONE (OUTPATIENT)
Dept: FAMILY MEDICINE CLINIC | Facility: CLINIC | Age: 72
End: 2022-06-09

## 2022-06-09 NOTE — TELEPHONE ENCOUNTER
Patient said the pharmacy told her she needs a pre Auth done for the chantix you sent over the other day

## 2022-06-10 ENCOUNTER — APPOINTMENT (OUTPATIENT)
Dept: RADIOLOGY | Facility: CLINIC | Age: 72
End: 2022-06-10
Payer: MEDICARE

## 2022-06-10 DIAGNOSIS — M79.671 RIGHT FOOT PAIN: ICD-10-CM

## 2022-06-10 DIAGNOSIS — M25.571 CHRONIC PAIN OF RIGHT ANKLE: ICD-10-CM

## 2022-06-10 DIAGNOSIS — G89.29 CHRONIC PAIN OF RIGHT ANKLE: ICD-10-CM

## 2022-06-10 PROCEDURE — 73610 X-RAY EXAM OF ANKLE: CPT

## 2022-06-10 PROCEDURE — 73630 X-RAY EXAM OF FOOT: CPT

## 2022-06-17 DIAGNOSIS — M19.079 ARTHRITIS OF ANKLE: Primary | ICD-10-CM

## 2022-06-30 ENCOUNTER — TELEPHONE (OUTPATIENT)
Dept: FAMILY MEDICINE CLINIC | Facility: CLINIC | Age: 72
End: 2022-06-30

## 2022-06-30 NOTE — TELEPHONE ENCOUNTER
Please always send to the doctor first  We also need a fax from the pharmacy for cover my meds in order to start prior auth before we can start it

## 2022-06-30 NOTE — TELEPHONE ENCOUNTER
I sent it to Dr Minor a week ago, he wasn't away yet  Do I send the pre Auth stuff to him or the the MA's?   Just so I know in the future

## 2022-06-30 NOTE — TELEPHONE ENCOUNTER
Patient called again today to see if the pre Auth was completed for her Chantex  Patient called last week saying the pharmacy called her and told her she needed a pre Auth from her Insurance for the Chantex  Are you able to look into this for me?

## 2022-07-08 ENCOUNTER — OFFICE VISIT (OUTPATIENT)
Dept: OBGYN CLINIC | Facility: CLINIC | Age: 72
End: 2022-07-08
Payer: MEDICARE

## 2022-07-08 ENCOUNTER — APPOINTMENT (OUTPATIENT)
Dept: RADIOLOGY | Facility: CLINIC | Age: 72
End: 2022-07-08
Payer: MEDICARE

## 2022-07-08 VITALS
HEIGHT: 65 IN | BODY MASS INDEX: 23.66 KG/M2 | WEIGHT: 142 LBS | SYSTOLIC BLOOD PRESSURE: 142 MMHG | DIASTOLIC BLOOD PRESSURE: 100 MMHG

## 2022-07-08 DIAGNOSIS — M19.079 ARTHRITIS OF ANKLE: ICD-10-CM

## 2022-07-08 DIAGNOSIS — M19.071 PRIMARY OSTEOARTHRITIS OF RIGHT ANKLE: Primary | ICD-10-CM

## 2022-07-08 DIAGNOSIS — Z72.0 TOBACCO USE: Primary | ICD-10-CM

## 2022-07-08 PROCEDURE — 99203 OFFICE O/P NEW LOW 30 MIN: CPT | Performed by: ORTHOPAEDIC SURGERY

## 2022-07-08 PROCEDURE — 20605 DRAIN/INJ JOINT/BURSA W/O US: CPT | Performed by: ORTHOPAEDIC SURGERY

## 2022-07-08 PROCEDURE — 73610 X-RAY EXAM OF ANKLE: CPT

## 2022-07-08 RX ORDER — TRIAMCINOLONE ACETONIDE 40 MG/ML
40 INJECTION, SUSPENSION INTRA-ARTICULAR; INTRAMUSCULAR
Status: COMPLETED | OUTPATIENT
Start: 2022-07-08 | End: 2022-07-08

## 2022-07-08 RX ORDER — VARENICLINE TARTRATE 1 MG/1
1 TABLET, FILM COATED ORAL 2 TIMES DAILY
Qty: 60 TABLET | Refills: 3 | Status: SHIPPED | OUTPATIENT
Start: 2022-07-08

## 2022-07-08 RX ORDER — LIDOCAINE HYDROCHLORIDE 10 MG/ML
4 INJECTION, SOLUTION INFILTRATION; PERINEURAL
Status: COMPLETED | OUTPATIENT
Start: 2022-07-08 | End: 2022-07-08

## 2022-07-08 RX ADMIN — TRIAMCINOLONE ACETONIDE 40 MG: 40 INJECTION, SUSPENSION INTRA-ARTICULAR; INTRAMUSCULAR at 11:40

## 2022-07-08 RX ADMIN — LIDOCAINE HYDROCHLORIDE 4 ML: 10 INJECTION, SOLUTION INFILTRATION; PERINEURAL at 11:40

## 2022-07-08 NOTE — PROGRESS NOTES
JAYSON Gsaca  Attending, Orthopaedic Surgery  [facility] [address] [facility] [address] [facility]        ORTHOPAEDIC FOOT AND ANKLE CLINIC VISIT     Assessment:     Encounter Diagnosis   Name Primary?  Primary osteoarthritis of right ankle Yes              Plan:   · The patient verbalized understanding of exam findings and treatment plan  We engaged in the shared decision-making process and treatment options were discussed at length with the patient  Surgical and conservative management discussed today along with risks and benefits  · Explained to the patient that she has end stage osteoarthritis of the right ankle  At this time, she is not a surgical candidate for a TAA due to smoking 2-3 PPD  She would need to be off nicotine products for 6 weeks before and 3 months after a surgical procedure  · She was provided with a cortisone injection today in the office  This is documented appropriately below  · We also provided the patient a prescription for an [place] brace to obtain at a local DME store  · OTC meds and ice prn for pain relief   Return in 3 months (on 10/8/2022) for Recheck of right ankle  History of Present Illness:   Chief Complaint:   Chief Complaint   Patient presents with    Right Ankle - Pain     Yary Robertson is a 71 y o  female who is being seen today for an orthopedic surgery consultation visit at the request of Dr Minor on 6/17/2022 for right ankle pain  Patient has known sevre osteoarthritis of the right ankle  Pain is localized at right ankle with minimal radiating and described as sharp and severe  Patient denies numbness, tingling or radicular pain  Denies history of neuropathy  Patient does smoke 2-3 PPD, is currently on Chantix  She does not have diabetes and does not take blood thinners  Patient denies family history of anesthesia complications and has not had any complications with anesthesia       Pain/symptom timing:  Worse during the day when active  Pain/symptom context:  Worse with activites and work  Pain/symptom modifying factors:  Rest makes better, activities make worse  Pain/symptom associated signs/symptoms: none    Prior treatment   · NSAIDsYes    · Injections No   · Bracing/Orthotics No   · Physical Therapy No     Orthopedic Surgical History:   See below    Past Medical, Surgical and Social History:  Past Medical History:  has no past medical history on file  Problem List: does not have any pertinent problems on file  Past Surgical History:  has a past surgical history that includes Replacement total knee  Family History: family history includes Aortic aneurysm in her family; Brain cancer in her family; Hypertension in her family and mother; Stroke in her family  Social History:  reports that she has been smoking cigarettes  She has a 100 00 pack-year smoking history  She has never used smokeless tobacco  She reports previous alcohol use  She reports that she does not use drugs  Current Medications: has a current medication list which includes the following prescription(s): allopurinol, celecoxib, daily multiple vitamins/iron, varenicline, and varenicline  Allergies: is allergic to codeine and medical tape       Review of Systems:  General- denies fever/chills  HEENT- denies hearing loss or sore throat  Eyes- denies eye pain or visual disturbances, denies red eyes  Respiratory- denies cough or SOB  Cardio- denies chest pain or palpitations  GI- denies abdominal pain  Endocrine- denies urinary frequency  Urinary- denies pain with urination  Musculoskeletal- Negative except noted above  Skin- denies rashes or wounds  Neurological- denies dizziness or headache  Psychiatric- denies anxiety or difficulty concentrating    Physical Exam:   /100 (BP Location: Left arm, Patient Position: Sitting, Cuff Size: Adult)   Ht 5' 5" (1 651 m)   Wt 64 4 kg (142 lb)   BMI 23 63 kg/m²   General/Constitutional: No apparent distress: well-nourished and well developed  Eyes: normal ocular motion  Cardio: RRR, Normal S1S2, No m/r/g  Lymphatic: No appreciable lymphadenopathy  Respiratory: Non-labored breathing, CTA b/l no w/c/r  Vascular: No edema, swelling or tenderness, except as noted in detailed exam   Integumentary: No impressive skin lesions present, except as noted in detailed exam   Neuro: No ataxia or tremors noted  Psych: Normal mood and affect, oriented to person, place and time  Appropriate affect  Musculoskeletal: Normal, except as noted in detailed exam and in HPI  Examination    Right    Gait Antalgic   Musculoskeletal Tender to palpation at anterior ankle    Skin Normal       Nails Normal    Range of Motion  15 degrees dorsiflexion, 20 degrees plantarflexion  Subtalar motion: normal    Stability Stable    Muscle Strength 5/5 tibialis anterior  5/5 gastrocnemius-soleus  5/5 posterior tibialis  5/5 peroneal/eversion strength  5/5 EHL  5/5 FHL    Neurologic Normal    Sensation Intact to light touch throughout sural, saphenous, superficial peroneal, deep peroneal and medial/lateral plantar nerve distributions  [place] 5 07 filament (10g) testing deferred  Cardiovascular Brisk capillary refill < 2 seconds,intact DP and PT pulses    Special Tests None      Imaging Studies:   6 views of the right ankle were taken, reviewed and interpreted independently that demonstrate end stage osteoarthritis  Reviewed by me personally  Cleon Ginsberg Lachman, MD  Foot & Ankle Surgery   Department [address]      I personally performed the service  Cleon Ginsberg Lachman, MD    Medium joint arthrocentesis: R ankle  Universal Protocol:  Consent: Verbal consent obtained  Risks and benefits: risks, benefits and alternatives were discussed  Consent given by: patient  Site marked: the operative site was marked  Radiology Images displayed and confirmed   If images not available, report reviewed: imaging studies available    Supporting Documentation  Indications: pain and diagnostic evaluation   Procedure Details  Location: ankle - R ankle  Preparation: Patient was prepped and draped in the usual sterile fashion  Needle size: 25 G  Ultrasound guidance: no  Approach: anterolateral  Medications administered: 40 mg triamcinolone acetonide 40 mg/mL; 4 mL lidocaine 1 %    Patient tolerance: patient tolerated the procedure well with no immediate complications  Dressing:  Sterile dressing applied            Scribe Attestation    I,:  Suyapa Medina am acting as a scribe while in the presence of the attending physician :       I,:  Cuong Burciaga MD personally performed the services described in this documentation    as scribed in my presence :

## 2022-07-08 NOTE — PATIENT INSTRUCTIONS
End stage ankle arthritis  NSAIDs  Bracing- [place] brace prescribed  Injection- performed today  Can be repeated as often as every 3 months if needed  Surgery- either and ankle replacement or ankle fusion  Both would require you to be off nicotine products for 6 weeks before surgery and 3 months after surgery  Today, We recommended a brace/prosthesis for you  [facility] certified pedorthotists make orthotics but not braces or prosthesis  Below are the companies in the area that make these, Please call ahead for an appointment and to ensure the company accepts your insurance  Make sure to bring the prescription given to you in the office today to the company for the brace/prosthesis  Brand  [employer]  [address] [place] [address] [place] [address] Phone: [phone]  [place] Fax: [phone]    [address]  [address]  [address]  (By Appointment Only)  Directions  [address] [facility], [address] [place]  [place] Phone: [phone]  [place] Fax: [address] [facility] Location  [address]   [address] [facility]  [phone] (fax)    [facility]  [address] [facility], [address] [facility],[address]  [facility], [address]  [phone] (fax)    [facility]  [address] [facility], [address]  [phone] (fax)    [facility] Location  [address] [facility], [address]  [address] [facility]  [phone] (fax)    [place] [address] , [address], [place] [address]  [phone] (fax)

## 2022-07-13 DIAGNOSIS — Z72.0 TOBACCO USE: Primary | ICD-10-CM

## 2022-07-13 RX ORDER — BUPROPION HYDROCHLORIDE 150 MG/1
150 TABLET ORAL EVERY MORNING
Qty: 30 TABLET | Refills: 5 | Status: SHIPPED | OUTPATIENT
Start: 2022-07-13

## 2022-09-23 DIAGNOSIS — M10.9 GOUT, UNSPECIFIED CAUSE, UNSPECIFIED CHRONICITY, UNSPECIFIED SITE: ICD-10-CM

## 2022-09-23 DIAGNOSIS — M15.9 PRIMARY OSTEOARTHRITIS INVOLVING MULTIPLE JOINTS: ICD-10-CM

## 2022-09-23 RX ORDER — CELECOXIB 100 MG/1
CAPSULE ORAL
Qty: 180 CAPSULE | Refills: 1 | Status: SHIPPED | OUTPATIENT
Start: 2022-09-23

## 2022-09-23 RX ORDER — ALLOPURINOL 100 MG/1
TABLET ORAL
Qty: 90 TABLET | Refills: 1 | Status: SHIPPED | OUTPATIENT
Start: 2022-09-23

## 2022-11-02 ENCOUNTER — RA CDI HCC (OUTPATIENT)
Dept: OTHER | Facility: HOSPITAL | Age: 72
End: 2022-11-02

## 2022-11-02 NOTE — PROGRESS NOTES
Melissa [address]  coding opportunities       Chart reviewed, no opportunity found: CHART REVIEWED, NO OPPORTUNITY FOUND        Patients Insurance     Medicare Insurance: Medicare

## 2022-11-04 ENCOUNTER — OFFICE VISIT (OUTPATIENT)
Dept: OBGYN CLINIC | Facility: CLINIC | Age: 72
End: 2022-11-04

## 2022-11-04 VITALS — BODY MASS INDEX: 23.66 KG/M2 | WEIGHT: 142 LBS | HEIGHT: 65 IN

## 2022-11-04 DIAGNOSIS — M19.071 PRIMARY OSTEOARTHRITIS OF RIGHT ANKLE: Primary | ICD-10-CM

## 2022-11-04 RX ORDER — TRIAMCINOLONE ACETONIDE 40 MG/ML
40 INJECTION, SUSPENSION INTRA-ARTICULAR; INTRAMUSCULAR
Status: COMPLETED | OUTPATIENT
Start: 2022-11-04 | End: 2022-11-04

## 2022-11-04 RX ADMIN — TRIAMCINOLONE ACETONIDE 40 MG: 40 INJECTION, SUSPENSION INTRA-ARTICULAR; INTRAMUSCULAR at 13:02

## 2022-11-04 NOTE — PATIENT INSTRUCTIONS
During your appointment today, we injected your right ankle joint with a pain medication and steroids  It is common to immediately feel pain relief after the injection  This is temporary as the pain medication in the injection can wear off in 4-8 hours  Often, the pain returns later in the day and this is normal   The steroid can take 24-48 hours to kick in  Common complaints;  1  Pain at the injection site (like a bee sting)- use ice for 20 minutes at a time, taking 40 minutes break between ice sessions, to alleviate this discomfort  2  Skin discoloration- this is a common side-effect of the steroid and can be a permanent skin color change  3  Blood sugar elevation- occasionally, steroid injections can effect blood sugar in diabetic patients  We recommend monitoring your blood sugar over the next 3 days if you are diabetic  If the injection works, even if only for 20 minutes, it means that we have isolated the pain generator (i e  The injection was diagnositic)  The hope is that the injection provides prolonged pain relief (i e  The injection was therapeutic)  If you get better but the pain returns, it is a good sign that a surgery to correct your problem is very likely to eliminate the pain permanently

## 2022-11-04 NOTE — PROGRESS NOTES
JAYSON Roberts  Attending, Orthopaedic Surgery  [facility] and [address] [facility] [address] [facility]      ORTHOPAEDIC FOOT AND ANKLE CLINIC VISIT     Assessment:     Encounter Diagnosis   Name Primary? • Primary osteoarthritis of right ankle Yes            Plan:   · The patient verbalized understanding of exam findings and treatment plan  We engaged in the shared decision-making process and treatment options were discussed at length with the patient  Surgical and conservative management discussed today along with risks and benefits  · Patient has end stage osteoarthritis of her right ankle  She continues not to be a surgical candidate for a TAA as she smokes 2-3 PPD  She tried alternative medications to help with nicotine dependence but has failed these treatments  She would need to be off nicotine products for 6 weeks before and 3 months after a surgical procedure  · She was provided a repeat cortisone injection today in the office  This is documented appropriately below  If this does not give her benefit as it did last time then we would order an X Ray guided injection  · She has also not obtained an [place] brace  Return if symptoms worsen or fail to improve  History of Present Illness:   Chief Complaint:   Chief Complaint   Patient presents with   • Right Ankle - Follow-up     Tanner Zeng is a 71 y o  female who is being seen in follow-up for Right end stage ankle arthritis  When we last saw she we recommended CS injection and [place] brace  Pain had improved from the cortisone injection  Residual pain is localized at anterior ankle with minimal radiating and described as sharp and severe        Pain/symptom timing:  Worse during the day when active  Pain/symptom context:  Worse with activites and work  Pain/symptom modifying factors:  Rest makes better, activities make worse  Pain/symptom associated signs/symptoms: none    Prior treatment   · NSAIDsYes   · Injections Yes · Bracing/Orthotics Yes    · Physical Therapy No     Orthopedic Surgical History:   See below    Past Medical, Surgical and Social History:  Past Medical History:  has no past medical history on file  Problem List: does not have any pertinent problems on file  Past Surgical History:  has a past surgical history that includes Replacement total knee  Family History: family history includes Aortic aneurysm in her family; Brain cancer in her family; Hypertension in her family and mother; Stroke in her family  Social History:  reports that she has been smoking cigarettes  She has a 100 00 pack-year smoking history  She has never used smokeless tobacco  She reports previous alcohol use  She reports that she does not use drugs  Current Medications: has a current medication list which includes the following prescription(s): allopurinol, bupropion, celecoxib, daily multiple vitamins/iron, varenicline, varenicline, and varenicline  Allergies: is allergic to codeine and medical tape  Review of Systems:  General- denies fever/chills  HEENT- denies hearing loss or sore throat  Eyes- denies eye pain or visual disturbances, denies red eyes  Respiratory- denies cough or SOB  Cardio- denies chest pain or palpitations  GI- denies abdominal pain  Endocrine- denies urinary frequency  Urinary- denies pain with urination  Musculoskeletal- Negative except noted above  Skin- denies rashes or wounds  Neurological- denies dizziness or headache  Psychiatric- denies anxiety or difficulty concentrating    Physical Exam:   Ht 5' 5" (1 651 m)   Wt 64 4 kg (142 lb)   BMI 23 63 kg/m²   General/Constitutional: No apparent distress: well-nourished and well developed    Eyes: normal ocular motion  Lymphatic: No appreciable lymphadenopathy  Respiratory: Non-labored breathing  Vascular: No edema, swelling or tenderness, except as noted in detailed exam   Integumentary: No impressive skin lesions present, except as noted in detailed exam   Neuro: No ataxia or tremors noted  Psych: Normal mood and affect, oriented to person, place and time  Appropriate affect  Musculoskeletal: Normal, except as noted in detailed exam and in HPI  Examination    Right    Gait Antalgic   Musculoskeletal Tender to palpation at anterior ankle    Skin Normal     Nails Normal    Range of Motion  15 degrees dorsiflexion, 20 degrees plantarflexion  Subtalar motion: normal    Stability Stable    Muscle Strength 5/5 tibialis anterior  5/5 gastrocnemius-soleus  5/5 posterior tibialis  5/5 peroneal/eversion strength  5/5 EHL  5/5 FHL    Neurologic Normal    Sensation Intact to light touch throughout sural, saphenous, superficial peroneal, deep peroneal and medial/lateral plantar nerve distributions  [place] 5 07 filament (10g) testing deferred  Cardiovascular Brisk capillary refill < 2 seconds,intact DP and PT pulses    Special Tests None      Imaging Studies:   No new imaging    Medium joint arthrocentesis: R ankle  Universal Protocol:  Consent: Verbal consent obtained  Risks and benefits: risks, benefits and alternatives were discussed  Consent given by: patient  Site marked: the operative site was marked  Radiology Images displayed and confirmed  If images not available, report reviewed: imaging studies available  Patient identity confirmed: verbally with patient    Supporting Documentation  Indications: pain and diagnostic evaluation   Procedure Details  Location: ankle - R ankle  Preparation: Patient was prepped and draped in the usual sterile fashion  Needle size: 22 G  Ultrasound guidance: no  Approach: anterolateral  Medications administered: 40 mg triamcinolone acetonide 40 mg/mL    Patient tolerance: patient tolerated the procedure well with no immediate complications  Dressing:  Sterile dressing applied            Eustacio Buckler Lachman, MD  Foot & Ankle Surgery   Department of [address] [place] [address] personally performed the service  Gareth Byars Lachman, MD    Scribe Attestation    I,:  Galo Kam am acting as a scribe while in the presence of the attending physician :       I,:  Danny Centeno MD personally performed the services described in this documentation    as scribed in my presence :

## 2022-11-10 ENCOUNTER — OFFICE VISIT (OUTPATIENT)
Dept: FAMILY MEDICINE CLINIC | Facility: CLINIC | Age: 72
End: 2022-11-10

## 2022-11-10 VITALS
SYSTOLIC BLOOD PRESSURE: 132 MMHG | OXYGEN SATURATION: 98 % | HEART RATE: 95 BPM | RESPIRATION RATE: 14 BRPM | DIASTOLIC BLOOD PRESSURE: 82 MMHG | BODY MASS INDEX: 22.89 KG/M2 | WEIGHT: 137.4 LBS | TEMPERATURE: 98 F | HEIGHT: 65 IN

## 2022-11-10 DIAGNOSIS — Z13.6 SCREENING FOR CARDIOVASCULAR CONDITION: ICD-10-CM

## 2022-11-10 DIAGNOSIS — M10.9 GOUT, UNSPECIFIED CAUSE, UNSPECIFIED CHRONICITY, UNSPECIFIED SITE: ICD-10-CM

## 2022-11-10 DIAGNOSIS — Z72.0 TOBACCO USE: ICD-10-CM

## 2022-11-10 DIAGNOSIS — Z79.899 HIGH RISK MEDICATION USE: ICD-10-CM

## 2022-11-10 DIAGNOSIS — M21.612 BUNION OF GREAT TOE OF LEFT FOOT: ICD-10-CM

## 2022-11-10 DIAGNOSIS — Z00.00 MEDICARE ANNUAL WELLNESS VISIT, SUBSEQUENT: Primary | ICD-10-CM

## 2022-11-10 DIAGNOSIS — R05.3 CHRONIC COUGH: ICD-10-CM

## 2022-11-10 DIAGNOSIS — Z23 NEED FOR INFLUENZA VACCINATION: ICD-10-CM

## 2022-11-10 NOTE — PROGRESS NOTES
Assessment and Plan:     Problem List Items Addressed This Visit        Other    Gout    Relevant Orders    Uric acid      Other Visit Diagnoses     Medicare annual wellness visit, subsequent    -  Primary    Bunion of great toe of left foot        Need for influenza vaccination        Relevant Orders    influenza vaccine, high-dose, PF 0 7 mL (FLUZONE HIGH-DOSE) (Completed)    Tobacco use        Relevant Orders    XR chest pa & lateral    Chronic cough        Relevant Orders    XR chest pa & lateral    Screening for cardiovascular condition        Relevant Orders    CBC and differential    Comprehensive metabolic panel    Lipid panel    UA (URINE) with reflex to Scope    High risk medication use        Relevant Orders    CBC and differential    Comprehensive metabolic panel    UA (URINE) with reflex to Scope        Patient will follow-up with orthopedics  No treatment needed for bunion of toe at this time   Flu shot given  Chest x-ray ordered  Can follow up in 6-12 months or sooner if needed  Blood work ordered as well      Depression Screening and Follow-up Plan: Patient was screened for depression during today's encounter  They screened negative with a PHQ-2 score of 0  Preventive health issues were discussed with patient, and age appropriate screening tests were ordered as noted in patient's After Visit Summary  Personalized health advice and appropriate referrals for health education or preventive services given if needed, as noted in patient's After Visit Summary  History of Present Illness:     Patient presents for a Medicare Wellness Visit    Here for medicare wellness visit  Patient is still having chronic right ankle issues   She she also has a large bunion of her left foot     Patient Care Team:  Chris Castro DO as PCP - General     Review of Systems:     Review of Systems   Constitutional: Negative  HENT: Negative  Eyes: Negative  Respiratory: Negative  Cardiovascular: Negative  Gastrointestinal: Negative  Endocrine: Negative  Genitourinary: Negative  Musculoskeletal: Positive for arthralgias and gait problem  Skin: Negative  Allergic/Immunologic: Negative  Hematological: Negative  Psychiatric/Behavioral: Negative  All other systems reviewed and are negative  Problem List:     Patient Active Problem List   Diagnosis   • Abnormal mammogram   • Acute arthropathy   • Back pain, chronic   • Benign essential hypertension   • Candidal intertrigo   • Chronic sinusitis   • Dyshidrotic eczema   • Generalized osteoarthritis   • Gout   • Hip pain   • HTN (hypertension)   • Head injury   • Suture check      Past Medical and Surgical History:     No past medical history on file  Past Surgical History:   Procedure Laterality Date   • REPLACEMENT TOTAL KNEE        Family History:     Family History   Problem Relation Age of Onset   • Hypertension Mother    • Aortic aneurysm Family         abdominal   • Brain cancer Family    • Hypertension Family    • Stroke Family         snydrome      Social History:     Social History     Socioeconomic History   • Marital status: /Civil Union     Spouse name: None   • Number of children: None   • Years of education: None   • Highest education level: None   Occupational History   • None   Tobacco Use   • Smoking status: Current Every Day Smoker     Packs/day: 2 00     Years: 50 00     Pack years: 100 00     Types: Cigarettes   • Smokeless tobacco: Never Used   Vaping Use   • Vaping Use: Never used   Substance and Sexual Activity   • Alcohol use: Not Currently   • Drug use: Never   • Sexual activity: None   Other Topics Concern   • None   Social History Narrative   • None     Social Determinants of Health     Financial Resource Strain: Low Risk    • Difficulty of Paying Living Expenses: Not very hard   Food Insecurity: Not on file   Transportation Needs: No Transportation Needs   • Lack of Transportation (Medical):  No   • Lack of Transportation (Non-Medical): No   Physical Activity: Not on file   Stress: Not on file   Social Connections: Not on file   Intimate Partner Violence: Not on file   Housing Stability: Not on file      Medications and Allergies:     Current Outpatient Medications   Medication Sig Dispense Refill   • buPROPion (Wellbutrin XL) 150 mg 24 hr tablet Take 1 tablet (150 mg total) by mouth every morning 30 tablet 5   • celecoxib (CeleBREX) 100 mg capsule TAKE 1 CAPSULE BY MOUTH TWICE DAILY 180 capsule 1   • DAILY MULTIPLE VITAMINS/IRON TABS Take 1 tablet by mouth daily     • allopurinol (ZYLOPRIM) 100 mg tablet TAKE 1 TABLET BY MOUTH DAILY (Patient not taking: Reported on 11/10/2022) 90 tablet 1   • varenicline (CHANTIX) 0 5 mg tablet Take 1 tab daily for 3 days, one tab BID for 4 days (Patient not taking: No sig reported) 11 tablet 0   • varenicline (CHANTIX) 1 mg tablet Take 1 tablet (1 mg total) by mouth 2 (two) times a day (Patient not taking: No sig reported) 60 tablet 3   • varenicline (CHANTIX) 1 mg tablet Take 1 tablet (1 mg total) by mouth 2 (two) times a day (Patient not taking: No sig reported) 60 tablet 3     No current facility-administered medications for this visit       Allergies   Allergen Reactions   • Codeine GI Intolerance   • Medical Tape Other (See Comments)     "redness"      Immunizations:     Immunization History   Administered Date(s) Administered   • COVID-19 Moderna Vac BIVALENT 18 Yr+ Im (BOOSTER ONLY) 11/02/2022   • Influenza Split High Dose Preservative Free IM 10/25/2016, 10/20/2017   • Influenza, high dose seasonal 0 7 mL 10/17/2018, 10/23/2019, 09/28/2020, 09/30/2021, 11/10/2022   • Influenza, seasonal, injectable 10/01/2013   • Pneumococcal Conjugate 13-Valent 02/15/2017   • Pneumococcal Polysaccharide PPV23 10/17/2018   • Tdap 10/10/2020      Health Maintenance:         Topic Date Due   • Lung Cancer Screening  11/10/2023 (Originally 3/22/2001)   • Colorectal Cancer Screening  11/10/2023 (Originally 3/22/1996)   • Breast Cancer Screening: Mammogram  11/10/2023 (Originally 6/30/2018)   • Hepatitis C Screening  Completed         Topic Date Due   • COVID-19 Vaccine (1) Never done      Medicare Screening Tests and Risk Assessments:     Nicolette Rivera is here for her Subsequent Wellness visit  Last Medicare Wellness visit information reviewed, patient interviewed and updates made to the record today  Health Risk Assessment:   Patient rates overall health as good  Patient feels that their physical health rating is same  Patient is satisfied with their life  Eyesight was rated as same  Hearing was rated as same  Patient feels that their emotional and mental health rating is same  Patients states they are never, rarely angry  Patient states they are never, rarely unusually tired/fatigued  Pain experienced in the last 7 days has been none  Patient states that she has experienced no weight loss or gain in last 6 months  Depression Screening:   PHQ-2 Score: 0      Fall Risk Screening: In the past year, patient has experienced: no history of falling in past year      Urinary Incontinence Screening:   Patient has not leaked urine accidently in the last six months  Home Safety:  Patient does not have trouble with stairs inside or outside of their home  Patient has working smoke alarms and has working carbon monoxide detector  Home safety hazards include: none  Nutrition:   Current diet is Regular  Medications:   Patient is currently taking over-the-counter supplements  OTC medications include: see medication list  Patient is able to manage medications  Activities of Daily Living (ADLs)/Instrumental Activities of Daily Living (IADLs):   Walk and transfer into and out of bed and chair?: Yes  Dress and groom yourself?: Yes    Bathe or shower yourself?: Yes    Feed yourself?  Yes  Do your laundry/housekeeping?: Yes  Manage your money, pay your bills and track your expenses?: Yes  Make your own meals?: Yes    Do your own shopping?: Yes    Previous Hospitalizations:   Any hospitalizations or ED visits within the last 12 months?: No      Advance Care Planning:   Living will: No    Durable POA for healthcare: No    Advanced directive: No    Advanced directive counseling given: Yes    Five wishes given: No    End of Life Decisions reviewed with patient: Yes    Provider agrees with end of life decisions: Yes      Cognitive Screening:   Provider or family/friend/caregiver concerned regarding cognition?: No    PREVENTIVE SCREENINGS      Cardiovascular Screening:    General: Screening Current      Diabetes Screening:     General: Screening Current      Colorectal Cancer Screening:     General: Patient Declines      Breast Cancer Screening:     General: Patient Declines      Cervical Cancer Screening:    General: Patient Declines      Osteoporosis Screening:    General: Patient Declines      Abdominal Aortic Aneurysm (AAA) Screening:        General: Screening Current      Lung Cancer Screening:     General: Patient Declines      Hepatitis C Screening:    General: Screening Current    Screening, Brief Intervention, and Referral to Treatment (SBIRT)    Screening    Typical number of drinks in a week: 0    Single Item Drug Screening:  How often have you used an illegal drug (including marijuana) or a prescription medication for non-medical reasons in the past year? never    Single Item Drug Screen Score: 0  Interpretation: Negative screen for possible drug use disorder    Brief Intervention  Alcohol & drug use screenings were reviewed  No concerns regarding substance use disorder identified  Other Counseling Topics:   Car/seat belt/driving safety, skin self-exam, sunscreen and calcium and vitamin D intake and regular weightbearing exercise        Visual Acuity Screening    Right eye Left eye Both eyes   Without correction:      With correction:   20/25        Physical Exam:     /82 (BP Location: Left arm, Patient Position: Sitting, Cuff Size: Standard)   Pulse 95   Temp 98 °F (36 7 °C)   Resp 14   Ht 5' 5" (1 651 m)   Wt 62 3 kg (137 lb 6 4 oz)   SpO2 98%   BMI 22 86 kg/m²     Physical Exam  Constitutional:       Appearance: She is well-developed  HENT:      Head: Normocephalic  Right Ear: External ear normal       Left Ear: External ear normal       Nose: Nose normal    Eyes:      Conjunctiva/sclera: Conjunctivae normal       Pupils: Pupils are equal, round, and reactive to light  Cardiovascular:      Rate and Rhythm: Normal rate and regular rhythm  Heart sounds: Normal heart sounds  Pulmonary:      Effort: Pulmonary effort is normal       Breath sounds: Normal breath sounds  Abdominal:      General: Bowel sounds are normal       Palpations: Abdomen is soft  Musculoskeletal:         General: Normal range of motion  Cervical back: Normal range of motion and neck supple  Skin:     General: Skin is warm and dry  Neurological:      Mental Status: She is alert and oriented to person, place, and time  Psychiatric:         Behavior: Behavior normal          Thought Content:  Thought content normal          Judgment: Judgment normal           Florian Page, DO

## 2023-01-05 DIAGNOSIS — M15.9 PRIMARY OSTEOARTHRITIS INVOLVING MULTIPLE JOINTS: ICD-10-CM

## 2023-01-05 RX ORDER — CELECOXIB 100 MG/1
100 CAPSULE ORAL 2 TIMES DAILY
Qty: 180 CAPSULE | Refills: 1 | Status: SHIPPED | OUTPATIENT
Start: 2023-01-05

## 2023-04-06 DIAGNOSIS — M15.9 PRIMARY OSTEOARTHRITIS INVOLVING MULTIPLE JOINTS: ICD-10-CM

## 2023-04-06 RX ORDER — CELECOXIB 100 MG/1
100 CAPSULE ORAL 2 TIMES DAILY
Qty: 180 CAPSULE | Refills: 1 | Status: SHIPPED | OUTPATIENT
Start: 2023-04-06

## 2023-06-28 DIAGNOSIS — M15.9 PRIMARY OSTEOARTHRITIS INVOLVING MULTIPLE JOINTS: ICD-10-CM

## 2023-06-28 RX ORDER — CELECOXIB 100 MG/1
100 CAPSULE ORAL 2 TIMES DAILY
Qty: 180 CAPSULE | Refills: 1 | Status: SHIPPED | OUTPATIENT
Start: 2023-06-28 | End: 2023-07-03 | Stop reason: SDUPTHER

## 2023-07-03 DIAGNOSIS — M15.9 PRIMARY OSTEOARTHRITIS INVOLVING MULTIPLE JOINTS: ICD-10-CM

## 2023-07-03 RX ORDER — CELECOXIB 100 MG/1
100 CAPSULE ORAL 2 TIMES DAILY
Qty: 180 CAPSULE | Refills: 1 | Status: SHIPPED | OUTPATIENT
Start: 2023-07-03

## 2023-07-03 NOTE — TELEPHONE ENCOUNTER
Patient called and states celebrex was sent to [facility] which is the wrong pharmacy. Can you cancel that order and resend celebrex to [facility] in [place]?     Thank you

## 2023-08-17 ENCOUNTER — LAB (OUTPATIENT)
Dept: LAB | Facility: CLINIC | Age: 73
End: 2023-08-17
Payer: MEDICARE

## 2023-08-17 DIAGNOSIS — Z13.6 SCREENING FOR CARDIOVASCULAR CONDITION: ICD-10-CM

## 2023-08-17 DIAGNOSIS — Z79.899 HIGH RISK MEDICATION USE: ICD-10-CM

## 2023-08-17 DIAGNOSIS — M10.9 GOUT, UNSPECIFIED CAUSE, UNSPECIFIED CHRONICITY, UNSPECIFIED SITE: ICD-10-CM

## 2023-08-17 LAB
BASOPHILS # BLD AUTO: 0.06 THOUSANDS/ÂΜL (ref 0–0.1)
BASOPHILS NFR BLD AUTO: 1 % (ref 0–1)
EOSINOPHIL # BLD AUTO: 0.11 THOUSAND/ÂΜL (ref 0–0.61)
EOSINOPHIL NFR BLD AUTO: 2 % (ref 0–6)
ERYTHROCYTE [DISTWIDTH] IN BLOOD BY AUTOMATED COUNT: 12.6 % (ref 11.6–15.1)
HCT VFR BLD AUTO: 52.2 % (ref 34.8–46.1)
HGB BLD-MCNC: 18 G/DL (ref 11.5–15.4)
IMM GRANULOCYTES # BLD AUTO: 0.03 THOUSAND/UL (ref 0–0.2)
IMM GRANULOCYTES NFR BLD AUTO: 0 % (ref 0–2)
LYMPHOCYTES # BLD AUTO: 2.96 THOUSANDS/ÂΜL (ref 0.6–4.47)
LYMPHOCYTES NFR BLD AUTO: 43 % (ref 14–44)
MCH RBC QN AUTO: 33.1 PG (ref 26.8–34.3)
MCHC RBC AUTO-ENTMCNC: 34.5 G/DL (ref 31.4–37.4)
MCV RBC AUTO: 96 FL (ref 82–98)
MONOCYTES # BLD AUTO: 0.62 THOUSAND/ÂΜL (ref 0.17–1.22)
MONOCYTES NFR BLD AUTO: 9 % (ref 4–12)
NEUTROPHILS # BLD AUTO: 3.07 THOUSANDS/ÂΜL (ref 1.85–7.62)
NEUTS SEG NFR BLD AUTO: 45 % (ref 43–75)
NRBC BLD AUTO-RTO: 0 /100 WBCS
PLATELET # BLD AUTO: 200 THOUSANDS/UL (ref 149–390)
PMV BLD AUTO: 12.5 FL (ref 8.9–12.7)
RBC # BLD AUTO: 5.43 MILLION/UL (ref 3.81–5.12)
WBC # BLD AUTO: 6.85 THOUSAND/UL (ref 4.31–10.16)

## 2023-08-17 PROCEDURE — 36415 COLL VENOUS BLD VENIPUNCTURE: CPT

## 2023-08-17 PROCEDURE — 85025 COMPLETE CBC W/AUTO DIFF WBC: CPT

## 2023-08-17 PROCEDURE — 84550 ASSAY OF BLOOD/URIC ACID: CPT

## 2023-08-17 PROCEDURE — 80053 COMPREHEN METABOLIC PANEL: CPT

## 2023-08-17 PROCEDURE — 80061 LIPID PANEL: CPT

## 2023-08-18 LAB
ALBUMIN SERPL BCP-MCNC: 3.8 G/DL (ref 3.5–5)
ALP SERPL-CCNC: 78 U/L (ref 46–116)
ALT SERPL W P-5'-P-CCNC: 47 U/L (ref 12–78)
ANION GAP SERPL CALCULATED.3IONS-SCNC: 5 MMOL/L
AST SERPL W P-5'-P-CCNC: 40 U/L (ref 5–45)
BILIRUB SERPL-MCNC: 0.8 MG/DL (ref 0.2–1)
BUN SERPL-MCNC: 17 MG/DL (ref 5–25)
CALCIUM SERPL-MCNC: 10.1 MG/DL (ref 8.3–10.1)
CHLORIDE SERPL-SCNC: 108 MMOL/L (ref 96–108)
CHOLEST SERPL-MCNC: 181 MG/DL
CO2 SERPL-SCNC: 26 MMOL/L (ref 21–32)
CREAT SERPL-MCNC: 1.23 MG/DL (ref 0.6–1.3)
GFR SERPL CREATININE-BSD FRML MDRD: 43 ML/MIN/1.73SQ M
GLUCOSE P FAST SERPL-MCNC: 98 MG/DL (ref 65–99)
HDLC SERPL-MCNC: 45 MG/DL
LDLC SERPL CALC-MCNC: 101 MG/DL (ref 0–100)
NONHDLC SERPL-MCNC: 136 MG/DL
POTASSIUM SERPL-SCNC: 4.3 MMOL/L (ref 3.5–5.3)
PROT SERPL-MCNC: 8.1 G/DL (ref 6.4–8.4)
SODIUM SERPL-SCNC: 139 MMOL/L (ref 135–147)
TRIGL SERPL-MCNC: 177 MG/DL
URATE SERPL-MCNC: 8.7 MG/DL (ref 2–7.5)

## 2023-10-05 DIAGNOSIS — M10.9 GOUT, UNSPECIFIED CAUSE, UNSPECIFIED CHRONICITY, UNSPECIFIED SITE: ICD-10-CM

## 2023-10-05 DIAGNOSIS — M15.9 PRIMARY OSTEOARTHRITIS INVOLVING MULTIPLE JOINTS: ICD-10-CM

## 2023-10-05 RX ORDER — ALLOPURINOL 100 MG/1
100 TABLET ORAL DAILY
Qty: 90 TABLET | Refills: 1 | Status: SHIPPED | OUTPATIENT
Start: 2023-10-05

## 2023-10-05 RX ORDER — CELECOXIB 100 MG/1
100 CAPSULE ORAL 2 TIMES DAILY
Qty: 180 CAPSULE | Refills: 1 | Status: SHIPPED | OUTPATIENT
Start: 2023-10-05

## 2023-11-09 ENCOUNTER — APPOINTMENT (INPATIENT)
Dept: RADIOLOGY | Facility: HOSPITAL | Age: 73
DRG: 467 | End: 2023-11-09
Payer: MEDICARE

## 2023-11-09 ENCOUNTER — APPOINTMENT (EMERGENCY)
Dept: RADIOLOGY | Facility: HOSPITAL | Age: 73
DRG: 467 | End: 2023-11-09
Payer: MEDICARE

## 2023-11-09 ENCOUNTER — HOSPITAL ENCOUNTER (INPATIENT)
Facility: HOSPITAL | Age: 73
LOS: 4 days | Discharge: HOME HEALTH | DRG: 467 | End: 2023-11-13
Attending: EMERGENCY MEDICINE | Admitting: HOSPITALIST
Payer: MEDICARE

## 2023-11-09 DIAGNOSIS — I10 HYPERTENSION: ICD-10-CM

## 2023-11-09 DIAGNOSIS — Z96.643 HISTORY OF BILATERAL HIP REPLACEMENTS: ICD-10-CM

## 2023-11-09 DIAGNOSIS — Z96.641 HISTORY OF REVISION OF TOTAL REPLACEMENT OF RIGHT HIP JOINT: ICD-10-CM

## 2023-11-09 DIAGNOSIS — S72.001A CLOSED FRACTURE OF PROXIMAL END OF RIGHT FEMUR, INITIAL ENCOUNTER (HCC): Primary | ICD-10-CM
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
## 2023-11-09 LAB
ABO GROUP BLD: NORMAL
ABO GROUP BLD: NORMAL
ANION GAP SERPL CALCULATED.3IONS-SCNC: 9 MMOL/L
APTT PPP: 29 SECONDS (ref 23–37)
BASOPHILS # BLD AUTO: 0.07 THOUSANDS/ÂΜL (ref 0–0.1)
BASOPHILS NFR BLD AUTO: 1 % (ref 0–1)
BLD GP AB SCN SERPL QL: NEGATIVE
BUN SERPL-MCNC: 13 MG/DL (ref 5–25)
CALCIUM SERPL-MCNC: 9.5 MG/DL (ref 8.4–10.2)
CHLORIDE SERPL-SCNC: 104 MMOL/L (ref 96–108)
CO2 SERPL-SCNC: 24 MMOL/L (ref 21–32)
CREAT SERPL-MCNC: 1.04 MG/DL (ref 0.6–1.3)
EOSINOPHIL # BLD AUTO: 0.02 THOUSAND/ÂΜL (ref 0–0.61)
EOSINOPHIL NFR BLD AUTO: 0 % (ref 0–6)
ERYTHROCYTE [DISTWIDTH] IN BLOOD BY AUTOMATED COUNT: 12.9 % (ref 11.6–15.1)
GFR SERPL CREATININE-BSD FRML MDRD: 53 ML/MIN/1.73SQ M
GLUCOSE SERPL-MCNC: 116 MG/DL (ref 65–140)
HCT VFR BLD AUTO: 44.8 % (ref 34.8–46.1)
HGB BLD-MCNC: 15.8 G/DL (ref 11.5–15.4)
IMM GRANULOCYTES # BLD AUTO: 0.06 THOUSAND/UL (ref 0–0.2)
IMM GRANULOCYTES NFR BLD AUTO: 0 % (ref 0–2)
INR PPP: 1.07 (ref 0.84–1.19)
LYMPHOCYTES # BLD AUTO: 2.69 THOUSANDS/ÂΜL (ref 0.6–4.47)
LYMPHOCYTES NFR BLD AUTO: 20 % (ref 14–44)
MCH RBC QN AUTO: 33.5 PG (ref 26.8–34.3)
MCHC RBC AUTO-ENTMCNC: 35.3 G/DL (ref 31.4–37.4)
MCV RBC AUTO: 95 FL (ref 82–98)
MONOCYTES # BLD AUTO: 1.01 THOUSAND/ÂΜL (ref 0.17–1.22)
MONOCYTES NFR BLD AUTO: 7 % (ref 4–12)
NEUTROPHILS # BLD AUTO: 9.8 THOUSANDS/ÂΜL (ref 1.85–7.62)
NEUTS SEG NFR BLD AUTO: 72 % (ref 43–75)
NRBC BLD AUTO-RTO: 0 /100 WBCS
PLATELET # BLD AUTO: 165 THOUSANDS/UL (ref 149–390)
PMV BLD AUTO: 11.6 FL (ref 8.9–12.7)
POTASSIUM SERPL-SCNC: 3.5 MMOL/L (ref 3.5–5.3)
PROTHROMBIN TIME: 14.3 SECONDS (ref 11.6–14.5)
RBC # BLD AUTO: 4.72 MILLION/UL (ref 3.81–5.12)
RH BLD: POSITIVE
RH BLD: POSITIVE
SODIUM SERPL-SCNC: 137 MMOL/L (ref 135–147)
SPECIMEN EXPIRATION DATE: NORMAL
WBC # BLD AUTO: 13.65 THOUSAND/UL (ref 4.31–10.16)

## 2023-11-09 PROCEDURE — 73502 X-RAY EXAM HIP UNI 2-3 VIEWS: CPT

## 2023-11-09 PROCEDURE — 85610 PROTHROMBIN TIME: CPT | Performed by: PHYSICIAN ASSISTANT

## 2023-11-09 PROCEDURE — 86850 RBC ANTIBODY SCREEN: CPT | Performed by: PHYSICIAN ASSISTANT

## 2023-11-09 PROCEDURE — 86900 BLOOD TYPING SEROLOGIC ABO: CPT | Performed by: PHYSICIAN ASSISTANT

## 2023-11-09 PROCEDURE — 86901 BLOOD TYPING SEROLOGIC RH(D): CPT | Performed by: PHYSICIAN ASSISTANT

## 2023-11-09 PROCEDURE — 85025 COMPLETE CBC W/AUTO DIFF WBC: CPT | Performed by: PHYSICIAN ASSISTANT

## 2023-11-09 PROCEDURE — 36415 COLL VENOUS BLD VENIPUNCTURE: CPT | Performed by: PHYSICIAN ASSISTANT

## 2023-11-09 PROCEDURE — 71045 X-RAY EXAM CHEST 1 VIEW: CPT

## 2023-11-09 PROCEDURE — 80048 BASIC METABOLIC PNL TOTAL CA: CPT | Performed by: PHYSICIAN ASSISTANT

## 2023-11-09 PROCEDURE — 99284 EMERGENCY DEPT VISIT MOD MDM: CPT

## 2023-11-09 PROCEDURE — 99222 1ST HOSP IP/OBS MODERATE 55: CPT | Performed by: PHYSICIAN ASSISTANT

## 2023-11-09 PROCEDURE — 85730 THROMBOPLASTIN TIME PARTIAL: CPT | Performed by: PHYSICIAN ASSISTANT

## 2023-11-09 PROCEDURE — 99285 EMERGENCY DEPT VISIT HI MDM: CPT | Performed by: EMERGENCY MEDICINE

## 2023-11-09 RX ORDER — ENOXAPARIN SODIUM 100 MG/ML
40 INJECTION SUBCUTANEOUS DAILY
Status: DISCONTINUED | OUTPATIENT
Start: 2023-11-10 | End: 2023-11-11

## 2023-11-09 RX ORDER — HYDROMORPHONE HCL IN WATER/PF 6 MG/30 ML
0.2 PATIENT CONTROLLED ANALGESIA SYRINGE INTRAVENOUS EVERY 4 HOURS PRN
Status: DISCONTINUED | OUTPATIENT
Start: 2023-11-09 | End: 2023-11-13 | Stop reason: HOSPADM

## 2023-11-09 RX ORDER — ALLOPURINOL 100 MG/1
100 TABLET ORAL DAILY
Status: DISCONTINUED | OUTPATIENT
Start: 2023-11-10 | End: 2023-11-13 | Stop reason: HOSPADM

## 2023-11-09 RX ORDER — ONDANSETRON 2 MG/ML
4 INJECTION INTRAMUSCULAR; INTRAVENOUS EVERY 6 HOURS PRN
Status: DISCONTINUED | OUTPATIENT
Start: 2023-11-09 | End: 2023-11-13 | Stop reason: HOSPADM

## 2023-11-09 RX ORDER — ACETAMINOPHEN 325 MG/1
650 TABLET ORAL ONCE
Status: COMPLETED | OUTPATIENT
Start: 2023-11-09 | End: 2023-11-09

## 2023-11-09 RX ORDER — SODIUM CHLORIDE, SODIUM GLUCONATE, SODIUM ACETATE, POTASSIUM CHLORIDE, MAGNESIUM CHLORIDE, SODIUM PHOSPHATE, DIBASIC, AND POTASSIUM PHOSPHATE .53; .5; .37; .037; .03; .012; .00082 G/100ML; G/100ML; G/100ML; G/100ML; G/100ML; G/100ML; G/100ML
75 INJECTION, SOLUTION INTRAVENOUS CONTINUOUS
Status: DISPENSED | OUTPATIENT
Start: 2023-11-10 | End: 2023-11-10

## 2023-11-09 RX ORDER — NICOTINE 21 MG/24HR
1 PATCH, TRANSDERMAL 24 HOURS TRANSDERMAL DAILY PRN
Status: DISCONTINUED | OUTPATIENT
Start: 2023-11-09 | End: 2023-11-13 | Stop reason: HOSPADM

## 2023-11-09 RX ORDER — ACETAMINOPHEN 325 MG/1
975 TABLET ORAL EVERY 8 HOURS SCHEDULED
Status: DISCONTINUED | OUTPATIENT
Start: 2023-11-10 | End: 2023-11-13 | Stop reason: HOSPADM

## 2023-11-09 RX ORDER — SENNOSIDES 8.6 MG
1 TABLET ORAL
Status: DISCONTINUED | OUTPATIENT
Start: 2023-11-09 | End: 2023-11-13 | Stop reason: HOSPADM

## 2023-11-09 RX ORDER — OXYCODONE HYDROCHLORIDE 5 MG/1
5 TABLET ORAL EVERY 6 HOURS PRN
Status: DISCONTINUED | OUTPATIENT
Start: 2023-11-09 | End: 2023-11-13 | Stop reason: HOSPADM

## 2023-11-09 RX ORDER — METHOCARBAMOL 500 MG/1
500 TABLET, FILM COATED ORAL EVERY 6 HOURS PRN
Status: DISCONTINUED | OUTPATIENT
Start: 2023-11-09 | End: 2023-11-13 | Stop reason: HOSPADM

## 2023-11-09 RX ADMIN — ACETAMINOPHEN 650 MG: 325 TABLET ORAL at 19:49

## 2023-11-09 RX ADMIN — METHOCARBAMOL 500 MG: 500 TABLET ORAL at 22:56

## 2023-11-09 RX ADMIN — HYDROMORPHONE HYDROCHLORIDE 0.2 MG: 0.2 INJECTION, SOLUTION INTRAMUSCULAR; INTRAVENOUS; SUBCUTANEOUS at 22:58

## 2023-11-09 RX ADMIN — Medication 2.5 MG: at 22:56

## 2023-11-10 ENCOUNTER — ANESTHESIA EVENT (INPATIENT)
Dept: PERIOP | Facility: HOSPITAL | Age: 73
DRG: 467 | End: 2023-11-10
Payer: MEDICARE

## 2023-11-10 ENCOUNTER — APPOINTMENT (INPATIENT)
Dept: CT IMAGING | Facility: HOSPITAL | Age: 73
DRG: 467 | End: 2023-11-10
Payer: MEDICARE

## 2023-11-10 PROBLEM — R33.9 URINE RETENTION: Status: ACTIVE | Noted: 2023-11-10

## 2023-11-10 LAB
ANION GAP SERPL CALCULATED.3IONS-SCNC: 8 MMOL/L
BACTERIA UR QL AUTO: ABNORMAL /HPF
BILIRUB UR QL STRIP: NEGATIVE
BUN SERPL-MCNC: 14 MG/DL (ref 5–25)
CALCIUM SERPL-MCNC: 8.3 MG/DL (ref 8.4–10.2)
CHLORIDE SERPL-SCNC: 105 MMOL/L (ref 96–108)
CLARITY UR: CLEAR
CO2 SERPL-SCNC: 25 MMOL/L (ref 21–32)
COLOR UR: YELLOW
CREAT SERPL-MCNC: 1.06 MG/DL (ref 0.6–1.3)
ERYTHROCYTE [DISTWIDTH] IN BLOOD BY AUTOMATED COUNT: 12.9 % (ref 11.6–15.1)
GFR SERPL CREATININE-BSD FRML MDRD: 52 ML/MIN/1.73SQ M
GLUCOSE SERPL-MCNC: 110 MG/DL (ref 65–140)
GLUCOSE UR STRIP-MCNC: NEGATIVE MG/DL
HCT VFR BLD AUTO: 39.4 % (ref 34.8–46.1)
HGB BLD-MCNC: 13.9 G/DL (ref 11.5–15.4)
HGB UR QL STRIP.AUTO: NEGATIVE
KETONES UR STRIP-MCNC: NEGATIVE MG/DL
LEUKOCYTE ESTERASE UR QL STRIP: NEGATIVE
MCH RBC QN AUTO: 33.7 PG (ref 26.8–34.3)
MCHC RBC AUTO-ENTMCNC: 35.3 G/DL (ref 31.4–37.4)
MCV RBC AUTO: 96 FL (ref 82–98)
MUCOUS THREADS UR QL AUTO: ABNORMAL
NITRITE UR QL STRIP: NEGATIVE
NON-SQ EPI CELLS URNS QL MICRO: ABNORMAL /HPF
PH UR STRIP.AUTO: 6 [PH]
PLATELET # BLD AUTO: 140 THOUSANDS/UL (ref 149–390)
PMV BLD AUTO: 11.5 FL (ref 8.9–12.7)
POTASSIUM SERPL-SCNC: 3.7 MMOL/L (ref 3.5–5.3)
PROT UR STRIP-MCNC: ABNORMAL MG/DL
RBC # BLD AUTO: 4.12 MILLION/UL (ref 3.81–5.12)
RBC #/AREA URNS AUTO: ABNORMAL /HPF
SODIUM SERPL-SCNC: 138 MMOL/L (ref 135–147)
SP GR UR STRIP.AUTO: 1.02 (ref 1–1.03)
UROBILINOGEN UR STRIP-ACNC: <2 MG/DL
WBC # BLD AUTO: 8.5 THOUSAND/UL (ref 4.31–10.16)
WBC #/AREA URNS AUTO: ABNORMAL /HPF

## 2023-11-10 PROCEDURE — 36415 COLL VENOUS BLD VENIPUNCTURE: CPT | Performed by: PHYSICIAN ASSISTANT

## 2023-11-10 PROCEDURE — 99223 1ST HOSP IP/OBS HIGH 75: CPT | Performed by: ORTHOPAEDIC SURGERY

## 2023-11-10 PROCEDURE — 99232 SBSQ HOSP IP/OBS MODERATE 35: CPT | Performed by: INTERNAL MEDICINE

## 2023-11-10 PROCEDURE — G1004 CDSM NDSC: HCPCS

## 2023-11-10 PROCEDURE — 80048 BASIC METABOLIC PNL TOTAL CA: CPT | Performed by: PHYSICIAN ASSISTANT

## 2023-11-10 PROCEDURE — 81001 URINALYSIS AUTO W/SCOPE: CPT | Performed by: INTERNAL MEDICINE

## 2023-11-10 PROCEDURE — 85027 COMPLETE CBC AUTOMATED: CPT | Performed by: PHYSICIAN ASSISTANT

## 2023-11-10 PROCEDURE — 73700 CT LOWER EXTREMITY W/O DYE: CPT

## 2023-11-10 RX ORDER — CEFAZOLIN SODIUM 2 G/50ML
2000 SOLUTION INTRAVENOUS
Status: COMPLETED | OUTPATIENT
Start: 2023-11-11 | End: 2023-11-11

## 2023-11-10 RX ORDER — HYDRALAZINE HYDROCHLORIDE 20 MG/ML
5 INJECTION INTRAMUSCULAR; INTRAVENOUS EVERY 6 HOURS PRN
Status: DISCONTINUED | OUTPATIENT
Start: 2023-11-10 | End: 2023-11-13 | Stop reason: HOSPADM

## 2023-11-10 RX ORDER — TRANEXAMIC ACID 10 MG/ML
1000 INJECTION, SOLUTION INTRAVENOUS
Status: ACTIVE | OUTPATIENT
Start: 2023-11-11 | End: 2023-11-12

## 2023-11-10 RX ADMIN — ACETAMINOPHEN 975 MG: 325 TABLET, FILM COATED ORAL at 21:06

## 2023-11-10 RX ADMIN — Medication 2.5 MG: at 04:47

## 2023-11-10 RX ADMIN — HYDRALAZINE HYDROCHLORIDE 5 MG: 20 INJECTION, SOLUTION INTRAMUSCULAR; INTRAVENOUS at 21:05

## 2023-11-10 RX ADMIN — ACETAMINOPHEN 975 MG: 325 TABLET, FILM COATED ORAL at 14:21

## 2023-11-10 RX ADMIN — Medication 2.5 MG: at 20:46

## 2023-11-10 RX ADMIN — SODIUM CHLORIDE, SODIUM GLUCONATE, SODIUM ACETATE, POTASSIUM CHLORIDE, MAGNESIUM CHLORIDE, SODIUM PHOSPHATE, DIBASIC, AND POTASSIUM PHOSPHATE 75 ML/HR: .53; .5; .37; .037; .03; .012; .00082 INJECTION, SOLUTION INTRAVENOUS at 00:08

## 2023-11-10 RX ADMIN — ALLOPURINOL 100 MG: 100 TABLET ORAL at 09:58

## 2023-11-10 RX ADMIN — ACETAMINOPHEN 975 MG: 325 TABLET, FILM COATED ORAL at 05:16

## 2023-11-10 NOTE — PROGRESS NOTES
[address] [facility]  Progress Note  Name: Tony Mcmillan  MRN: [id number]  Unit/Bed#: ED 07 I Date of Admission: 11/9/2023   Date of Service: 11/10/2023 I Hospital Day: 1    Assessment/Plan   * Fracture of neck of right femur [facility])  Assessment & Plan  S/p mechanical fall on to right side with resultant right femoral neck fracture surrounding present hardware  CT LE showed status post total right hip arthroplasty with a proximal right femoral diaphysis mildly displaced periprosthetic fracture. Spoke with ortho, plan for surgery tomorrow   Nonweightbearing to RLE . Can have diet now   NPO at midnight   Multimodal pain control       Urine retention  Assessment & Plan  Straight cath once, had 600 ml   Continue urine retention protocol. If fails, will place Goff . Likely in the setting of leg fracture and no ambulation    Gout  Assessment & Plan  Hx of gout - maintained of allopurinol - continue     Benign essential hypertension  Assessment & Plan  Not on any anti-HTN at home   BP acceptable   Monitor off anti-HTN for now          VTE Pharmacologic Prophylaxis: VTE Score: 7 High Risk (Score >/= 5) - Pharmacological DVT Prophylaxis Contraindicated. Sequential Compression Devices Ordered. Patient Centered Rounds: I performed bedside rounds with nursing staff today. Discussions with Specialists or Other Care Team Provider: cm , pt, ot, ortho    Education and Discussions with Family / Patient: Patient declined call to . Total Time Spent on Date of Encounter in care of patient: 40 mins. This time was spent on one or more of the following: performing physical exam; counseling and coordination of care; obtaining or reviewing history; documenting in the medical record; reviewing/ordering tests, medications or procedures; communicating with other healthcare professionals and discussing with patient's family/caregivers.     Current Length of Stay: 1 day(s)  Current Patient Status: Inpatient   Certification Statement: The patient will continue to require additional inpatient hospital stay due to fracture   Discharge Plan: Anticipate discharge in 48 hrs to home. Code Status: Level 1 - Full Code    Subjective:   Leg Pain with movements only, otherwise no other symptoms     Objective:     Vitals:   Temp (24hrs), Av.2 °F (36.8 °C), Min:97.8 °F (36.6 °C), Max:98.6 °F (37 °C)    Temp:  [97.8 °F (36.6 °C)-98.6 °F (37 °C)] 98.3 °F (36.8 °C)  HR:  [79-99] 83  Resp:  [16-20] 20  BP: (116-162)/() 120/97  SpO2:  [94 %-97 %] 94 %  Body mass index is 22.8 kg/m². Input and Output Summary (last 24 hours): Intake/Output Summary (Last 24 hours) at 11/10/2023 1140  Last data filed at 11/10/2023 0944  Gross per 24 hour   Intake 666.25 ml   Output 650 ml   Net 16.25 ml       Physical Exam:   Physical Exam  Vitals and nursing note reviewed. Constitutional:       General: She is not in acute distress. Appearance: She is not diaphoretic. HENT:      Head: Normocephalic. Eyes:      General: No scleral icterus. Right eye: No discharge. Left eye: No discharge. Cardiovascular:      Rate and Rhythm: Normal rate and regular rhythm. Pulmonary:      Effort: Pulmonary effort is normal. No respiratory distress. Breath sounds: Normal breath sounds. No wheezing, rhonchi or rales. Abdominal:      General: There is no distension. Palpations: Abdomen is soft. Tenderness: There is no abdominal tenderness. There is no guarding or rebound. Musculoskeletal:      Cervical back: Normal range of motion. Right lower leg: No edema. Left lower leg: No edema. Skin:     General: Skin is warm. Neurological:      Mental Status: She is alert and oriented to person, place, and time. Psychiatric:         Mood and Affect: Mood normal.         Behavior: Behavior normal.         Thought Content:  Thought content normal.         Judgment: Judgment normal. Additional Data:     Labs:  Results from last 7 days   Lab Units 11/10/23  0437 11/09/23  2233   WBC Thousand/uL 8.50 13.65*   HEMOGLOBIN g/dL 13.9 15.8*   HEMATOCRIT % 39.4 44.8   PLATELETS Thousands/uL 140* 165   NEUTROS PCT %  --  72   LYMPHS PCT %  --  20   MONOS PCT %  --  7   EOS PCT %  --  0     Results from last 7 days   Lab Units 11/10/23  0437   SODIUM mmol/L 138   POTASSIUM mmol/L 3.7   CHLORIDE mmol/L 105   CO2 mmol/L 25   BUN mg/dL 14   CREATININE mg/dL 1.06   ANION GAP mmol/L 8   CALCIUM mg/dL 8.3*   GLUCOSE RANDOM mg/dL 110     Results from last 7 days   Lab Units 11/09/23 2233   INR  1.07                   Lines/Drains:  Invasive Devices       Peripheral Intravenous Line  Duration             Peripheral IV 11/09/23 Distal;Dorsal (posterior); Right Forearm <1 day                          Imaging: Reviewed radiology reports from this admission including: CT LE, CXR, hip XR    Recent Cultures (last 7 days):         Last 24 Hours Medication List:   Current Facility-Administered Medications   Medication Dose Route Frequency Provider Last Rate    acetaminophen  975 mg Oral Q8H [facility] Brando Gardner PA-C      allopurinol  100 mg Oral Daily Brando Gardner PA-C      [START ON 11/11/2023] cefazolin  2,000 mg [facility]venous [facility]      enoxaparin  40 mg Subcutaneous Daily Brando Gardner PA-C      HYDROmorphone  0.2 mg Intravenous Q4H PRN Brando Gardner PA-C      iohexol  100 mL Intravenous Once in imaging Stephanie Pena MD      methocarbamol  500 mg Oral Q6H PRN Brando Gardner PA-C      multi-electrolyte  75 mL/hr Intravenous Continuous Brando Gardner PA-C 75 mL/hr (11/10/23 0008)    nicotine  1 patch Transdermal Daily PRN Brando Gardner PA-C      ondansetron  4 mg Intravenous Q6H PRN Brando Gardner PA-C      oxyCODONE  2.5 mg Oral Q6H PRN Brando Gardner PA-C      Or    oxyCODONE  5 mg Oral Q6H PRN Brando Gardner PA-C      senna  1 tablet Oral HS PRN Roxy Carson PA-C      [START ON 11/11/2023] tranexamic acid  1,000 mg Intravenous [facility]          Today, Patient Was Seen By: Brooklyn Mayanrd MD    **Please Note: This note may have been constructed using a voice recognition system. **

## 2023-11-10 NOTE — OCCUPATIONAL THERAPY NOTE
Occupational Therapy Cancellation    Patient Name: Diane Martin  QNPSN'M Date: 11/10/2023     11/10/23 0747   OT Last Visit   OT Visit Date 11/10/23   Note Type   Note type Cancelled Session   Cancel Reasons Medical status   Additional Comments OT orders received. Per ortho note, pt with R femur fx with possible need for sx pending further scans. OT to follow and evaluate as appropriate once plan of care has been determined.      [employer], MS, OTR/L

## 2023-11-10 NOTE — ASSESSMENT & PLAN NOTE
Not on any anti-HTN at home   BP slightly elevated - suspect s/d to pain   Monitor off anti-HTN for now

## 2023-11-10 NOTE — ANESTHESIA PREPROCEDURE EVALUATION
Procedure:  ARTHROPLASTY HIP TOTAL REVISION, ORIF Femur (Right: Hip)    Relevant Problems   CARDIO   (+) HTN (hypertension)      MUSCULOSKELETAL   (+) Back pain, chronic   (+) Generalized osteoarthritis   (+) Gout      NEURO/PSYCH   (+) Back pain, chronic      Musculoskeletal and Integument   (+) Fracture of neck of right femur (HCC)      S/p mechanical fall on to right side with resultant right femoral neck fracture surrounding present hardware   history of a right total hip arthroplasty by Dr. Roberto Duffy at [facility] in [facility]18 with no complications. Physical Exam    Airway    Mallampati score: III  TM Distance: >3 FB  Neck ROM: full     Dental   Comment: Right upper molar is loose     Cardiovascular      Pulmonary      Other Findings        Anesthesia Plan  ASA Score- 2     Anesthesia Type- general with ASA Monitors. Additional Monitors:     Airway Plan: ETT. Comment: GA with ETT, IV, antiemetics. Plan Factors-    Chart reviewed. Existing labs reviewed. Patient summary reviewed. Patient is not a current smoker. Patient did not smoke on day of surgery. Induction- intravenous. Postoperative Plan- . Planned trial extubation    Informed Consent- Anesthetic plan and risks discussed with patient. I personally reviewed this patient with the CRNA. Discussed and agreed on the Anesthesia Plan with the CRNA. Brandie Barcenas

## 2023-11-10 NOTE — ASSESSMENT & PLAN NOTE
S/p mechanical fall on to right side with resultant right femoral neck fracture surrounding present hardware  CT LE showed status post total right hip arthroplasty with a proximal right femoral diaphysis mildly displaced periprosthetic fracture. Spoke with ortho, plan for surgery tomorrow   Nonweightbearing to RLE .   Can have diet now   NPO at midnight   Multimodal pain control

## 2023-11-10 NOTE — ASSESSMENT & PLAN NOTE
S/p mechanical fall on to right side with resultant right femoral neck fracture surrounding present hardware  NPO at midnight   Multimodal pain control   Ortho consult   Pre-op CXR, EKG, and labs pending

## 2023-11-10 NOTE — PLAN OF CARE
Normal 3 hr GTT conveyed via myaurora.   Problem: MOBILITY - ADULT  Goal: Maintain or return to baseline ADL function  Description: INTERVENTIONS:  -  Assess patient's ability to carry out ADLs; assess patient's baseline for ADL function and identify physical deficits which impact ability to perform ADLs (bathing, care of mouth/teeth, toileting, grooming, dressing, etc.)  - Assess/evaluate cause of self-care deficits   - Assess range of motion  - Assess patient's mobility; develop plan if impaired  - Assess patient's need for assistive devices and provide as appropriate  - Encourage maximum independence but intervene and supervise when necessary  - Involve family in performance of ADLs  - Assess for home care needs following discharge   - Consider OT consult to assist with ADL evaluation and planning for discharge  - Provide patient education as appropriate  Outcome: Progressing  Goal: Maintains/Returns to pre admission functional level  Description: INTERVENTIONS:  - Perform BMAT or MOVE assessment daily.   - Set and communicate daily mobility goal to care team and patient/family/caregiver.    - Collaborate with rehabilitation services on mobility goals if consulted    Problem: PAIN - ADULT  Goal: Verbalizes/displays adequate comfort level or baseline comfort level  Description: Interventions:  - Encourage patient to monitor pain and request assistance  - Assess pain using appropriate pain scale  - Administer analgesics based on type and severity of pain and evaluate response  - Implement non-pharmacological measures as appropriate and evaluate response  - Consider cultural and social influences on pain and pain management  - Notify physician/advanced practitioner if interventions unsuccessful or patient reports new pain  Outcome: Progressing     Problem: INFECTION - ADULT  Goal: Absence or prevention of progression during hospitalization  Description: INTERVENTIONS:  - Assess and monitor for signs and symptoms of infection  - Monitor lab/diagnostic results  - Monitor all insertion sites, i.e. indwelling lines, tubes, and drains  - Monitor endotracheal if appropriate and nasal secretions for changes in amount and color  - [place] appropriate cooling/warming therapies per order  - Administer medications as ordered  - Instruct and encourage patient and family to use good hand hygiene technique  - Identify and instruct in appropriate isolation precautions for identified infection/condition  Outcome: Progressing  Goal: Absence of fever/infection during neutropenic period  Description: INTERVENTIONS:  - Monitor WBC    Outcome: Progressing     Problem: SAFETY ADULT  Goal: Maintain or return to baseline ADL function  Description: INTERVENTIONS:  -  Assess patient's ability to carry out ADLs; assess patient's baseline for ADL function and identify physical deficits which impact ability to perform ADLs (bathing, care of mouth/teeth, toileting, grooming, dressing, etc.)  - Assess/evaluate cause of self-care deficits   - Assess range of motion  - Assess patient's mobility; develop plan if impaired  - Assess patient's need for assistive devices and provide as appropriate  - Encourage maximum independence but intervene and supervise when necessary  - Involve family in performance of ADLs  - Assess for home care needs following discharge   - Consider OT consult to assist with ADL evaluation and planning for discharge  - Provide patient education as appropriate  Outcome: Progressing  Goal: Maintains/Returns to pre admission functional level  Description: INTERVENTIONS:  - Perform BMAT or MOVE assessment daily.   - Set and communicate daily mobility goal to care team and patient/family/caregiver.    - Collaborate with rehabilitation services on mobility goals if consulted    Problem: Knowledge Deficit  Goal: Patient/family/caregiver demonstrates understanding of disease process, treatment plan, medications, and discharge instructions  Description: Complete learning assessment and assess knowledge base.   Interventions:  - Provide teaching at level of understanding  - Provide teaching via preferred learning methods  Outcome: Progressing     Problem: DISCHARGE PLANNING  Goal: Discharge to home or other facility with appropriate resources  Description: INTERVENTIONS:  - Identify barriers to discharge w/patient and caregiver  - Arrange for needed discharge resources and transportation as appropriate  - Identify discharge learning needs (meds, wound care, etc.)  - Arrange for interpretive services to assist at discharge as needed  - Refer to Case Management Department for coordinating discharge planning if the patient needs post-hospital services based on physician/advanced practitioner order or complex needs related to functional status, cognitive ability, or social support system  Outcome: Progressing     - Out of bed for toileting  - Record patient progress and toleration of activity level   Outcome: Progressing  Goal: Patient will remain free of falls  Description: INTERVENTIONS:  - Educate patient/family on patient safety including physical limitations  - Instruct patient to call for assistance with activity   - Consult OT/PT to assist with strengthening/mobility   - Keep Call bell within reach  - Keep bed low and locked with side rails adjusted as appropriate  - Keep care items and personal belongings within reach  - Initiate and maintain comfort rounds  - Make Fall Risk Sign visible to staff    - Apply yellow socks and bracelet for high fall risk patients  - Consider moving patient to room near nurses station  Outcome: Progressing       - Out of bed for toileting  - Record patient progress and toleration of activity level   Outcome: Progressing

## 2023-11-10 NOTE — CONSULTS
Consultation - [address] [facility] [address] 68 y.o. female MRN: [id number]  Unit/Bed#: ED 07 Encounter: [id number]      Assessment/Plan     Assessment:  Right nondisplaced periprosthetic femur fracture    Plan: We will get a CT scan of the right hip to evaluate fracture pattern and to see if prosthesis is stable. CT scan reviewed by Dr. Huang Harvey and total joint specialist Dr. Walda Litten. Surgery is indicated for the fracture and may be done tomorrow by Dr. Walda Litten depending on when equipment can be delivered to hospital.   This was discussed with patient. NPO at midnight  Nonweightbearing to right lower extremity  DVT prophylaxis: Mechanical only    History of Present Illness   Physician Requesting Consult: Kathie Lane MD  Reason for Consult / Principal Problem: right hip pain  HPI: Cj Voss is a 68y.o. year old female who is a community ambulator with no assistance presents with right hip pain after experiencing a mechanical fall in her garage yesterday. Patient states she lost her balance and fell on her right hip. She had immediate pain and had difficulty getting up. EMS was called and she was brought to the emergency room. X-rays revealed a right hip periprosthetic fracture. She has a history of a right total hip arthroplasty by Dr. Bruce Johansen at [facility] in [facility] with no complications. No issues with that hip leading up to the injury. She has been admitted to the medical service and orthopedics was consulted. Inpatient consult to Orthopedic Surgery  Consult performed by: Harpreet Nowak PA-C  Consult ordered by: Tracy Key PA-C          Review of Systems   Constitutional: Negative. HENT: Negative. Eyes: Negative. Respiratory: Negative. Cardiovascular: Negative. Gastrointestinal: Negative. Endocrine: Negative. Genitourinary: Negative. Musculoskeletal:  Positive for arthralgias, gait problem and joint swelling. Skin: Negative.     Allergic/Immunologic: Negative. Hematological: Negative. Psychiatric/Behavioral: Negative. Historical Information   History reviewed. No pertinent past medical history. Past Surgical History:   Procedure Laterality Date    REPLACEMENT TOTAL KNEE       Social History   Social History     Substance and Sexual Activity   Alcohol Use Not Currently     Social History     Substance and Sexual Activity   Drug Use Never     E-Cigarette/Vaping    E-Cigarette Use Never User      E-Cigarette/Vaping Substances    Nicotine No     THC No     CBD No     Flavoring No     Other No     Unknown No      Social History     Tobacco Use   Smoking Status Every Day    Packs/day: 1.00    Years: 50.00    Total pack years: 50.00    Types: Cigarettes   Smokeless Tobacco Never     Family History: non-contributory    Meds/Allergies   all current active meds have been reviewed  Allergies   Allergen Reactions    Codeine GI Intolerance    Medical Tape Other (See Comments)     "redness"       Objective   Vitals: Blood pressure 141/83, pulse 84, temperature 98.6 °F (37 °C), temperature source Oral, resp. rate 19, height 5' 5" (1.651 m), weight 62.1 kg (137 lb), SpO2 94 %, not currently breastfeeding. ,Body mass index is 22.8 kg/m². Intake/Output Summary (Last 24 hours) at 11/10/2023 0714  Last data filed at 11/10/2023 0521  Gross per 24 hour   Intake 66.25 ml   Output 0 ml   Net 66.25 ml     I/O last 24 hours: In: 66.3 [I.V.:66.3]  Out: 0     Invasive Devices       Peripheral Intravenous Line  Duration             Peripheral IV 11/09/23 Distal;Dorsal (posterior); Right Forearm <1 day                    Physical Exam  Constitutional:       General: She is not in acute distress. Appearance: She is well-developed. HENT:      Head: Normocephalic and atraumatic. Eyes:      Conjunctiva/sclera: Conjunctivae normal.      Pupils: Pupils are equal, round, and reactive to light. Cardiovascular:      Rate and Rhythm: Normal rate and regular rhythm. Heart sounds: Normal heart sounds. No murmur heard. Pulmonary:      Effort: Pulmonary effort is normal. No respiratory distress. Breath sounds: Normal breath sounds. Abdominal:      General: Bowel sounds are normal.      Palpations: Abdomen is soft. Tenderness: There is no abdominal tenderness. Musculoskeletal:         General: Swelling and tenderness present. Cervical back: Normal range of motion. Skin:     General: Skin is warm and dry. Neurological:      Mental Status: She is alert and oriented to person, place, and time. Psychiatric:         Mood and Affect: Mood normal.       Right Hip Exam     Tenderness   Right hip tenderness location: proximal femur. Other   Erythema: absent  Scars: present  Sensation: normal  Pulse: present    Comments:  Pain with log roll  Thigh and calf compartments are soft and compressible  Actively moving ankle and toes  Toes warm and well perfused            Lab Results: I have personally reviewed pertinent lab results. Imaging Studies: I have personally reviewed pertinent films in PACS  X-ray right hip: nondisplaced periprosthetic proximal femur fracture. Prosthesis intact.

## 2023-11-10 NOTE — PLAN OF CARE
Problem: MOBILITY - ADULT  Goal: Maintain or return to baseline ADL function  Description: INTERVENTIONS:  -  Assess patient's ability to carry out ADLs; assess patient's baseline for ADL function and identify physical deficits which impact ability to perform ADLs (bathing, care of mouth/teeth, toileting, grooming, dressing, etc.)  - Assess/evaluate cause of self-care deficits   - Assess range of motion  - Assess patient's mobility; develop plan if impaired  - Assess patient's need for assistive devices and provide as appropriate  - Encourage maximum independence but intervene and supervise when necessary  - Involve family in performance of ADLs  - Assess for home care needs following discharge   - Consider OT consult to assist with ADL evaluation and planning for discharge  - Provide patient education as appropriate  Outcome: Progressing  Goal: Maintains/Returns to pre admission functional level  Description: INTERVENTIONS:  - Perform BMAT or MOVE assessment daily.   - Set and communicate daily mobility goal to care team and patient/family/caregiver. - Collaborate with rehabilitation services on mobility goals if consulted  - Perform Range of Motion 3 times a day. - Reposition patient every 2 hours.   - Dangle patient 2 times a day  - Stand patient 2 times a day  - Ambulate patient 2 times a day  - Out of bed to chair 2 times a day   - Out of bed for meals 2 times a day  - Out of bed for toileting  - Record patient progress and toleration of activity level   Outcome: Progressing

## 2023-11-10 NOTE — CASE MANAGEMENT
Case Management Assessment & Discharge Planning Note    Patient name Brittanie Comber  Location [address] [facility] [place] [address]/-75 MRN [id number]  : 1950 Date 11/10/2023       Current Admission Date: 2023  Current Admission Diagnosis:Fracture of neck of right femur [facility])   Patient Active Problem List    Diagnosis Date Noted    Urine retention 11/10/2023    Fracture of neck of right femur ([address]) 2023    Head injury 10/12/2020    Suture check 10/12/2020    Gout 08/15/2017    HTN (hypertension) 08/15/2017    Abnormal mammogram 2017    Acute arthropathy 2017    Candidal intertrigo 2016    Dyshidrotic eczema 2016    Back pain, chronic 2016    Hip pain 2016    Benign essential hypertension 2015    Chronic sinusitis 2015    Generalized osteoarthritis 2015      LOS (days): 1  Geometric Mean LOS (GMLOS) (days): 2.80  Days to GMLOS:2     OBJECTIVE:    Risk of Unplanned Readmission Score: 6.77         Current admission status: Inpatient  Referral Reason: Other (Post acute needs. Pt/OT to see post op)    Preferred Pharmacy:   [facility] [address] [place] [address] [address] [place] [address]  Phone: [phone] Fax: [phone]    Primary Care Provider: Silvino Duran DO    Primary Insurance: MEDICARE  Secondary Insurance:  FOR LIFE    ASSESSMENT:  [place] Proxies    There are no active Health Care Proxies on file.        Advance Directives  Does patient have a [address]?: No  Was patient offered paperwork?: Yes (declined)  Does patient currently have a Health Care decision maker?: Yes, please see Health Care Proxy section  Does patient have Advance Directives?: No  Was patient offered paperwork?: Yes (declined)  Primary Contact: Rad Pacheco         Readmission Root Cause  30 Day Readmission: No    Patient Information  Admitted from[de-identified] Home  Mental Status: Alert  During Assessment patient was accompanied by: Not accompanied during assessment  Assessment information provided by[de-identified] Patient  Primary Caregiver: Self  Support Systems: Self, Spouse/significant other, Family members  [place] of Residence: [address] [facility] do you live in?: [facility] entry access options.  Select all that apply.: No steps to enter home, Ramp  Type of Current Residence: Ranch  In the last 12 months, was there a time when you were not able to pay the mortgage or rent on time?: No  In the last 12 months, how many places have you lived?: 1  In the last 12 months, was there a time when you did not have a steady place to sleep or slept in a shelter (including now)?: No  Homeless/housing insecurity resource given?: N/A  Living Arrangements: Lives w/ Spouse/significant other  Is patient a ?: No    Activities of Daily Living Prior to Admission  Functional Status: Independent  Completes ADLs independently?: Yes  Ambulates independently?: Yes  Does patient use assisted devices?: No  Does patient currently own DME?: Yes  What DME does the patient currently own?: [employer]  Does patient have a history of Outpatient Therapy (PT/OT)?: Yes  Does the patient have a history of Short-Term Rehab?: No  Does patient have a history of HHC?: Yes  Does patient currently have [facility]?: No         Patient Information Continued  Income Source: Pension/prison  Does patient have prescription coverage?: Yes  Within the past 12 months, you worried that your food would run out before you got the money to buy more.: Never true  Within the past 12 months, the food you bought just didn't last and you didn't have money to get more.: Never true  Food insecurity resource given?: N/A  Does patient receive dialysis treatments?: No  Does patient have a history of substance abuse?: No  Does patient have a history of Mental Health Diagnosis?: No         Means of Transportation  Means of Transport to Appts[de-identified] Drives Self  In the past 12 months, has lack of transportation kept you from medical appointments or from getting medications?: No  In the past 12 months, has lack of transportation kept you from meetings, work, or from getting things needed for daily living?: No  Was application for public transport provided?: N/A        DISCHARGE DETAILS:    Discharge planning discussed with[de-identified] Pt at bedside in the ED  Freedom of Choice: Yes  Comments - Freedom of Choice: discussed dc planning and role of CM  CM contacted family/caregiver?: No- see comments (pt alert and indpt in room)  Were Treatment Team discharge recommendations reviewed with patient/caregiver?: Yes  Did patient/caregiver verbalize understanding of patient care needs?: Yes       Contacts  Reason/Outcome: Discharge [address]         Is the patient interested in [facility] at discharge?: No (pending post op needs)    DME Referral Provided  Referral made for DME?: No    Other Referral/Resources/Interventions Provided:  Interventions: HHC, Short Term Rehab, DME  Referral Comments: Pts needs pending hip revision and pt/ot eval            Discharge Destination Plan[de-identified] Home with [address] [facility], Short Term Rehab  Transport at Discharge : Wheelchair Daniela, Family                             IMM Given (Date):: 11/10/23        Additional Comments: Cm met with pt in the ED. Pt here with multiple injuries, fall at home. rt femur fracture. Pt to have revision done. PT/OT to eval post op for dc needs. Pt lives in a ranch style home with her spouse. There is a ramp to enter the home due to her spouse's needs. Pt states that she reyez snot use any DME. She was indpt PTA. There is a chir lift in their home for the basement. If needed pt does have a walker at home. Pt has a hx of HHC. She has never been to [facility]. Pt has particiapted in Op PT before. She has no MH/Da hx. Pt reports that she drives and sees a [facility] PCP. Pt has  secondary through her spouse.  Cm waiting for PT/Ot eval

## 2023-11-10 NOTE — H&P
[address] [facility]  H&P  Name: Jose C Whitten 68 y.o. female I MRN: [id number]  Unit/Bed#: ED 10 I Date of Admission: 11/9/2023   Date of Service: 11/9/2023 I Hospital Day: 0      Assessment/Plan   * Fracture of neck of right femur [facility])  Assessment & Plan  S/p mechanical fall on to right side with resultant right femoral neck fracture surrounding present hardware  NPO at midnight   Multimodal pain control   Ortho consult   Pre-op CXR, EKG, and labs pending     Benign essential hypertension  Assessment & Plan  Not on any anti-HTN at home   BP slightly elevated - suspect s/d to pain   Monitor off anti-HTN for now     Gout  Assessment & Plan  Hx of gout - maintained of allopurinol - continue            VTE Pharmacologic Prophylaxis: VTE Score: 7 High Risk (Score >/= 5) - Pharmacological DVT Prophylaxis Ordered: lovenox ordered for postop. Sequential Compression Devices Ordered. Code Status: Level 1 - Full Code   Discussion with family: Updated  (daughter and granddaughter) at bedside. Anticipated Length of Stay: Patient will be admitted on an inpatient basis with an anticipated length of stay of greater than 2 midnights secondary to right femoral neck fracture . Total Time Spent on Date of Encounter in care of patient: 65 mins. This time was spent on one or more of the following: performing physical exam; counseling and coordination of care; obtaining or reviewing history; documenting in the medical record; reviewing/ordering tests, medications or procedures; communicating with other healthcare professionals and discussing with patient's family/caregivers. Chief Complaint: "I went boom"    History of Present Illness:  Jose C Whitten is a 68 y.o. female with a PMH of muliple prior joint replacements due to OA and gout who presents s/p fall on to right hip this evening. Pt reports she was leaf blowing her driveway.  After putting the leaf blower away, she lost her balance causing her to fall on to her right hip in her garage. No head strike or LOC. Neighbor helped her in to the house. Denies any preceding symptoms of focal numbness or weakness, no HA, no vision changes, lightheadedness, dizziness, CP, palpitations, or dyspnea. Review of Systems:  Review of Systems   Eyes:  Negative for visual disturbance. Respiratory:  Negative for shortness of breath. Cardiovascular:  Negative for chest pain. Gastrointestinal:  Negative for abdominal pain. Genitourinary:  Negative for difficulty urinating. Musculoskeletal:  Positive for arthralgias and gait problem. Neurological:  Negative for dizziness, weakness, light-headedness, numbness and headaches. All other systems reviewed and are negative. Past Medical and Surgical History:   History reviewed. No pertinent past medical history. Past Surgical History:   Procedure Laterality Date    REPLACEMENT TOTAL KNEE         Meds/Allergies:  Prior to Admission medications    Medication Sig Start Date End Date Taking?  Authorizing Provider   allopurinol (ZYLOPRIM) 100 mg tablet Take 1 tablet (100 mg total) by mouth daily 10/5/23  Yes Florian Minor DO   celecoxib (CeleBREX) 100 mg capsule Take 1 capsule (100 mg total) by mouth 2 (two) times a day 10/5/23  Yes Florian Minor, DO   DAILY MULTIPLE VITAMINS/IRON TABS Take 1 tablet by mouth daily   Yes Historical Provider, MD   buPROPion (Wellbutrin XL) 150 mg 24 hr tablet Take 1 tablet (150 mg total) by mouth every morning 7/13/22 11/9/23  Florian Minor DO   varenicline (CHANTIX) 0.5 mg tablet Take 1 tab daily for 3 days, one tab BID for 4 days  Patient not taking: No sig reported 6/6/22 11/9/23  Florian Minor DO   varenicline (CHANTIX) 1 mg tablet Take 1 tablet (1 mg total) by mouth 2 (two) times a day  Patient not taking: No sig reported 6/6/22 11/9/23  Florian Minor DO   varenicline (CHANTIX) 1 mg tablet Take 1 tablet (1 mg total) by mouth 2 (two) times a day  Patient not taking: No sig reported 7/8/22 11/9/23  Grantlm Fanny Page, DO     I have reviewed home medications with patient personally. Allergies: Allergies   Allergen Reactions    Codeine GI Intolerance    Medical Tape Other (See Comments)     "redness"       Social History:  Marital Status: /Civil Union   Occupation: retired   Patient Pre-hospital Living Situation: Home, With spouse  Patient Pre-hospital Level of Mobility: walks  Patient Pre-hospital Diet Restrictions: none   Substance Use History:   Social History     Substance and Sexual Activity   Alcohol Use Not Currently     Social History     Tobacco Use   Smoking Status Every Day    Packs/day: 1.00    Years: 50.00    Total pack years: 50.00    Types: Cigarettes   Smokeless Tobacco Never     Social History     Substance and Sexual Activity   Drug Use Never       Family History:  Family History   Problem Relation Age of Onset    Hypertension Mother     Aortic aneurysm Family         abdominal    Brain cancer Family     Hypertension Family     Stroke Family         snydrome       Physical Exam:     Vitals:   Blood Pressure: 158/100 (11/09/23 2030)  Pulse: 96 (11/09/23 2030)  Temperature: 97.8 °F (36.6 °C) (11/09/23 1905)  Temp Source: Oral (11/09/23 1905)  Respirations: 17 (11/09/23 2030)  Height: 5' 5" (165.1 cm) (11/09/23 1902)  Weight - Scale: 62.1 kg (137 lb) (11/09/23 1902)  SpO2: 97 % (11/09/23 2030)    Physical Exam  Vitals and nursing note reviewed. Constitutional:       General: She is not in acute distress. Appearance: Normal appearance. She is not ill-appearing. Comments: Conversational.    HENT:      Head: Normocephalic. Nose: Nose normal.      Mouth/Throat:      Mouth: Mucous membranes are dry. Eyes:      Extraocular Movements: Extraocular movements intact. Conjunctiva/sclera: Conjunctivae normal.   Cardiovascular:      Rate and Rhythm: Normal rate and regular rhythm.    Pulmonary:      Effort: Pulmonary effort is normal.      Breath sounds: Normal breath sounds. Abdominal:      General: Abdomen is flat. Palpations: Abdomen is soft. Musculoskeletal:      Cervical back: Normal range of motion. Right lower leg: No edema. Left lower leg: No edema. Comments: Decreased ROM of right hip with flexion due to pain. Skin:     General: Skin is warm and dry. Coloration: Skin is not pale. Neurological:      Mental Status: She is alert. Comments: Oriented to self, place, month, year, and president. Motor 5/5 through b/l UE and LE except for limited strength in hip flexion of RLE due to pain. Sensation equal and intact in b/l UE and LE.   EOMI. No facial asymmetry or sensory deficits. No asphasia or dysarthria. Psychiatric:         Mood and Affect: Mood normal.          Additional Data:     Lab Results:                            Lines/Drains:  Invasive Devices       Peripheral Intravenous Line  Duration             Peripheral IV 11/09/23 Distal;Dorsal (posterior); Right Forearm <1 day                        Imaging: Personally reviewed the following imaging: Pelvic XR with femoral neck fracture, hardware intact      EKG and Other Studies Reviewed on Admission:   EKG:  EKG ordered. ** Please Note: This note has been constructed using a voice recognition system.  **

## 2023-11-10 NOTE — ASSESSMENT & PLAN NOTE
S/p mechanical fall on to right side with resultant right femoral neck fracture surrounding present hardware  CT LE showed status post total right hip arthroplasty with a proximal right femoral diaphysis mildly displaced periprosthetic fracture.    POD 0 for right hip revision periprosthetic fracture  Continue pain control  PT/OT eval

## 2023-11-10 NOTE — ED PROVIDER NOTES
History  Chief Complaint   Patient presents with    Fall     Pt fell in the garage and lost her balance. Pt hit the shelf and couldn't get up so called ems. no head strike, no loc, no blood thinners. Pt reports right hip pain. 61-year-old female presents for evaluation of right hip pain that occurred when she had a mechanical fall in her garage. The patient states that she was walking up in the driveway became slightly fatigued and then was moving some leaves and fell in her garage. She states that she broke the fall with her forearm against a shelf. The patient denies any bony pain in the forearm or restricted range of motion. The patient then landed on her right hip. The patient denies striking her head or having a loss of consciousness. The patient is complaining of isolated pain in the right hip which is worse with movement. The patient has a history of bilateral hip replacement many years ago. Fall      Prior to Admission Medications   Prescriptions Last Dose Informant Patient Reported? Taking? DAILY MULTIPLE VITAMINS/IRON TABS 11/9/2023 Self Yes Yes   Sig: Take 1 tablet by mouth daily   allopurinol (ZYLOPRIM) 100 mg tablet 11/9/2023  No Yes   Sig: Take 1 tablet (100 mg total) by mouth daily   celecoxib (CeleBREX) 100 mg capsule 11/9/2023  No Yes   Sig: Take 1 capsule (100 mg total) by mouth 2 (two) times a day      Facility-Administered Medications: None       History reviewed. No pertinent past medical history. Past Surgical History:   Procedure Laterality Date    REPLACEMENT TOTAL KNEE         Family History   Problem Relation Age of Onset    Hypertension Mother     Aortic aneurysm Family         abdominal    Brain cancer Family     Hypertension Family     Stroke Family         snydrome     I have reviewed and agree with the history as documented.     E-Cigarette/Vaping    E-Cigarette Use Never User      E-Cigarette/Vaping Substances    Nicotine No     THC No     CBD No     Flavoring No Other No     Unknown No      Social History     Tobacco Use    Smoking status: Every Day     Packs/day: 1.00     Years: 50.00     Total pack years: 50.00     Types: Cigarettes    Smokeless tobacco: Never   Vaping Use    Vaping Use: Never used   Substance Use Topics    Alcohol use: Not Currently    Drug use: Never       Review of Systems    Physical Exam  Physical Exam  Vitals and nursing note reviewed. Constitutional:       General: She is not in acute distress. Appearance: She is well-developed. HENT:      Head: Normocephalic and atraumatic. Right Ear: External ear normal.      Left Ear: External ear normal.   Eyes:      General: No scleral icterus. Conjunctiva/sclera: Conjunctivae normal.      Pupils: Pupils are equal, round, and reactive to light. Cardiovascular:      Rate and Rhythm: Normal rate and regular rhythm. Pulses:           Dorsalis pedis pulses are 1+ on the right side. Posterior tibial pulses are 1+ on the right side. Heart sounds: Normal heart sounds. Pulmonary:      Effort: Pulmonary effort is normal. No respiratory distress. Breath sounds: Normal breath sounds. Abdominal:      General: Bowel sounds are normal.      Palpations: Abdomen is soft. Tenderness: There is no abdominal tenderness. There is no guarding or rebound. Musculoskeletal:      Cervical back: Normal range of motion. Right hip: Bony tenderness present. No deformity or lacerations. Decreased range of motion. Skin:     General: Skin is warm and dry. Capillary Refill: Capillary refill takes less than 2 seconds. Findings: No rash. Neurological:      General: No focal deficit present. Mental Status: She is alert and oriented to person, place, and time.        Vital Signs  ED Triage Vitals   Temperature Pulse Respirations Blood Pressure SpO2   11/09/23 1905 11/09/23 1902 11/09/23 1902 11/09/23 1902 11/09/23 1902   97.8 °F (36.6 °C) 99 18 160/100 95 %      Temp Source Heart Rate Source Patient Position - Orthostatic VS BP Location FiO2 (%)   11/09/23 1905 11/09/23 1902 11/09/23 1902 11/09/23 1902 --   Oral Monitor Sitting Right arm       Pain Score       11/09/23 1902       6           Vitals:    11/09/23 1915 11/09/23 2030 11/09/23 2315 11/09/23 2330   BP: (!) 162/107 158/100 127/85 131/89   Pulse: 98 96 91 93   Patient Position - Orthostatic VS: Sitting            Visual Acuity      ED Medications  Medications   allopurinol (ZYLOPRIM) tablet 100 mg (has no administration in time range)   multi-electrolyte (PLASMALYTE-A/ISOLYTE-S PH 7.4) IV solution (has no administration in time range)   acetaminophen (TYLENOL) tablet 975 mg (has no administration in time range)   senna (SENOKOT) tablet 8.6 mg (has no administration in time range)   ondansetron (ZOFRAN) injection 4 mg (has no administration in time range)   nicotine (NICODERM CQ) 21 mg/24 hr TD 24 hr patch 1 patch (has no administration in time range)   enoxaparin (LOVENOX) subcutaneous injection 40 mg (has no administration in time range)   oxyCODONE (ROXICODONE) split tablet 2.5 mg (2.5 mg Oral Given 11/9/23 2256)     Or   oxyCODONE (ROXICODONE) IR tablet 5 mg ( Oral See Alternative 11/9/23 2256)   HYDROmorphone HCl (DILAUDID) injection 0.2 mg (0.2 mg Intravenous Given 11/9/23 2258)   methocarbamol (ROBAXIN) tablet 500 mg (500 mg Oral Given 11/9/23 2256)   acetaminophen (TYLENOL) tablet 650 mg (650 mg Oral Given 11/9/23 1949)       Diagnostic Studies  Results Reviewed       Procedure Component Value Units Date/Time    Basic metabolic panel [[id number]] Collected: 11/09/23 2233    Lab Status: Final result Specimen: Blood from Arm, Right Updated: 11/09/23 2256     Sodium 137 mmol/L      Potassium 3.5 mmol/L      Chloride 104 mmol/L      CO2 24 mmol/L      ANION GAP 9 mmol/L      BUN 13 mg/dL      Creatinine 1.04 mg/dL      Glucose 116 mg/dL      Calcium 9.5 mg/dL      eGFR 53 ml/min/1.73sq m     Narrative:      Consolidated [employer] guidelines for Chronic Kidney Disease (CKD):     Stage 1 with normal or high GFR (GFR > 90 mL/min/1.73 square meters)    Stage 2 Mild CKD (GFR = 60-89 mL/min/1.73 square meters)    Stage 3A Moderate CKD (GFR = 45-59 mL/min/1.73 square meters)    Stage 3B Moderate CKD (GFR = 30-44 mL/min/1.73 square meters)    Stage 4 Severe CKD (GFR = 15-29 mL/min/1.73 square meters)    Stage 5 End Stage CKD (GFR <15 mL/min/1.73 square meters)  Note: GFR calculation is accurate only with a steady state creatinine    Protime-INR [[id number]]  (Normal) Collected: 11/09/23 2233    Lab Status: Final result Specimen: Blood from Arm, Right Updated: 11/09/23 2251     Protime 14.3 seconds      INR 1.07    APTT [[id number]]  (Normal) Collected: 11/09/23 2233    Lab Status: Final result Specimen: Blood from Arm, Right Updated: 11/09/23 2251     PTT 29 seconds     CBC and differential [[id number]]  (Abnormal) Collected: 11/09/23 2233    Lab Status: Final result Specimen: Blood from Arm, Right Updated: 11/09/23 2239     WBC 13.65 Thousand/uL      RBC 4.72 Million/uL      Hemoglobin 15.8 g/dL      Hematocrit 44.8 %      MCV 95 fL      MCH 33.5 pg      MCHC 35.3 g/dL      RDW 12.9 %      MPV 11.6 fL      Platelets 739 Thousands/uL      nRBC 0 /100 WBCs      Neutrophils Relative 72 %      Immat GRANS % 0 %      Lymphocytes Relative 20 %      Monocytes Relative 7 %      Eosinophils Relative 0 %      Basophils Relative 1 %      Neutrophils Absolute 9.80 Thousands/µL      Immature Grans Absolute 0.06 Thousand/uL      Lymphocytes Absolute 2.69 Thousands/µL      Monocytes Absolute 1.01 Thousand/µL      Eosinophils Absolute 0.02 Thousand/µL      Basophils Absolute 0.07 Thousands/µL                    XR hip/pelv 2-3 vws right   ED Interpretation by Danny Cope DO (11/09 2016)   Proximal femur fracture, hardware appears intact      XR chest 1 view portable    (Results Pending)              Procedures  Procedures         ED Course  ED Course as of 11/10/23 0011   Thu Nov 09, 2023 2016 Ortho paged regarding femur fracture   2025 DIscussed with Dr. Adriel Encarnacion from ortho, advised admit to [facility] with plan for operative management   2050 I discussed the case with the hospitalist. We reviewed the HPI, pertinent PMH, ED course and workup. Hospitalist agreed with plan and will admit the patient to the hospital.                                 SBIRT 20yo+      Flowsheet Row Most Recent Value   Initial Alcohol Screen: US AUDIT-C     1. How often do you have a drink containing alcohol? 0 Filed at: 11/09/2023 1905   2. How many drinks containing alcohol do you have on a typical day you are drinking? 0 Filed at: 11/09/2023 1905   3a. Male UNDER 65: How often do you have five or more drinks on one occasion? 0 Filed at: 11/09/2023 1905   3b. FEMALE Any Age, or MALE 65+: How often do you have 4 or more drinks on one occassion? 0 Filed at: 11/09/2023 1905   Audit-C Score 0 Filed at: 11/09/2023 1905   ALESSANDRA: How many times in the past year have you. .. Used an illegal drug or used a prescription medication for non-medical reasons? Never Filed at: 11/09/2023 1905                      Medical Decision Making  Differential diagnosis: Fracture, dislocation, contusion of the right hip  Plan for x-rays to rule out traumatic injury. Discussed case with orthopedics who advised surgical management. I discussed the case with Ailin Jensen from [facility] who accepts patient to the service of Dr. Purnima Hdz. Amount and/or Complexity of Data Reviewed  External Data Reviewed: notes. Details: Last office note from Dr. Travis Nunez reviewed  Radiology: ordered and independent interpretation performed. Risk  OTC drugs. Decision regarding hospitalization.              Disposition  Final diagnoses:   Closed fracture of proximal end of right femur, initial encounter ([address])   History of bilateral hip replacements   Fall, initial encounter   Hypertension     Time reflects when diagnosis was documented in both MDM as applicable and the Disposition within this note       Time User Action Codes Description Comment    11/9/2023  8:20 PM Jin CARMEN Add [S72.001A] Closed fracture of proximal end of right femur, initial encounter ([address])     11/9/2023  8:20 PM Pio Santos Add [R53.066] History of bilateral hip replacements     11/9/2023  8:21 PM Pio Santos Add [X47. ZKCL] Fall, initial encounter     11/9/2023  9:42 PM Haris, [facility] Hypertension           ED Disposition       ED Disposition   Admit    Condition   Stable    Date/Time   Thu Nov 9, 2023 2048    Comment   Case was discussed with Sulaiman Hinds PA-C and the patient's admission status was agreed to be Admission Status: inpatient status to the service of Dr. Lynsey Briseno . Follow-up Information    None         Patient's Medications   Discharge Prescriptions    No medications on file       No discharge procedures on file.     PDMP Review       None            ED Provider  Electronically Signed by             Patrick Chapin DO  11/13/23 4932

## 2023-11-10 NOTE — ASSESSMENT & PLAN NOTE
Intermittent elevated BP likely due to pain  Not on antihypertensives as outpt  Optimize pain control  Hydralazine PRN

## 2023-11-11 ENCOUNTER — APPOINTMENT (INPATIENT)
Dept: RADIOLOGY | Facility: HOSPITAL | Age: 73
DRG: 467 | End: 2023-11-11
Payer: MEDICARE

## 2023-11-11 ENCOUNTER — ANESTHESIA (INPATIENT)
Dept: PERIOP | Facility: HOSPITAL | Age: 73
DRG: 467 | End: 2023-11-11
Payer: MEDICARE

## 2023-11-11 LAB
ALBUMIN SERPL BCP-MCNC: 3.7 G/DL (ref 3.5–5)
ALP SERPL-CCNC: 53 U/L (ref 34–104)
ALT SERPL W P-5'-P-CCNC: 22 U/L (ref 7–52)
ANION GAP SERPL CALCULATED.3IONS-SCNC: 7 MMOL/L
AST SERPL W P-5'-P-CCNC: 36 U/L (ref 13–39)
BILIRUB SERPL-MCNC: 1.03 MG/DL (ref 0.2–1)
BUN SERPL-MCNC: 12 MG/DL (ref 5–25)
CALCIUM SERPL-MCNC: 9.1 MG/DL (ref 8.4–10.2)
CHLORIDE SERPL-SCNC: 108 MMOL/L (ref 96–108)
CO2 SERPL-SCNC: 24 MMOL/L (ref 21–32)
CREAT SERPL-MCNC: 0.93 MG/DL (ref 0.6–1.3)
ERYTHROCYTE [DISTWIDTH] IN BLOOD BY AUTOMATED COUNT: 12.9 % (ref 11.6–15.1)
GFR SERPL CREATININE-BSD FRML MDRD: 61 ML/MIN/1.73SQ M
GLUCOSE SERPL-MCNC: 107 MG/DL (ref 65–140)
HCT VFR BLD AUTO: 41.2 % (ref 34.8–46.1)
HGB BLD-MCNC: 14.5 G/DL (ref 11.5–15.4)
MCH RBC QN AUTO: 33.4 PG (ref 26.8–34.3)
MCHC RBC AUTO-ENTMCNC: 35.2 G/DL (ref 31.4–37.4)
MCV RBC AUTO: 95 FL (ref 82–98)
PLATELET # BLD AUTO: 140 THOUSANDS/UL (ref 149–390)
PMV BLD AUTO: 11.2 FL (ref 8.9–12.7)
POTASSIUM SERPL-SCNC: 3.6 MMOL/L (ref 3.5–5.3)
PROT SERPL-MCNC: 6.7 G/DL (ref 6.4–8.4)
RBC # BLD AUTO: 4.34 MILLION/UL (ref 3.81–5.12)
SODIUM SERPL-SCNC: 139 MMOL/L (ref 135–147)
WBC # BLD AUTO: 8.63 THOUSAND/UL (ref 4.31–10.16)

## 2023-11-11 PROCEDURE — C1776 JOINT DEVICE (IMPLANTABLE): HCPCS | Performed by: STUDENT IN AN ORGANIZED HEALTH CARE EDUCATION/TRAINING PROGRAM

## 2023-11-11 PROCEDURE — 27138 REVISE HIP JOINT REPLACEMENT: CPT

## 2023-11-11 PROCEDURE — 87102 FUNGUS ISOLATION CULTURE: CPT | Performed by: STUDENT IN AN ORGANIZED HEALTH CARE EDUCATION/TRAINING PROGRAM

## 2023-11-11 PROCEDURE — NC001 PR NO CHARGE: Performed by: STUDENT IN AN ORGANIZED HEALTH CARE EDUCATION/TRAINING PROGRAM

## 2023-11-11 PROCEDURE — 87075 CULTR BACTERIA EXCEPT BLOOD: CPT | Performed by: STUDENT IN AN ORGANIZED HEALTH CARE EDUCATION/TRAINING PROGRAM

## 2023-11-11 PROCEDURE — 27138 REVISE HIP JOINT REPLACEMENT: CPT | Performed by: STUDENT IN AN ORGANIZED HEALTH CARE EDUCATION/TRAINING PROGRAM

## 2023-11-11 PROCEDURE — 0SP90JZ REMOVAL OF SYNTHETIC SUBSTITUTE FROM RIGHT HIP JOINT, OPEN APPROACH: ICD-10-PCS | Performed by: STUDENT IN AN ORGANIZED HEALTH CARE EDUCATION/TRAINING PROGRAM

## 2023-11-11 PROCEDURE — 87206 SMEAR FLUORESCENT/ACID STAI: CPT | Performed by: STUDENT IN AN ORGANIZED HEALTH CARE EDUCATION/TRAINING PROGRAM

## 2023-11-11 PROCEDURE — 72170 X-RAY EXAM OF PELVIS: CPT

## 2023-11-11 PROCEDURE — 85027 COMPLETE CBC AUTOMATED: CPT | Performed by: INTERNAL MEDICINE

## 2023-11-11 PROCEDURE — 87116 MYCOBACTERIA CULTURE: CPT | Performed by: STUDENT IN AN ORGANIZED HEALTH CARE EDUCATION/TRAINING PROGRAM

## 2023-11-11 PROCEDURE — 87070 CULTURE OTHR SPECIMN AEROBIC: CPT | Performed by: STUDENT IN AN ORGANIZED HEALTH CARE EDUCATION/TRAINING PROGRAM

## 2023-11-11 PROCEDURE — 80053 COMPREHEN METABOLIC PANEL: CPT | Performed by: INTERNAL MEDICINE

## 2023-11-11 PROCEDURE — 0SR90JZ REPLACEMENT OF RIGHT HIP JOINT WITH SYNTHETIC SUBSTITUTE, OPEN APPROACH: ICD-10-PCS | Performed by: STUDENT IN AN ORGANIZED HEALTH CARE EDUCATION/TRAINING PROGRAM

## 2023-11-11 PROCEDURE — 99232 SBSQ HOSP IP/OBS MODERATE 35: CPT | Performed by: PHYSICIAN ASSISTANT

## 2023-11-11 PROCEDURE — 87205 SMEAR GRAM STAIN: CPT | Performed by: STUDENT IN AN ORGANIZED HEALTH CARE EDUCATION/TRAINING PROGRAM

## 2023-11-11 DEVICE — CERAMIC V40 FEMORAL HEAD
Type: IMPLANTABLE DEVICE | Site: HIP | Status: FUNCTIONAL
Brand: BIOLOX

## 2023-11-11 DEVICE — MODULAR HIP SYSTEM
Type: IMPLANTABLE DEVICE | Site: HIP | Status: FUNCTIONAL
Brand: RESTORATION

## 2023-11-11 RX ORDER — FENTANYL CITRATE/PF 50 MCG/ML
25 SYRINGE (ML) INJECTION
Status: DISCONTINUED | OUTPATIENT
Start: 2023-11-11 | End: 2023-11-11 | Stop reason: HOSPADM

## 2023-11-11 RX ORDER — OXYCODONE HYDROCHLORIDE 5 MG/1
5 TABLET ORAL EVERY 6 HOURS PRN
Qty: 30 TABLET | Refills: 0 | Status: SHIPPED | OUTPATIENT
Start: 2023-11-11 | End: 2023-11-14 | Stop reason: SDUPTHER

## 2023-11-11 RX ORDER — SODIUM CHLORIDE, SODIUM LACTATE, POTASSIUM CHLORIDE, CALCIUM CHLORIDE 600; 310; 30; 20 MG/100ML; MG/100ML; MG/100ML; MG/100ML
INJECTION, SOLUTION INTRAVENOUS CONTINUOUS PRN
Status: DISCONTINUED | OUTPATIENT
Start: 2023-11-11 | End: 2023-11-11

## 2023-11-11 RX ORDER — OLANZAPINE 5 MG/1
5 TABLET, ORALLY DISINTEGRATING ORAL
Status: DISCONTINUED | OUTPATIENT
Start: 2023-11-11 | End: 2023-11-13 | Stop reason: HOSPADM

## 2023-11-11 RX ORDER — ASPIRIN 81 MG/1
81 TABLET, CHEWABLE ORAL 2 TIMES DAILY
Qty: 56 TABLET | Refills: 0 | Status: SHIPPED | OUTPATIENT
Start: 2023-11-11 | End: 2023-12-09

## 2023-11-11 RX ORDER — ALBUMIN, HUMAN INJ 5% 5 %
SOLUTION INTRAVENOUS CONTINUOUS PRN
Status: DISCONTINUED | OUTPATIENT
Start: 2023-11-11 | End: 2023-11-11

## 2023-11-11 RX ORDER — TRANEXAMIC ACID 100 MG/ML
INJECTION, SOLUTION INTRAVENOUS AS NEEDED
Status: DISCONTINUED | OUTPATIENT
Start: 2023-11-11 | End: 2023-11-11

## 2023-11-11 RX ORDER — FENTANYL CITRATE 50 UG/ML
INJECTION, SOLUTION INTRAMUSCULAR; INTRAVENOUS AS NEEDED
Status: DISCONTINUED | OUTPATIENT
Start: 2023-11-11 | End: 2023-11-11

## 2023-11-11 RX ORDER — DEXAMETHASONE SODIUM PHOSPHATE 10 MG/ML
INJECTION, SOLUTION INTRAMUSCULAR; INTRAVENOUS AS NEEDED
Status: DISCONTINUED | OUTPATIENT
Start: 2023-11-11 | End: 2023-11-11

## 2023-11-11 RX ORDER — DOXYCYCLINE HYCLATE 100 MG/1
100 CAPSULE ORAL EVERY 12 HOURS SCHEDULED
Status: DISCONTINUED | OUTPATIENT
Start: 2023-11-12 | End: 2023-11-13 | Stop reason: HOSPADM

## 2023-11-11 RX ORDER — PROPOFOL 10 MG/ML
INJECTION, EMULSION INTRAVENOUS AS NEEDED
Status: DISCONTINUED | OUTPATIENT
Start: 2023-11-11 | End: 2023-11-11

## 2023-11-11 RX ORDER — KETOROLAC TROMETHAMINE 30 MG/ML
INJECTION, SOLUTION INTRAMUSCULAR; INTRAVENOUS AS NEEDED
Status: DISCONTINUED | OUTPATIENT
Start: 2023-11-11 | End: 2023-11-11 | Stop reason: HOSPADM

## 2023-11-11 RX ORDER — CEFAZOLIN SODIUM 2 G/50ML
2000 SOLUTION INTRAVENOUS EVERY 8 HOURS
Status: COMPLETED | OUTPATIENT
Start: 2023-11-11 | End: 2023-11-12

## 2023-11-11 RX ORDER — ONDANSETRON 2 MG/ML
INJECTION INTRAMUSCULAR; INTRAVENOUS AS NEEDED
Status: DISCONTINUED | OUTPATIENT
Start: 2023-11-11 | End: 2023-11-11

## 2023-11-11 RX ORDER — DOXYCYCLINE 100 MG/1
100 TABLET ORAL 2 TIMES DAILY
Qty: 28 TABLET | Refills: 0 | Status: SHIPPED | OUTPATIENT
Start: 2023-11-11 | End: 2023-11-14 | Stop reason: SDUPTHER

## 2023-11-11 RX ORDER — HYDROMORPHONE HCL/PF 1 MG/ML
0.4 SYRINGE (ML) INJECTION
Status: DISCONTINUED | OUTPATIENT
Start: 2023-11-11 | End: 2023-11-11 | Stop reason: HOSPADM

## 2023-11-11 RX ORDER — ROCURONIUM BROMIDE 10 MG/ML
INJECTION, SOLUTION INTRAVENOUS AS NEEDED
Status: DISCONTINUED | OUTPATIENT
Start: 2023-11-11 | End: 2023-11-11

## 2023-11-11 RX ORDER — BUPIVACAINE HYDROCHLORIDE 2.5 MG/ML
INJECTION, SOLUTION EPIDURAL; INFILTRATION; INTRACAUDAL AS NEEDED
Status: DISCONTINUED | OUTPATIENT
Start: 2023-11-11 | End: 2023-11-11 | Stop reason: HOSPADM

## 2023-11-11 RX ORDER — DEXAMETHASONE SODIUM PHOSPHATE 10 MG/ML
INJECTION, SOLUTION INTRAMUSCULAR; INTRAVENOUS AS NEEDED
Status: DISCONTINUED | OUTPATIENT
Start: 2023-11-11 | End: 2023-11-11 | Stop reason: HOSPADM

## 2023-11-11 RX ORDER — ASPIRIN 81 MG/1
81 TABLET, CHEWABLE ORAL 2 TIMES DAILY
Status: DISCONTINUED | OUTPATIENT
Start: 2023-11-11 | End: 2023-11-13 | Stop reason: HOSPADM

## 2023-11-11 RX ORDER — LIDOCAINE HYDROCHLORIDE 10 MG/ML
INJECTION, SOLUTION EPIDURAL; INFILTRATION; INTRACAUDAL; PERINEURAL AS NEEDED
Status: DISCONTINUED | OUTPATIENT
Start: 2023-11-11 | End: 2023-11-11

## 2023-11-11 RX ADMIN — ASPIRIN 81 MG CHEWABLE TABLET 81 MG: 81 TABLET CHEWABLE at 20:29

## 2023-11-11 RX ADMIN — HYDROMORPHONE HYDROCHLORIDE 0.2 MG: 0.2 INJECTION, SOLUTION INTRAMUSCULAR; INTRAVENOUS; SUBCUTANEOUS at 06:10

## 2023-11-11 RX ADMIN — CEFAZOLIN SODIUM 2000 MG: 2 SOLUTION INTRAVENOUS at 08:06

## 2023-11-11 RX ADMIN — DEXAMETHASONE SODIUM PHOSPHATE 10 MG: 10 INJECTION, SOLUTION INTRAMUSCULAR; INTRAVENOUS at 09:03

## 2023-11-11 RX ADMIN — FENTANYL CITRATE 50 MCG: 50 INJECTION, SOLUTION INTRAMUSCULAR; INTRAVENOUS at 08:59

## 2023-11-11 RX ADMIN — LIDOCAINE HYDROCHLORIDE 50 MG: 10 INJECTION, SOLUTION EPIDURAL; INFILTRATION; INTRACAUDAL; PERINEURAL at 08:10

## 2023-11-11 RX ADMIN — ONDANSETRON 4 MG: 2 INJECTION INTRAMUSCULAR; INTRAVENOUS at 10:19

## 2023-11-11 RX ADMIN — FENTANYL CITRATE 50 MCG: 50 INJECTION, SOLUTION INTRAMUSCULAR; INTRAVENOUS at 08:07

## 2023-11-11 RX ADMIN — SUGAMMADEX 130 MG: 100 INJECTION, SOLUTION INTRAVENOUS at 09:26

## 2023-11-11 RX ADMIN — ONDANSETRON 4 MG: 2 INJECTION INTRAMUSCULAR; INTRAVENOUS at 09:17

## 2023-11-11 RX ADMIN — PHENYLEPHRINE HYDROCHLORIDE 40 MCG/MIN: 10 INJECTION, SOLUTION INTRAVENOUS at 09:11

## 2023-11-11 RX ADMIN — ACETAMINOPHEN 975 MG: 325 TABLET, FILM COATED ORAL at 20:29

## 2023-11-11 RX ADMIN — TRANEXAMIC ACID 1 G: 100 INJECTION INTRAVENOUS at 08:25

## 2023-11-11 RX ADMIN — FENTANYL CITRATE 50 MCG: 50 INJECTION, SOLUTION INTRAMUSCULAR; INTRAVENOUS at 09:29

## 2023-11-11 RX ADMIN — OXYCODONE HYDROCHLORIDE 5 MG: 5 TABLET ORAL at 12:02

## 2023-11-11 RX ADMIN — ACETAMINOPHEN 975 MG: 325 TABLET, FILM COATED ORAL at 15:08

## 2023-11-11 RX ADMIN — PROPOFOL 200 MG: 10 INJECTION, EMULSION INTRAVENOUS at 08:10

## 2023-11-11 RX ADMIN — CEFAZOLIN SODIUM 2000 MG: 2 SOLUTION INTRAVENOUS at 20:22

## 2023-11-11 RX ADMIN — FENTANYL CITRATE 50 MCG: 50 INJECTION, SOLUTION INTRAMUSCULAR; INTRAVENOUS at 08:10

## 2023-11-11 RX ADMIN — SODIUM CHLORIDE, SODIUM LACTATE, POTASSIUM CHLORIDE, AND CALCIUM CHLORIDE: .6; .31; .03; .02 INJECTION, SOLUTION INTRAVENOUS at 07:45

## 2023-11-11 RX ADMIN — ALBUMIN (HUMAN): 12.5 INJECTION, SOLUTION INTRAVENOUS at 08:28

## 2023-11-11 RX ADMIN — ROCURONIUM BROMIDE 50 MG: 10 INJECTION INTRAVENOUS at 08:10

## 2023-11-11 NOTE — PROGRESS NOTES
Progress Note - Orthopedics   Becca Dickens 68 y.o. female MRN: [id number]  Unit/Bed#: -01      Subjective:    68 y. o.female with a right hip periprosthetic fracture. No acute overnight events, no new complaints. Pain well-controlled at rest but increases with any attempted movement. She denies fevers, chills, headaches, CP, SOB, N/V, or numbness or tingling.      Labs:  0   Lab Value Date/Time    HCT 41.2 11/11/2023 0602    HCT 39.4 11/10/2023 0437    HCT 44.8 11/09/2023 2233    HGB 14.5 11/11/2023 0602    HGB 13.9 11/10/2023 0437    HGB 15.8 (H) 11/09/2023 2233    INR 1.07 11/09/2023 2233    WBC 8.63 11/11/2023 0602    WBC 8.50 11/10/2023 0437    WBC 13.65 (H) 11/09/2023 2233    ESR 26 (H) 03/29/2017 1404       Meds:    Current Facility-Administered Medications:     acetaminophen (TYLENOL) tablet 975 mg, 975 mg, Oral, Q8H [address], MARIIA Proctor-MARIYA, 975 mg at 11/10/23 2106    allopurinol (ZYLOPRIM) tablet 100 mg, 100 mg, Oral, Daily, MARIIA Proctor-C, 100 mg at 11/10/23 9697    ceFAZolin (ANCEF) IVPB (premix in dextrose) 2,000 mg 50 mL, 2,000 mg, Intravenous, On Call To OR, Leila Falcon PA-C    enoxaparin (LOVENOX) subcutaneous injection 40 mg, 40 mg, Subcutaneous, Daily, MARIIA Proctor-MARIYA    hydrALAZINE (APRESOLINE) injection 5 mg, 5 mg, Intravenous, Q6H PRN, Macario Oriental orthodox PA-C, 5 mg at 11/10/23 2105    HYDROmorphone HCl (DILAUDID) injection 0.2 mg, 0.2 mg, Intravenous, Q4H PRN, MARIIA Proctor-C, 0.2 mg at 11/11/23 0610    iohexol (OMNIPAQUE) 350 MG/ML injection (MULTI-DOSE) 100 mL, 100 mL, Intravenous, Once in imaging, Fitz Melendez MD    methocarbamol (ROBAXIN) tablet 500 mg, 500 mg, Oral, Q6H PRN, Earna Danielle, PA-C, 500 mg at 11/09/23 2256    nicotine (NICODERM CQ) 21 mg/24 hr TD 24 hr patch 1 patch, 1 patch, Transdermal, Daily PRN, EarMARIIA Rodríguez-C    ondansetron [facility]) injection 4 mg, 4 mg, Intravenous, Q6H PRN, Earna Danielle PA-MARIYA    oxyCODONE (ROXICODONE) split tablet 2.5 mg, 2.5 mg, Oral, Q6H PRN, 2.5 mg at 11/10/23 2046 **OR** oxyCODONE (ROXICODONE) IR tablet 5 mg, 5 mg, Oral, Q6H PRN, Sherrin Mode, PA-MARIYA    senna (SENOKOT) tablet 8.6 mg, 1 tablet, Oral, HS PRN, Sherrin Mode, MISSAEL    Tranexamic Acid-NaCl (CYKLOKAPRON) 1000-0.7 MG/100ML-% injection 1,000 mg, 1,000 mg, Intravenous, On Call To OR, Kiana Fernando PA-C    Blood Culture:   No results found for: "Zuleyma [place]"    Wound Culture:   No results found for: "WOUNDCULT"    Ins and Outs:  I/O last 24 hours: In: 1493.8 [P.O.:660; I.V.:833.8]  Out: 2000 [Urine:2000]          Physical:  Vitals:    11/11/23 0630   BP: (!) 165/117   Pulse: 100   Resp:    Temp:    SpO2: 96%     Musculoskeletal: right Lower Extremity  Visible skin intact without erythema or ecchymosis. Right leg appears shorter than the left  TTP over the hip  Sensation intact over the toes  Motor intact to +FHL/EHL, +ankle dorsi/plantar flexion  Digits warm and well perfused  Capillary refill < 2 seconds    Assessment:    68 y. o.female with a right hip periprosthetic fracture due for the OR today. Plan:   To the OR today for right hip revision periprosthetic fracture with Dr. Torie Garsia now  Hold morning blood thinners  Patient was marked this morning is preparation for surgery  Consent was previously obtained and brought to the pre-operative holding area  NWB RLE  Pain control  Medical co-morbidities are being managed per primary team  Dispo: Ortho will follow      Celi Huffman PA-C

## 2023-11-11 NOTE — OP NOTE
OPERATIVE REPORT  PATIENT NAME: Sameer Lopez  : 1950  MRN: [id number]  Pt Location:  [facility] OR ROOM 02    Surgery Date: 2023    Surgeon(s) and Role:     * Hoda Barreto DO - Primary     * Daryle Drum, MD - Observing     * Christal Loyd PA-C - Assisting     Preop Diagnosis:  Closed fracture of proximal end of right femur, initial encounter [facility]) [S72.001A]  History of bilateral hip replacements [Z96.643]    Post-Op Diagnosis Codes:     * Closed fracture of proximal end of right femur, initial encounter ([address]) [S72.001A]     * History of bilateral hip replacements [Z96.643]    Procedure(s):  Right - ARTHROPLASTY HIP TOTAL REVISION    Specimens:  ID Type Source Tests Collected by Time Destination   A : tissue culture right hip #1 Tissue Joint, Right Hip ANAEROBIC CULTURE AND GRAM STAIN, FUNGAL CULTURE, CULTURE, TISSUE AND GRAM STAIN, AFB CULTURE WITH STAIN Hoda Barreto, DO 2023 9587    B : culture tissue right hip #2 Tissue Joint, Right Hip FUNGAL CULTURE, STAT GRAM STAIN, CULTURE, TISSUE AND GRAM STAIN, AFB CULTURE WITH STAIN Hoda Barreto, DO 2023 0853    C : tissue culture right hip #3 Tissue Joint, Right Hip FUNGAL CULTURE, STAT GRAM STAIN, CULTURE, TISSUE AND GRAM STAIN, AFB CULTURE WITH STAIN Hoda Barreto, DO 2023 0854        Estimated Blood Loss:   200 cc    Drains:  * No LDAs found *    Anesthesia Type:   GETA    Operative Indications:  Closed fracture of proximal end of right femur, initial encounter ([address]) [S72.001A]  History of bilateral hip replacements [Z96.643]  Patient is a 71-year-old female who had a mechanical fall onto her right hip. Patient had immediate pain in her right hip and was unable to ambulate after the fall. Patient is status post right total hip arthroplasty by Dr. Danielle Castillo approximately 10 years ago.   She was brought to the emergency department and found to have a [place] B2 periprosthetic femur fracture on x-ray and CAT scan. On exam patient has pain localized to the right hip. Pain is exacerbated with any movement. She is unable to ambulate on the right lower extremity. She is neurovascularly intact bilaterally. Her incisions are well-healed without any history of infection. We discussed that the patient's Accolade stem has subsided and she has a intertrochanteric fracture around it. Due to the loose femoral stem she will require revision to cone conical construct. We discussed possible proximal femoral ORIF pending her bone stability. Risks and benefits were discussed with the patient to include but not limited to bleeding, infection, damage surrounding structures, dislocation, fracture, continued pain, nonunion, greater trochanteric escape, continued pain, leg length inequality, blood clots, stroke heart attack and death. To note patient is a approximately 2 pack a day smoker and it was discussed that her excessive nicotine use will slow healing and increases her chance of infection. Patient has elected to proceed to the operating room for revision total hip arthroplasty and possible ORIF. Operative Findings:  [place] B2 periprosthetic femur fracture with subsidence and greater trochanter fracture  GT fx remains digastric sleeve with abductors and vastus attached to bone fragment  Well-fixed acetabular component without poly wear     Implant Name Type Inv. Item Serial No.  Lot No. LRB No. Used Action   STEM FEM 17 X 115MM CONICAL DSTL RESTORATION - [id number]  STEM FEM 17 X 115MM CONICAL DSTL RESTORATION  [employer] [id number] Right 1 Implanted   Restoration Modular Hip Sysytem     [id number] Right 1 Implanted   HEAD CERAMIC V-40 DELTA 32 - [id number]  HEAD CERAMIC V-40 DELTA 32  [employer] [id number] Right 1 Implanted         Complications:   None      Hip Approach: Direct lateral    Procedure and Technique:  The patient seen in the preoperative area. The informed consent was confirmed. The operative site was confirmed and marked. Patient was taken to the operating room and anesthesia was performed by the anesthesiologist.  Once anesthesia was complete the patient was placed in the lateral decubitus position with the operative hip up held in place with stulberg hip positioners. All bony prominences were well-padded. The right lower extremity was prepped and draped in typical sterile fashion. Perioperative antibiotics were given per scip protocol prior to incision. A formal timeout was performed identifying the patient and surgical site. The patient's previous lateral scar was resected down to the level of the fascia. The ITB band was split in line with the greater trochanter. At this time a [place] retractor was placed being careful to avoid neurovascular structures. A modified Noelle [place] approach was used taking down the anterior 1/3 of the gluteus minimus and medius reflecting it anterior off the capsule. Retractors were then carefully placed around the capsule. The anterior capsule was resected sharply. We encountered a large hematoma from her fracture. Her greater trochanter was completely fractured but soft tissue attachments of the abductors and vastus lateralis were intact allowing it to act as a digastric sleeve. At this time we dislocated the hip anteriorly. The 32+0 cobalt chrome femoral head was removed from the trunnion. At this time we evaluated the femoral stem which was found to be loose as expected. We cleared the shoulder of the implant of soft tissue and bony overgrowth and the stem was removed without complication. At this time we evaluated the acetabular component which was found to be well fixed and polyethylene was evaluated and found to be free of wear. At this time 3 soft tissue samples were taken from around the hip and sent for culture. At this time the canal finder was used followed by reaming for the restoration short modular cone conical stem.   We reamed up to a size 17 with excellent chatter and bone and the fluids for a 115 mm distal stem. At this time we impacted the 17 x 115 mm restoration modular cone conical distal stem into the femur. At this time we proximal reamed for the proximal body up to a size 23 mm. There was minimal reaming as the trochanter is fractured. At this time we trialed the hip with a +0 23 mm proximal body and +0 32 mm trial head with excellent stability. The hip was stable and external rotation and abduction along with internal rotation and flexion with appropriate leg length and tension in the lateral decubitus position. At this time the hip was dislocated. The trial head and trial proximal body were removed. We next impacted the +0 23 mm proximal body and appropriate anteversion. We then torqued the setscrew to specification. We trialed the head again with a +0 femoral head with excellent stability. We next removed the trial and impacted the 32+0 ceramic femoral head on a clean dry trunnion. The hip was then taken through range of motion and found have excellent stability. We next evaluated the greater trochanteric fragment. Due to soft tissue attachments of the abductor musculature and vastus lateralis remaining intact the greater trochanter sat against the prosthesis in anatomic position. We decided to not use a trochanteric claw plate as the trochanteric fracture was stable and this would avoid hardware prominence and irritation. We secured the fragment to the proximal body with suture fixation. The hip was irrigated with Irrisept solution followed by normal saline solution. The abductors were at this time repaired with #1 vicryl suture to the greater trochanter through figure-of-eight sutures and bone tunnels. This was then reinforced with running #1 Stratafix. The ITB band was closed with 1. Stratafix. The subcutaneous tissues closed with interrupted 2-0 Vicryl.   The skin was closed with 3-0 Stratafix suture followed by exofin. All instrument counts and sponge counts were correct. Patient was awoken from anesthesia in stable condition when taken to the recovery room. I was present for the entire procedure, A qualified resident physician was not available and A physician assistant was required during the procedure for retraction tissue handling,dissection and suturing    Patient Disposition:  PACU      73F s/p R M[facility]SSA [facility] B2 periprosthetic fracture  - multi-modal pain control  - periop abx x24 hrs, doxy x 2 weeks pending cultures   - DVT ppx: aspirin 81mg BID x 4 weeks   - PT/OT  - WBAT  - abductor precautions (no resisted abduction, no passive adduction.    - f/u 10-14 days         SIGNATURE: Connie Biggs DO  DATE: November 11, 2023  TIME: 3:28 PM

## 2023-11-11 NOTE — PLAN OF CARE
Problem: MOBILITY - ADULT  Goal: Maintain or return to baseline ADL function  Description: INTERVENTIONS:  -  Assess patient's ability to carry out ADLs; assess patient's baseline for ADL function and identify physical deficits which impact ability to perform ADLs (bathing, care of mouth/teeth, toileting, grooming, dressing, etc.)  - Assess/evaluate cause of self-care deficits   - Assess range of motion  - Assess patient's mobility; develop plan if impaired  - Assess patient's need for assistive devices and provide as appropriate  - Encourage maximum independence but intervene and supervise when necessary  - Involve family in performance of ADLs  - Assess for home care needs following discharge   - Consider OT consult to assist with ADL evaluation and planning for discharge  - Provide patient education as appropriate  Outcome: Progressing  Goal: Maintains/Returns to pre admission functional level  Description: INTERVENTIONS:  - Perform BMAT or MOVE assessment daily.   - Set and communicate daily mobility goal to care team and patient/family/caregiver. - Collaborate with rehabilitation services on mobility goals if consulted  - Perform Range of Motion 3 times a day. - Reposition patient every 2 hours.   - Dangle patient 3 times a day  - Stand patient 3 times a day  - Ambulate patient 3 times a day  - Out of bed to chair 3 times a day   - Out of bed for meals 3 times a day  - Out of bed for toileting  - Record patient progress and toleration of activity level   Outcome: Progressing     Problem: PAIN - ADULT  Goal: Verbalizes/displays adequate comfort level or baseline comfort level  Description: Interventions:  - Encourage patient to monitor pain and request assistance  - Assess pain using appropriate pain scale  - Administer analgesics based on type and severity of pain and evaluate response  - Implement non-pharmacological measures as appropriate and evaluate response  - Consider cultural and social influences on pain and pain management  - Notify physician/advanced practitioner if interventions unsuccessful or patient reports new pain  Outcome: Progressing     Problem: INFECTION - ADULT  Goal: Absence or prevention of progression during hospitalization  Description: INTERVENTIONS:  - Assess and monitor for signs and symptoms of infection  - Monitor lab/diagnostic results  - Monitor all insertion sites, i.e. indwelling lines, tubes, and drains  - Monitor endotracheal if appropriate and nasal secretions for changes in amount and color  - [place] appropriate cooling/warming therapies per order  - Administer medications as ordered  - Instruct and encourage patient and family to use good hand hygiene technique  - Identify and instruct in appropriate isolation precautions for identified infection/condition  Outcome: Progressing  Goal: Absence of fever/infection during neutropenic period  Description: INTERVENTIONS:  - Monitor WBC    Outcome: Progressing     Problem: SAFETY ADULT  Goal: Maintain or return to baseline ADL function  Description: INTERVENTIONS:  -  Assess patient's ability to carry out ADLs; assess patient's baseline for ADL function and identify physical deficits which impact ability to perform ADLs (bathing, care of mouth/teeth, toileting, grooming, dressing, etc.)  - Assess/evaluate cause of self-care deficits   - Assess range of motion  - Assess patient's mobility; develop plan if impaired  - Assess patient's need for assistive devices and provide as appropriate  - Encourage maximum independence but intervene and supervise when necessary  - Involve family in performance of ADLs  - Assess for home care needs following discharge   - Consider OT consult to assist with ADL evaluation and planning for discharge  - Provide patient education as appropriate  Outcome: Progressing  Goal: Maintains/Returns to pre admission functional level  Description: INTERVENTIONS:  - Perform BMAT or MOVE assessment daily.   - Set and communicate daily mobility goal to care team and patient/family/caregiver. - Collaborate with rehabilitation services on mobility goals if consulted  - Perform Range of Motion 3 times a day. - Reposition patient every 2 hours.   - Dangle patient 3 times a day  - Stand patient 3 times a day  - Ambulate patient 3 times a day  - Out of bed to chair 3 times a day   - Out of bed for meals 3 times a day  - Out of bed for toileting  - Record patient progress and toleration of activity level   Outcome: Progressing  Goal: Patient will remain free of falls  Description: INTERVENTIONS:  - Educate patient/family on patient safety including physical limitations  - Instruct patient to call for assistance with activity   - Consult OT/PT to assist with strengthening/mobility   - Keep Call bell within reach  - Keep bed low and locked with side rails adjusted as appropriate  - Keep care items and personal belongings within reach  - Initiate and maintain comfort rounds  - Make Fall Risk Sign visible to staff  - Offer Toileting every 2 Hours, in advance of need  - Initiate/Maintain bed alarm  - Obtain necessary fall risk management equipment  - Apply yellow socks and bracelet for high fall risk patients  - Consider moving patient to room near nurses station  Outcome: Progressing     Problem: DISCHARGE PLANNING  Goal: Discharge to home or other facility with appropriate resources  Description: INTERVENTIONS:  - Identify barriers to discharge w/patient and caregiver  - Arrange for needed discharge resources and transportation as appropriate  - Identify discharge learning needs (meds, wound care, etc.)  - Arrange for interpretive services to assist at discharge as needed  - Refer to Case Management Department for coordinating discharge planning if the patient needs post-hospital services based on physician/advanced practitioner order or complex needs related to functional status, cognitive ability, or social support system  Outcome: Progressing     Problem: Knowledge Deficit  Goal: Patient/family/caregiver demonstrates understanding of disease process, treatment plan, medications, and discharge instructions  Description: Complete learning assessment and assess knowledge base.   Interventions:  - Provide teaching at level of understanding  - Provide teaching via preferred learning methods  Outcome: Progressing

## 2023-11-11 NOTE — ANESTHESIA POSTPROCEDURE EVALUATION
Post-Op Assessment Note    CV Status:  Stable  Pain Score: 3    Pain management: adequate     Mental Status:  Awake and confused   Hydration Status:  Euvolemic   PONV Controlled:  Controlled   Airway Patency:  Patent      Post Op Vitals Reviewed: Yes      Staff: CRNA         No notable events documented.     BP   123/84   Temp      Pulse  92   Resp   22   SpO2   96 on 3LNC

## 2023-11-11 NOTE — DISCHARGE INSTR - AVS FIRST PAGE
Dr. Bang Randolph Hip Replacement    What to Expect/Activity  You are weight bearing as tolerated to your operative leg unless otherwise instructed. Use your walker or crutches. It is important to get up and walk for 10 minutes every hour, if possible. Initial recovery therapy is focused on walking. If in your follow-up a referral to formal physical therapy is required, this will be provided based on your recovery. You may sleep however you would like. Many patients feel more comfortable with a pillow between their legs and find it uncomfortably to lay on the operative side. If you do roll on that side at night you will not damage the replacement. You may use a regular or elevated toilet seat, whichever is more comfortable. We do not routinely require any specific precautions in terms of positioning of your leg and if so this will be taught to you by the physical therapists at the hospital. This means that you can dress as you are comfortable, including shoes and socks. You can bend down carefully to get items from the floor. Swelling and discomfort causes pain. Elevate your surgical leg on 1-2 pillows placed behind your ankle/calf throughout the day, especially when you are laying down. Please use ice packs for 20-30 minutes every 1-2 hours for 48-72 hours on the operative hip. Please make sure there is a barrier directly between your skin and your ice/ice pack. Please use incentive spirometer 10 times per hour while awake (see diagram below). Dressing/Wound Care/Bathing  There is a surgical dressing over your incision that stays in place until follow-up unless it starts to peel off. You may start showering 24 hours after surgery, the surgical dressing will remain in place. Please pat the dressing dry. If you notice the dressing appears saturated or is starting to come off, please replace with a dry dressing. Keep the dressing on until your follow-up in the office.    There may be dried blood around the incision. It is ok to continue showering after removing the dressing but do not scrub the incision. Pat incision dry. Do not place any creams, ointments or gels on or around the incision. No baths, swimming or submerging until cleared by Dr. Jenni Delaney. Pain Management/Medications  You may resume your usual medications. Please take the following medications:  Anti-coagulation (blood clot prevention) - aspirin 81 mg twice a day for 4 weeks  Pain medication:  Narcotic Medication: Take as directed  NSAID (Anti-inflammatory): Take as directed  Tylenol 1000mg every 8 hours  Zofran (ondasetron) - 4mg every 8 hours as needed for nausea  Stool Softeners (senna/colace)- take daily to help prevent constipation as narcotic pain medication causes constipation  Antibiotic - take as directed if prescribed  If you have questions or pain concerns, please contact the office. Pain medication cannot remove all post-operative pain. Follow up/Call if:  The findings of your surgery will be explained to you and your family immediately after surgery. However, in the post-operative period, during recovery from anesthesia you may not fully remember or fully understand what was said. We will go over this again when you return for your post-op appointment.   Please contact Dr. Mahamed Jacome office if you experience the following:  Excessive bleeding (bleeding through your dressing)  Fever greater than 101 degrees F after the first 2 days (a slight fever is normal the first few days after surgery)  Persistent nausea or vomiting  Decreased sensation or discoloration of the operative limb  Pain or swelling that is getting worse and not better with medication    Dr. No Campos: [phone]

## 2023-11-11 NOTE — OCCUPATIONAL THERAPY NOTE
Occupational Therapy Cx Note     Patient Name: Maritza Schaffer  CNXTA'O Date: 11/11/2023  Problem List  Principal Problem:    Fracture of neck of right femur [facility])  Active Problems:    Benign essential hypertension    Gout    Urine retention            11/11/23 0925   OT Last Visit   OT Visit Date 11/11/23   Note Type   Note type Cancelled Session   Cancel Reasons Patient to operating room   Additional Comments OT orders received. Pt presents to [facility] with right hip periprosthetic fracture. Pt currently in OR. Will hold and follow as able and appropriate.          Jean Ames, OTR/L

## 2023-11-11 NOTE — PROGRESS NOTES
[address] [facility]  Progress Note  Name: Nghia Scales  MRN: [id number]  Unit/Bed#: -01 I Date of Admission: 11/9/2023   Date of Service: 11/11/2023 I Hospital Day: 2    Assessment/Plan   * Fracture of neck of right femur [facility])  Assessment & Plan  S/p mechanical fall on to right side with resultant right femoral neck fracture surrounding present hardware  CT LE showed status post total right hip arthroplasty with a proximal right femoral diaphysis mildly displaced periprosthetic fracture. POD 0 for right hip revision periprosthetic fracture  Continue pain control  PT/OT eval      Urine retention  Assessment & Plan  Straight cath once on admit, had 600 ml   Continue urinary retention protocol    Gout  Assessment & Plan  Hx of gout - maintained of allopurinol - continue     Benign essential hypertension  Assessment & Plan  Intermittent elevated BP likely due to pain  Not on antihypertensives as outpt  Optimize pain control  Hydralazine PRN         VTE Pharmacologic Prophylaxis: VTE Score: 7 High Risk (Score >/= 5) - Pharmacological DVT Prophylaxis Ordered: ASA per ortho. Sequential Compression Devices Ordered. Patient Centered Rounds: I performed bedside rounds with nursing staff today. Discussions with Specialists or Other Care Team Provider: CM    Education and Discussions with Family / Patient: Updated  (daughter) at bedside. Total Time Spent on Date of Encounter in care of patient: 40 mins. This time was spent on one or more of the following: performing physical exam; counseling and coordination of care; obtaining or reviewing history; documenting in the medical record; reviewing/ordering tests, medications or procedures; communicating with other healthcare professionals and discussing with patient's family/caregivers.     Current Length of Stay: 2 day(s)  Current Patient Status: Inpatient   Certification Statement: The patient will continue to require additional inpatient hospital stay due to PT/OT eval, monitoring post-op  Discharge Plan: Anticipate discharge in 48 hrs to discharge location to be determined pending rehab evaluations. Code Status: Level 1 - Full Code    Subjective:   Patient evaluated post-op. Reporting right hip pain. Denies chest pain/palpitations, shortness of breath, nausea/vomiting. Objective:     Vitals:   Temp (24hrs), Av.8 °F (36.6 °C), Min:96.8 °F (36 °C), Max:99.3 °F (37.4 °C)    Temp:  [96.8 °F (36 °C)-99.3 °F (37.4 °C)] 97.4 °F (36.3 °C)  HR:  [] 76  Resp:  [16-22] 22  BP: (128-183)/() 135/89  SpO2:  [89 %-97 %] 96 %  Body mass index is 22.8 kg/m². Input and Output Summary (last 24 hours): Intake/Output Summary (Last 24 hours) at 2023 1245  Last data filed at 2023 0942  Gross per 24 hour   Intake 1730 ml   Output 1350 ml   Net 380 ml       Physical Exam:   Physical Exam  Vitals and nursing note reviewed. Constitutional:       Appearance: Normal appearance. Comments: No acute distress   HENT:      Head: Normocephalic. Eyes:      General: No scleral icterus. Extraocular Movements: Extraocular movements intact. Conjunctiva/sclera: Conjunctivae normal.   Cardiovascular:      Rate and Rhythm: Normal rate and regular rhythm. Heart sounds: S1 normal and S2 normal.   Pulmonary:      Effort: Pulmonary effort is normal.      Breath sounds: Normal breath sounds. No wheezing, rhonchi or rales. Abdominal:      General: Bowel sounds are normal.      Palpations: Abdomen is soft. Tenderness: There is no abdominal tenderness. There is no guarding or rebound. Musculoskeletal:         General: No swelling, tenderness or deformity. Cervical back: Normal range of motion. Comments: Moving upper/lower ext bilaterally no edema   Skin:     General: Skin is warm and dry. Neurological:      General: No focal deficit present. Mental Status: She is alert.    Psychiatric: Mood and Affect: Mood is anxious. Additional Data:     Labs:  Results from last 7 days   Lab Units 11/11/23  0602 11/10/23  0437 11/09/23  2233   WBC Thousand/uL 8.63   < > 13.65*   HEMOGLOBIN g/dL 14.5   < > 15.8*   HEMATOCRIT % 41.2   < > 44.8   PLATELETS Thousands/uL 140*   < > 165   NEUTROS PCT %  --   --  72   LYMPHS PCT %  --   --  20   MONOS PCT %  --   --  7   EOS PCT %  --   --  0    < > = values in this interval not displayed. Results from last 7 days   Lab Units 11/11/23  0602   SODIUM mmol/L 139   POTASSIUM mmol/L 3.6   CHLORIDE mmol/L 108   CO2 mmol/L 24   BUN mg/dL 12   CREATININE mg/dL 0.93   ANION GAP mmol/L 7   CALCIUM mg/dL 9.1   ALBUMIN g/dL 3.7   TOTAL BILIRUBIN mg/dL 1.03*   ALK PHOS U/L 53   ALT U/L 22   AST U/L 36   GLUCOSE RANDOM mg/dL 107     Results from last 7 days   Lab Units 11/09/23  2233   INR  1.07                   Lines/Drains:  Invasive Devices       Peripheral Intravenous Line  Duration             Peripheral IV 11/09/23 Distal;Dorsal (posterior); Right Forearm 1 day    Peripheral IV 11/11/23 Left Hand <1 day                          Imaging: Reviewed radiology reports from this admission including: CT RLE    Recent Cultures (last 7 days):         Last 24 Hours Medication List:   Current Facility-Administered Medications   Medication Dose Route Frequency Provider Last Rate    acetaminophen  975 mg Oral [facility] Baylee Donohue PA-C      allopurinol  100 mg Oral Daily Baylee Donohue PA-C      aspirin  81 mg Oral BID Jaswant BrainMISSAEL      cefazolin  2,000 mg Intravenous Q8H Baylee Donohue PA-C      [START ON 11/12/2023] doxycycline hyclate  100 mg Oral Q12H [facility] Baylee Donohue PA-C      hydrALAZINE  5 mg Intravenous Q6H PRN Jaswant BrainMISSAEL      HYDROmorphone  0.2 mg Intravenous Q4H PRN Baylee Donohue PA-C      iohexol  100 mL Intravenous Once in imaging Jaswant Brain, MISSAEL      methocarbamol  500 mg Oral Q6H PRN Jaswant Brain, MISSAEL nicotine  1 patch Transdermal Daily PRN Chiquis Looney PA-C      ondansetron  4 mg Intravenous Q6H PRN Chiquis Looney PA-C      oxyCODONE  2.5 mg Oral Q6H PRN Chiquis Looney PA-C      Or    oxyCODONE  5 mg Oral Q6H PRN Chiquis Looney PA-C      senna  1 tablet Oral HS PRN Chiquis Looney PA-C      tranexamic acid  1,000 mg Intravenous On Call To OR Chiquis Looney PA-C          Today, Patient Was Seen By: Yeimy Antonio PA-C    **Please Note: This note may have been constructed using a voice recognition system. **

## 2023-11-11 NOTE — PLAN OF CARE
Problem: MOBILITY - ADULT  Goal: Maintain or return to baseline ADL function  Description: INTERVENTIONS:  -  Assess patient's ability to carry out ADLs; assess patient's baseline for ADL function and identify physical deficits which impact ability to perform ADLs (bathing, care of mouth/teeth, toileting, grooming, dressing, etc.)  - Assess/evaluate cause of self-care deficits   - Assess range of motion  - Assess patient's mobility; develop plan if impaired  - Assess patient's need for assistive devices and provide as appropriate  - Encourage maximum independence but intervene and supervise when necessary  - Involve family in performance of ADLs  - Assess for home care needs following discharge   - Consider OT consult to assist with ADL evaluation and planning for discharge  - Provide patient education as appropriate  Outcome: Progressing  Goal: Maintains/Returns to pre admission functional level  Description: INTERVENTIONS:  - Perform BMAT or MOVE assessment daily.   - Set and communicate daily mobility goal to care team and patient/family/caregiver. - Collaborate with rehabilitation services on mobility goals if consulted  - Perform Range of Motion xxxx times a day. - Reposition patient every xx hours.   - Dangle patient x times a day  - Stand patient x times a day  - Ambulate patient x times a day  - Out of bed to chair x times a day   - Out of bed for meals x times a day  - Out of bed for toileting  - Record patient progress and toleration of activity level   Outcome: Progressing     Problem: PAIN - ADULT  Goal: Verbalizes/displays adequate comfort level or baseline comfort level  Description: Interventions:  - Encourage patient to monitor pain and request assistance  - Assess pain using appropriate pain scale  - Administer analgesics based on type and severity of pain and evaluate response  - Implement non-pharmacological measures as appropriate and evaluate response  - Consider cultural and social influences on pain and pain management  - Notify physician/advanced practitioner if interventions unsuccessful or patient reports new pain  Outcome: Progressing     Problem: INFECTION - ADULT  Goal: Absence or prevention of progression during hospitalization  Description: INTERVENTIONS:  - Assess and monitor for signs and symptoms of infection  - Monitor lab/diagnostic results  - Monitor all insertion sites, i.e. indwelling lines, tubes, and drains  - Monitor endotracheal if appropriate and nasal secretions for changes in amount and color  - [place] appropriate cooling/warming therapies per order  - Administer medications as ordered  - Instruct and encourage patient and family to use good hand hygiene technique  - Identify and instruct in appropriate isolation precautions for identified infection/condition  Outcome: Progressing  Goal: Absence of fever/infection during neutropenic period  Description: INTERVENTIONS:  - Monitor WBC    Outcome: Progressing     Problem: SAFETY ADULT  Goal: Maintain or return to baseline ADL function  Description: INTERVENTIONS:  -  Assess patient's ability to carry out ADLs; assess patient's baseline for ADL function and identify physical deficits which impact ability to perform ADLs (bathing, care of mouth/teeth, toileting, grooming, dressing, etc.)  - Assess/evaluate cause of self-care deficits   - Assess range of motion  - Assess patient's mobility; develop plan if impaired  - Assess patient's need for assistive devices and provide as appropriate  - Encourage maximum independence but intervene and supervise when necessary  - Involve family in performance of ADLs  - Assess for home care needs following discharge   - Consider OT consult to assist with ADL evaluation and planning for discharge  - Provide patient education as appropriate  Outcome: Progressing  Goal: Maintains/Returns to pre admission functional level  Description: INTERVENTIONS:  - Perform BMAT or MOVE assessment daily.   - Set and communicate daily mobility goal to care team and patient/family/caregiver. - Collaborate with rehabilitation services on mobility goals if consulted  - Perform Range of Motion x times a day. - Reposition patient every x hours.   - Dangle patient x times a day  - Stand patient x times a day  - Ambulate patient x times a day  - Out of bed to chair x times a day   - Out of bed for meals x times a day  - Out of bed for toileting  - Record patient progress and toleration of activity level   Outcome: Progressing  Goal: Patient will remain free of falls  Description: INTERVENTIONS:  - Educate patient/family on patient safety including physical limitations  - Instruct patient to call for assistance with activity   - Consult OT/PT to assist with strengthening/mobility   - Keep Call bell within reach  - Keep bed low and locked with side rails adjusted as appropriate  - Keep care items and personal belongings within reach  - Initiate and maintain comfort rounds  - Make Fall Risk Sign visible to staff  - Offer Toileting every x Hours, in advance of need  - Initiate/Maintain xalarm  - Obtain necessary fall risk management equipment: x  - Apply yellow socks and bracelet for high fall risk patients  - Consider moving patient to room near nurses station  Outcome: Progressing     Problem: DISCHARGE PLANNING  Goal: Discharge to home or other facility with appropriate resources  Description: INTERVENTIONS:  - Identify barriers to discharge w/patient and caregiver  - Arrange for needed discharge resources and transportation as appropriate  - Identify discharge learning needs (meds, wound care, etc.)  - Arrange for interpretive services to assist at discharge as needed  - Refer to Case Management Department for coordinating discharge planning if the patient needs post-hospital services based on physician/advanced practitioner order or complex needs related to functional status, cognitive ability, or social support system  Outcome: Progressing     Problem: Knowledge Deficit  Goal: Patient/family/caregiver demonstrates understanding of disease process, treatment plan, medications, and discharge instructions  Description: Complete learning assessment and assess knowledge base.   Interventions:  - Provide teaching at level of understanding  - Provide teaching via preferred learning methods  Outcome: Progressing     Problem: Prexisting or High Potential for Compromised Skin Integrity  Goal: Skin integrity is maintained or improved  Description: INTERVENTIONS:  - Identify patients at risk for skin breakdown  - Assess and monitor skin integrity  - Assess and monitor nutrition and hydration status  - Monitor labs   - Assess for incontinence   - Turn and reposition patient  - Assist with mobility/ambulation  - Relieve pressure over bony prominences  - Avoid friction and shearing  - Provide appropriate hygiene as needed including keeping skin clean and dry  - Evaluate need for skin moisturizer/barrier cream  - Collaborate with interdisciplinary team   - Patient/family teaching  - Consider wound care consult   Outcome: Progressing

## 2023-11-12 PROBLEM — D62 ACUTE BLOOD LOSS ANEMIA: Status: ACTIVE | Noted: 2023-11-12

## 2023-11-12 PROBLEM — R65.10 SIRS (SYSTEMIC INFLAMMATORY RESPONSE SYNDROME) (HCC): Status: ACTIVE | Noted: 2023-11-12

## 2023-11-12 LAB
ANION GAP SERPL CALCULATED.3IONS-SCNC: 6 MMOL/L
BASOPHILS # BLD MANUAL: 0 THOUSAND/UL (ref 0–0.1)
BASOPHILS NFR MAR MANUAL: 0 % (ref 0–1)
BUN SERPL-MCNC: 16 MG/DL (ref 5–25)
CALCIUM SERPL-MCNC: 8.5 MG/DL (ref 8.4–10.2)
CHLORIDE SERPL-SCNC: 106 MMOL/L (ref 96–108)
CO2 SERPL-SCNC: 24 MMOL/L (ref 21–32)
CREAT SERPL-MCNC: 1.02 MG/DL (ref 0.6–1.3)
EOSINOPHIL # BLD MANUAL: 0.13 THOUSAND/UL (ref 0–0.4)
EOSINOPHIL NFR BLD MANUAL: 1 % (ref 0–6)
ERYTHROCYTE [DISTWIDTH] IN BLOOD BY AUTOMATED COUNT: 13.1 % (ref 11.6–15.1)
GFR SERPL CREATININE-BSD FRML MDRD: 55 ML/MIN/1.73SQ M
GLUCOSE SERPL-MCNC: 124 MG/DL (ref 65–140)
HCT VFR BLD AUTO: 29 % (ref 34.8–46.1)
HGB BLD-MCNC: 10.2 G/DL (ref 11.5–15.4)
LYMPHOCYTES # BLD AUTO: 18 % (ref 14–44)
LYMPHOCYTES # BLD AUTO: 2.26 THOUSAND/UL (ref 0.6–4.47)
MCH RBC QN AUTO: 34 PG (ref 26.8–34.3)
MCHC RBC AUTO-ENTMCNC: 35.2 G/DL (ref 31.4–37.4)
MCV RBC AUTO: 97 FL (ref 82–98)
MONOCYTES # BLD AUTO: 1.5 THOUSAND/UL (ref 0–1.22)
MONOCYTES NFR BLD: 12 % (ref 4–12)
NEUTROPHILS # BLD MANUAL: 8.65 THOUSAND/UL (ref 1.85–7.62)
NEUTS BAND NFR BLD MANUAL: 2 % (ref 0–8)
NEUTS SEG NFR BLD AUTO: 67 % (ref 43–75)
PLATELET # BLD AUTO: 144 THOUSANDS/UL (ref 149–390)
PLATELET BLD QL SMEAR: ABNORMAL
PMV BLD AUTO: 11.9 FL (ref 8.9–12.7)
POTASSIUM SERPL-SCNC: 3.9 MMOL/L (ref 3.5–5.3)
RBC # BLD AUTO: 3 MILLION/UL (ref 3.81–5.12)
SODIUM SERPL-SCNC: 136 MMOL/L (ref 135–147)
WBC # BLD AUTO: 12.53 THOUSAND/UL (ref 4.31–10.16)

## 2023-11-12 PROCEDURE — 99232 SBSQ HOSP IP/OBS MODERATE 35: CPT | Performed by: PHYSICIAN ASSISTANT

## 2023-11-12 PROCEDURE — 85007 BL SMEAR W/DIFF WBC COUNT: CPT | Performed by: PHYSICIAN ASSISTANT

## 2023-11-12 PROCEDURE — 85027 COMPLETE CBC AUTOMATED: CPT | Performed by: PHYSICIAN ASSISTANT

## 2023-11-12 PROCEDURE — 99024 POSTOP FOLLOW-UP VISIT: CPT

## 2023-11-12 PROCEDURE — 80048 BASIC METABOLIC PNL TOTAL CA: CPT | Performed by: PHYSICIAN ASSISTANT

## 2023-11-12 RX ADMIN — CEFAZOLIN SODIUM 2000 MG: 2 SOLUTION INTRAVENOUS at 05:20

## 2023-11-12 RX ADMIN — ASPIRIN 81 MG CHEWABLE TABLET 81 MG: 81 TABLET CHEWABLE at 20:58

## 2023-11-12 RX ADMIN — OXYCODONE HYDROCHLORIDE 5 MG: 5 TABLET ORAL at 09:59

## 2023-11-12 RX ADMIN — ALLOPURINOL 100 MG: 100 TABLET ORAL at 09:59

## 2023-11-12 RX ADMIN — ASPIRIN 81 MG CHEWABLE TABLET 81 MG: 81 TABLET CHEWABLE at 09:59

## 2023-11-12 RX ADMIN — ACETAMINOPHEN 975 MG: 325 TABLET, FILM COATED ORAL at 21:01

## 2023-11-12 RX ADMIN — OXYCODONE HYDROCHLORIDE 5 MG: 5 TABLET ORAL at 17:02

## 2023-11-12 RX ADMIN — DOXYCYCLINE 100 MG: 100 CAPSULE ORAL at 20:58

## 2023-11-12 RX ADMIN — ACETAMINOPHEN 975 MG: 325 TABLET, FILM COATED ORAL at 05:20

## 2023-11-12 NOTE — ASSESSMENT & PLAN NOTE
Patient met SIRS criteria with HR > 90, leukocytosis of 12k  Elevated heart rate likely secondary to pain. Leukocytosis reactive due to recent surgery  Afebrile.   Denies any urinary complaints  Trend WBC and fever curve

## 2023-11-12 NOTE — PROGRESS NOTES
Progress Note - Orthopedics   Newfield Givens 68 y.o. female MRN: [id number]  Unit/Bed#: -01      Subjective:    68 y. o.female POD 1 right hip revision. No acute overnight events, no new complaints. Pain well-controlled at rest, but increases with walking and movement. She denies fevers, chills, headaches, CP, SOB, N/V, or numbness or tingling.      Labs:  0   Lab Value Date/Time    HCT 29.0 (L) 11/12/2023 0518    HCT 41.2 11/11/2023 0602    HCT 39.4 11/10/2023 0437    HGB 10.2 (L) 11/12/2023 0518    HGB 14.5 11/11/2023 0602    HGB 13.9 11/10/2023 0437    INR 1.07 11/09/2023 2233    WBC 12.53 (H) 11/12/2023 0518    WBC 8.63 11/11/2023 0602    WBC 8.50 11/10/2023 0437    ESR 26 (H) 03/29/2017 1404       Meds:    Current Facility-Administered Medications:     acetaminophen (TYLENOL) tablet 975 mg, 975 mg, Oral, Q8H [address], Baylee Donohue PA-C, 975 mg at 11/12/23 0520    allopurinol (ZYLOPRIM) tablet 100 mg, 100 mg, Oral, Daily, Baylee Donohue PA-C, 100 mg at 11/12/23 1130    aspirin chewable tablet 81 mg, 81 mg, Oral, BID, Baylee Donohue PA-C, 81 mg at 11/12/23 0959    doxycycline hyclate (VIBRAMYCIN) capsule 100 mg, 100 mg, Oral, Q12H [address], Baylee Donohue PA-C    hydrALAZINE (APRESOLINE) injection 5 mg, 5 mg, Intravenous, Q6H PRN, MARIIA Escalante-MARIYA, 5 mg at 11/10/23 2105    HYDROmorphone HCl (DILAUDID) injection 0.2 mg, 0.2 mg, Intravenous, Q4H PRN, MARIIA Escalante-MARIYA, 0.2 mg at 11/11/23 0610    iohexol (OMNIPAQUE) 350 MG/ML injection (MULTI-DOSE) 100 mL, 100 mL, Intravenous, Once in imaging, Karissa Staley PA-C    methocarbamol (ROBAXIN) tablet 500 mg, 500 mg, Oral, Q6H PRN, Karissa Staley PA-C, 500 mg at 11/09/23 0951    nicotine (NICODERM CQ) 21 mg/24 hr TD 24 hr patch 1 patch, 1 patch, Transdermal, Daily PRN, Baylee Donohue PA-C    OLANZapine (ZyPREXA ZYDIS) dispersible tablet 5 mg, 5 mg, Oral, Q3H PRN, Isabella Key PA-C    ondansetron (ZOFRAN) injection 4 mg, 4 mg, Intravenous, Q6H PRN, Nurys Jaeger PA-C, 4 mg at 11/11/23 1019    oxyCODONE (ROXICODONE) split tablet 2.5 mg, 2.5 mg, Oral, Q6H PRN, 2.5 mg at 11/10/23 2046 **OR** oxyCODONE (ROXICODONE) IR tablet 5 mg, 5 mg, Oral, Q6H PRN, Nurys Jaeger PA-C, 5 mg at 11/12/23 0959    senna (SENOKOT) tablet 8.6 mg, 1 tablet, Oral, HS PRN, Baylee Donohue PA-C    Blood Culture:   No results found for: "BLOODCX"    Wound Culture:   No results found for: "WOUNDCULT"    Ins and Outs:  I/O last 24 hours: In: 1749 [P.O.:180; I.V.:1000; IV Piggyback:550]  Out: 425 [Urine:425]          Physical:  Vitals:    11/12/23 0801   BP: 136/94   Pulse: 91   Resp:    Temp:    SpO2: 94%     Musculoskeletal: right Lower Extremity  Visile skin is ecchymotic without erythema. Dressing C/D/I  TTP over the hip  Sensation intact over the toes  Motor intact to +FHL/EHL, +ankle dorsi/plantar flexion  Digits warm and well perfused  Capillary refill < 2 seconds    Assessment:    68 y. o.female POD 1 right hip revision with Dr. Rachael Ricketts on 11/11/2023. Plan:  WBAT RLE with abduction precautions - no resisted ABduction and no passive ADduction  Asa 81 BID for 4 weeks  Ancef x 24 hours; doxycycline 100 mg BID for 2 weeks  Preliminary culture results are negative. Ortho will follow-up on results. Will monitor for ABLA and recommend IVF/prbc as indicated for greater than 2 gram Hgb drop or Hgb < 7. Hgb this morning is 10.2. No signs of active bleeding in the operative extremity.   PT/OT  Pain control  Medical co-morbidities are being managed per primary team  Dispo: Ortho will follow      Nurys Jaeger PA-C

## 2023-11-12 NOTE — PLAN OF CARE
Problem: MOBILITY - ADULT  Goal: Maintain or return to baseline ADL function  Description: INTERVENTIONS:  -  Assess patient's ability to carry out ADLs; assess patient's baseline for ADL function and identify physical deficits which impact ability to perform ADLs (bathing, care of mouth/teeth, toileting, grooming, dressing, etc.)  - Assess/evaluate cause of self-care deficits   - Assess range of motion  - Assess patient's mobility; develop plan if impaired  - Assess patient's need for assistive devices and provide as appropriate  - Encourage maximum independence but intervene and supervise when necessary  - Involve family in performance of ADLs  - Assess for home care needs following discharge   - Consider OT consult to assist with ADL evaluation and planning for discharge  - Provide patient education as appropriate  Outcome: Progressing  Goal: Maintains/Returns to pre admission functional level  Description: INTERVENTIONS:  - Perform BMAT or MOVE assessment daily.   - Set and communicate daily mobility goal to care team and patient/family/caregiver. - Collaborate with rehabilitation services on mobility goals if consulted  - Perform Range of Motion x times a day. - Reposition patient every x hours.   - Dangle patient x times a day  - Stand patient x times a day  - Ambulate patient x times a day  - Out of bed to chair x times a day   - Out of bed for meals xx times a day  - Out of bed for toileting  - Record patient progress and toleration of activity level   Outcome: Progressing     Problem: PAIN - ADULT  Goal: Verbalizes/displays adequate comfort level or baseline comfort level  Description: Interventions:  - Encourage patient to monitor pain and request assistance  - Assess pain using appropriate pain scale  - Administer analgesics based on type and severity of pain and evaluate response  - Implement non-pharmacological measures as appropriate and evaluate response  - Consider cultural and social influences on pain and pain management  - Notify physician/advanced practitioner if interventions unsuccessful or patient reports new pain  Outcome: Progressing     Problem: INFECTION - ADULT  Goal: Absence or prevention of progression during hospitalization  Description: INTERVENTIONS:  - Assess and monitor for signs and symptoms of infection  - Monitor lab/diagnostic results  - Monitor all insertion sites, i.e. indwelling lines, tubes, and drains  - Monitor endotracheal if appropriate and nasal secretions for changes in amount and color  - [place] appropriate cooling/warming therapies per order  - Administer medications as ordered  - Instruct and encourage patient and family to use good hand hygiene technique  - Identify and instruct in appropriate isolation precautions for identified infection/condition  Outcome: Progressing  Goal: Absence of fever/infection during neutropenic period  Description: INTERVENTIONS:  - Monitor WBC    Outcome: Progressing     Problem: SAFETY ADULT  Goal: Maintain or return to baseline ADL function  Description: INTERVENTIONS:  -  Assess patient's ability to carry out ADLs; assess patient's baseline for ADL function and identify physical deficits which impact ability to perform ADLs (bathing, care of mouth/teeth, toileting, grooming, dressing, etc.)  - Assess/evaluate cause of self-care deficits   - Assess range of motion  - Assess patient's mobility; develop plan if impaired  - Assess patient's need for assistive devices and provide as appropriate  - Encourage maximum independence but intervene and supervise when necessary  - Involve family in performance of ADLs  - Assess for home care needs following discharge   - Consider OT consult to assist with ADL evaluation and planning for discharge  - Provide patient education as appropriate  Outcome: Progressing  Goal: Maintains/Returns to pre admission functional level  Description: INTERVENTIONS:  - Perform BMAT or MOVE assessment daily.   - Set and communicate daily mobility goal to care team and patient/family/caregiver. - Collaborate with rehabilitation services on mobility goals if consulted  - Perform Range of Motion x times a day. - Reposition patient every xx hours.   - Dangle patient x times a day  - Stand patient x times a day  - Ambulate patient x times a day  - Out of bed to chair x times a day   - Out of bed for meals xxxx times a day  - Out of bed for toileting  - Record patient progress and toleration of activity level   Outcome: Progressing  Goal: Patient will remain free of falls  Description: INTERVENTIONS:  - Educate patient/family on patient safety including physical limitations  - Instruct patient to call for assistance with activity   - Consult OT/PT to assist with strengthening/mobility   - Keep Call bell within reach  - Keep bed low and locked with side rails adjusted as appropriate  - Keep care items and personal belongings within reach  - Initiate and maintain comfort rounds  - Make Fall Risk Sign visible to staff  - Offer Toileting every xx Hours, in advance of need  - Initiate/Maintain xalarm  - Obtain necessary fall risk management equipment: x  - Apply yellow socks and bracelet for high fall risk patients  - Consider moving patient to room near nurses station  Outcome: Progressing     Problem: DISCHARGE PLANNING  Goal: Discharge to home or other facility with appropriate resources  Description: INTERVENTIONS:  - Identify barriers to discharge w/patient and caregiver  - Arrange for needed discharge resources and transportation as appropriate  - Identify discharge learning needs (meds, wound care, etc.)  - Arrange for interpretive services to assist at discharge as needed  - Refer to Case Management Department for coordinating discharge planning if the patient needs post-hospital services based on physician/advanced practitioner order or complex needs related to functional status, cognitive ability, or social support system  Outcome: Progressing     Problem: Knowledge Deficit  Goal: Patient/family/caregiver demonstrates understanding of disease process, treatment plan, medications, and discharge instructions  Description: Complete learning assessment and assess knowledge base.   Interventions:  - Provide teaching at level of understanding  - Provide teaching via preferred learning methods  Outcome: Progressing     Problem: Prexisting or High Potential for Compromised Skin Integrity  Goal: Skin integrity is maintained or improved  Description: INTERVENTIONS:  - Identify patients at risk for skin breakdown  - Assess and monitor skin integrity  - Assess and monitor nutrition and hydration status  - Monitor labs   - Assess for incontinence   - Turn and reposition patient  - Assist with mobility/ambulation  - Relieve pressure over bony prominences  - Avoid friction and shearing  - Provide appropriate hygiene as needed including keeping skin clean and dry  - Evaluate need for skin moisturizer/barrier cream  - Collaborate with interdisciplinary team   - Patient/family teaching  - Consider wound care consult   Outcome: Progressing

## 2023-11-12 NOTE — ASSESSMENT & PLAN NOTE
Intermittent elevated BP likely due to pain, improved this morning  Not on antihypertensives as outpt  Optimize pain control  Hydralazine PRN

## 2023-11-12 NOTE — PROGRESS NOTES
[facility]  Progress Note  Name: Charli Steward  MRN: [id number]  Unit/Bed#: -01 I Date of Admission: 11/9/2023   Date of Service: 11/12/2023 I Hospital Day: 3    Assessment/Plan   * Fracture of neck of right femur [facility])  Assessment & Plan  S/p mechanical fall on to right side with resultant right femoral neck fracture surrounding present hardware  CT LE showed status post total right hip arthroplasty with a proximal right femoral diaphysis mildly displaced periprosthetic fracture. POD 1 for right hip revision periprosthetic fracture  Continue pain control  PT/OT eval      SIRS (systemic inflammatory response syndrome) ([address])  Assessment & Plan  Patient met SIRS criteria with HR > 90, leukocytosis of 12k  Elevated heart rate likely secondary to pain. Leukocytosis reactive due to recent surgery  Afebrile. Denies any urinary complaints  Trend WBC and fever curve    Acute blood loss anemia  Assessment & Plan  Hemoglobin 14.5 down to 10.2 this morning  No evidence of active bleeding  Trend CBC    Urine retention  Assessment & Plan  Straight cath once on admit, had 600 ml   Continue urinary retention protocol    Gout  Assessment & Plan  Hx of gout - maintained of allopurinol - continue     Benign essential hypertension  Assessment & Plan  Intermittent elevated BP likely due to pain, improved this morning  Not on antihypertensives as outpt  Optimize pain control  Hydralazine PRN         VTE Pharmacologic Prophylaxis: VTE Score: 7 High Risk (Score >/= 5) - Pharmacological DVT Prophylaxis Ordered: ASA per ortho. Sequential Compression Devices Ordered. Patient Centered Rounds: I performed bedside rounds with nursing staff today. Discussions with Specialists or Other Care Team Provider: CM, ortho AP    Education and Discussions with Family / Patient: Updated  (daughter) via phone. Total Time Spent on Date of Encounter in care of patient: 45 mins.  This time was spent on one or more of the following: performing physical exam; counseling and coordination of care; obtaining or reviewing history; documenting in the medical record; reviewing/ordering tests, medications or procedures; communicating with other healthcare professionals and discussing with patient's family/caregivers. Current Length of Stay: 3 day(s)  Current Patient Status: Inpatient   Certification Statement: The patient will continue to require additional inpatient hospital stay due to PT/OT eval, monitoring hgb, ortho recs  Discharge Plan: Anticipate discharge in 24-48 hrs to discharge location to be determined pending rehab evaluations. Code Status: Level 1 - Full Code    Subjective:   Patient reports pain better controlled this morning. Denies chest pain/palpitations, shortness of breath, nausea/vomiting or abdominal pain. Objective:     Vitals:   Temp (24hrs), Av.1 °F (36.2 °C), Min:97 °F (36.1 °C), Max:97.2 °F (36.2 °C)    Temp:  [97 °F (36.1 °C)-97.2 °F (36.2 °C)] 97.2 °F (36.2 °C)  HR:  [] 91  Resp:  [16-18] 18  BP: (124-156)/(87-96) 136/94  SpO2:  [91 %-94 %] 94 %  Body mass index is 22.8 kg/m². Input and Output Summary (last 24 hours): Intake/Output Summary (Last 24 hours) at 2023 1117  Last data filed at 2023 0101  Gross per 24 hour   Intake 180 ml   Output 425 ml   Net -245 ml       Physical Exam:   Physical Exam  Vitals and nursing note reviewed. Constitutional:       Appearance: Normal appearance. Comments: No acute distress   HENT:      Head: Normocephalic. Eyes:      General: No scleral icterus. Extraocular Movements: Extraocular movements intact. Conjunctiva/sclera: Conjunctivae normal.   Cardiovascular:      Rate and Rhythm: Normal rate and regular rhythm. Heart sounds: S1 normal and S2 normal.   Pulmonary:      Effort: Pulmonary effort is normal.      Breath sounds: Normal breath sounds. No wheezing, rhonchi or rales.    Abdominal: General: Bowel sounds are normal.      Palpations: Abdomen is soft. Tenderness: There is no abdominal tenderness. There is no guarding or rebound. Musculoskeletal:         General: No swelling, tenderness or deformity. Cervical back: Normal range of motion. Comments: Able to move upper/lower extremities bilaterally. Right hip dressing clean/dry/intact   Skin:     General: Skin is warm and dry. Neurological:      General: No focal deficit present. Mental Status: She is alert. Psychiatric:         Mood and Affect: Mood normal.         Speech: Speech normal.         Behavior: Behavior normal.          Additional Data:     Labs:  Results from last 7 days   Lab Units 11/12/23  0518 11/10/23  0437 11/09/23  2233   WBC Thousand/uL 12.53*   < > 13.65*   HEMOGLOBIN g/dL 10.2*   < > 15.8*   HEMATOCRIT % 29.0*   < > 44.8   PLATELETS Thousands/uL 144*   < > 165   BANDS PCT % 2  --   --    NEUTROS PCT %  --   --  72   LYMPHS PCT %  --   --  20   LYMPHO PCT % 18  --   --    MONOS PCT %  --   --  7   MONO PCT % 12  --   --    EOS PCT % 1  --  0    < > = values in this interval not displayed.      Results from last 7 days   Lab Units 11/12/23  0518 11/11/23  0602   SODIUM mmol/L 136 139   POTASSIUM mmol/L 3.9 3.6   CHLORIDE mmol/L 106 108   CO2 mmol/L 24 24   BUN mg/dL 16 12   CREATININE mg/dL 1.02 0.93   ANION GAP mmol/L 6 7   CALCIUM mg/dL 8.5 9.1   ALBUMIN g/dL  --  3.7   TOTAL BILIRUBIN mg/dL  --  1.03*   ALK PHOS U/L  --  53   ALT U/L  --  22   AST U/L  --  36   GLUCOSE RANDOM mg/dL 124 107     Results from last 7 days   Lab Units 11/09/23  2233   INR  1.07                   Lines/Drains:  Invasive Devices       Peripheral Intravenous Line  Duration             Peripheral IV 11/11/23 Right;Ventral (anterior) Forearm <1 day                          Imaging: Reviewed radiology reports from this admission including: xray(s)    Recent Cultures (last 7 days):   Results from last 7 days   Lab Units 11/11/23  0854 11/11/23  0853 11/11/23  0852   GRAM STAIN RESULT  1+ No polys seen  No bacteria seen No Polys or Bacteria seen No Polys or Bacteria seen       Last 24 Hours Medication List:   Current Facility-Administered Medications   Medication Dose Route Frequency Provider Last Rate    acetaminophen  975 mg Oral [facility] Baylee Donohue PA-C      allopurinol  100 mg Oral Daily Baylee Donohue PA-C      aspirin  81 mg Oral BID Amira Castro PA-C      doxycycline hyclate  100 mg Oral Q12H [address] Baylee Donohue PA-C      hydrALAZINE  5 mg Intravenous Q6H PRN Amira Castro PA-C      HYDROmorphone  0.2 mg Intravenous Q4H PRN Baylee Donohue PA-C      iohexol  100 mL Intravenous Once in imaging Amira Castro PA-C      methocarbamol  500 mg Oral Q6H PRN Amira Castro PA-C      nicotine  1 patch Transdermal Daily PRN Amira Castro PA-C      OLANZapine  5 mg Oral Q3H PRN Hipolito Loomis PA-C      ondansetron  4 mg Intravenous Q6H PRN Amira Castro PA-C      oxyCODONE  2.5 mg Oral Q6H PRN Amira Castro PA-C      Or    oxyCODONE  5 mg Oral Q6H PRN Amira Castro PA-C      senna  1 tablet Oral HS PRN Amira Castro PA-C          Today, Patient Was Seen By: Kassandra Santos PA-C    **Please Note: This note may have been constructed using a voice recognition system. **

## 2023-11-12 NOTE — ASSESSMENT & PLAN NOTE
S/p mechanical fall on to right side with resultant right femoral neck fracture surrounding present hardware  CT LE showed status post total right hip arthroplasty with a proximal right femoral diaphysis mildly displaced periprosthetic fracture.    POD 1 for right hip revision periprosthetic fracture  Continue pain control  PT/OT eval

## 2023-11-13 ENCOUNTER — HOME HEALTH ADMISSION (OUTPATIENT)
Dept: HOME HEALTH SERVICES | Facility: HOME HEALTHCARE | Age: 73
End: 2023-11-13
Payer: MEDICARE

## 2023-11-13 VITALS
HEART RATE: 90 BPM | WEIGHT: 137 LBS | DIASTOLIC BLOOD PRESSURE: 81 MMHG | TEMPERATURE: 99.2 F | OXYGEN SATURATION: 96 % | RESPIRATION RATE: 16 BRPM | HEIGHT: 65 IN | BODY MASS INDEX: 22.82 KG/M2 | SYSTOLIC BLOOD PRESSURE: 116 MMHG

## 2023-11-13 PROBLEM — D62 ACUTE BLOOD LOSS ANEMIA: Status: RESOLVED | Noted: 2023-11-12 | Resolved: 2023-11-13

## 2023-11-13 PROBLEM — S72.001A FRACTURE OF NECK OF RIGHT FEMUR (HCC): Status: RESOLVED | Noted: 2023-11-09 | Resolved: 2023-11-13

## 2023-11-13 PROBLEM — R33.9 URINE RETENTION: Status: RESOLVED | Noted: 2023-11-10 | Resolved: 2023-11-13

## 2023-11-13 PROBLEM — R65.10 SIRS (SYSTEMIC INFLAMMATORY RESPONSE SYNDROME) (HCC): Status: RESOLVED | Noted: 2023-11-12 | Resolved: 2023-11-13

## 2023-11-13 LAB
ANION GAP SERPL CALCULATED.3IONS-SCNC: 4 MMOL/L
BASOPHILS # BLD AUTO: 0.04 THOUSANDS/ÂΜL (ref 0–0.1)
BASOPHILS NFR BLD AUTO: 0 % (ref 0–1)
BUN SERPL-MCNC: 17 MG/DL (ref 5–25)
CALCIUM SERPL-MCNC: 8.3 MG/DL (ref 8.4–10.2)
CHLORIDE SERPL-SCNC: 106 MMOL/L (ref 96–108)
CO2 SERPL-SCNC: 27 MMOL/L (ref 21–32)
CREAT SERPL-MCNC: 0.99 MG/DL (ref 0.6–1.3)
EOSINOPHIL # BLD AUTO: 0.04 THOUSAND/ÂΜL (ref 0–0.61)
EOSINOPHIL NFR BLD AUTO: 0 % (ref 0–6)
ERYTHROCYTE [DISTWIDTH] IN BLOOD BY AUTOMATED COUNT: 13.1 % (ref 11.6–15.1)
FUNGUS SPEC CULT: NORMAL
GFR SERPL CREATININE-BSD FRML MDRD: 57 ML/MIN/1.73SQ M
GLUCOSE SERPL-MCNC: 97 MG/DL (ref 65–140)
HCT VFR BLD AUTO: 29.3 % (ref 34.8–46.1)
HGB BLD-MCNC: 10 G/DL (ref 11.5–15.4)
IMM GRANULOCYTES # BLD AUTO: 0.03 THOUSAND/UL (ref 0–0.2)
IMM GRANULOCYTES NFR BLD AUTO: 0 % (ref 0–2)
LYMPHOCYTES # BLD AUTO: 3.09 THOUSANDS/ÂΜL (ref 0.6–4.47)
LYMPHOCYTES NFR BLD AUTO: 33 % (ref 14–44)
MCH RBC QN AUTO: 34 PG (ref 26.8–34.3)
MCHC RBC AUTO-ENTMCNC: 34.1 G/DL (ref 31.4–37.4)
MCV RBC AUTO: 100 FL (ref 82–98)
MONOCYTES # BLD AUTO: 1.11 THOUSAND/ÂΜL (ref 0.17–1.22)
MONOCYTES NFR BLD AUTO: 12 % (ref 4–12)
NEUTROPHILS # BLD AUTO: 5.09 THOUSANDS/ÂΜL (ref 1.85–7.62)
NEUTS SEG NFR BLD AUTO: 55 % (ref 43–75)
NRBC BLD AUTO-RTO: 0 /100 WBCS
PLATELET # BLD AUTO: 145 THOUSANDS/UL (ref 149–390)
PMV BLD AUTO: 11.5 FL (ref 8.9–12.7)
POTASSIUM SERPL-SCNC: 4 MMOL/L (ref 3.5–5.3)
RBC # BLD AUTO: 2.94 MILLION/UL (ref 3.81–5.12)
SODIUM SERPL-SCNC: 137 MMOL/L (ref 135–147)
WBC # BLD AUTO: 9.4 THOUSAND/UL (ref 4.31–10.16)

## 2023-11-13 PROCEDURE — 99239 HOSP IP/OBS DSCHRG MGMT >30: CPT | Performed by: PHYSICIAN ASSISTANT

## 2023-11-13 PROCEDURE — 80048 BASIC METABOLIC PNL TOTAL CA: CPT | Performed by: PHYSICIAN ASSISTANT

## 2023-11-13 PROCEDURE — 99232 SBSQ HOSP IP/OBS MODERATE 35: CPT | Performed by: PHYSICIAN ASSISTANT

## 2023-11-13 PROCEDURE — 97163 PT EVAL HIGH COMPLEX 45 MIN: CPT

## 2023-11-13 PROCEDURE — 97167 OT EVAL HIGH COMPLEX 60 MIN: CPT

## 2023-11-13 PROCEDURE — 97116 GAIT TRAINING THERAPY: CPT

## 2023-11-13 PROCEDURE — 99024 POSTOP FOLLOW-UP VISIT: CPT | Performed by: PHYSICIAN ASSISTANT

## 2023-11-13 PROCEDURE — 85025 COMPLETE CBC W/AUTO DIFF WBC: CPT | Performed by: PHYSICIAN ASSISTANT

## 2023-11-13 RX ADMIN — ALLOPURINOL 100 MG: 100 TABLET ORAL at 11:04

## 2023-11-13 RX ADMIN — ASPIRIN 81 MG CHEWABLE TABLET 81 MG: 81 TABLET CHEWABLE at 11:04

## 2023-11-13 RX ADMIN — ACETAMINOPHEN 975 MG: 325 TABLET, FILM COATED ORAL at 13:05

## 2023-11-13 RX ADMIN — ACETAMINOPHEN 975 MG: 325 TABLET, FILM COATED ORAL at 05:00

## 2023-11-13 RX ADMIN — DOXYCYCLINE 100 MG: 100 CAPSULE ORAL at 11:04

## 2023-11-13 NOTE — ASSESSMENT & PLAN NOTE
S/p mechanical fall on to right side with resultant right femoral neck fracture surrounding present hardware  CT LE showed status post total right hip arthroplasty with a proximal right femoral diaphysis mildly displaced periprosthetic fracture. POD 2 for right hip revision periprosthetic fracture  Continue pain control  PT/OT eval for disposition planning -recommending rehab.   Patient and family have elected that patient return home with VNA services which has been arranged by case management  Stable for discharge

## 2023-11-13 NOTE — PROGRESS NOTES
[address] [facility]  Progress Note  Name: Chanel Mcnamara  MRN: [id number]  Unit/Bed#: -01 I Date of Admission: 11/9/2023   Date of Service: 11/13/2023 I Hospital Day: 4    Assessment/Plan   * Fracture of neck of right femur [facility])  Assessment & Plan  S/p mechanical fall on to right side with resultant right femoral neck fracture surrounding present hardware  CT LE showed status post total right hip arthroplasty with a proximal right femoral diaphysis mildly displaced periprosthetic fracture. POD 2 for right hip revision periprosthetic fracture  Continue pain control  PT/OT eval for disposition planning      Acute blood loss anemia  Assessment & Plan  Hemoglobin 14.5 initially down to 10.2   Stable at 10.0 this morning  No evidence of active bleeding  Trend CBC    Urine retention  Assessment & Plan  Straight cath once on admit, had 600 ml   Continue to monitor via urinary retention protocol    Gout  Assessment & Plan  Hx of gout - maintained of allopurinol - continue     Benign essential hypertension  Assessment & Plan  Intermittent elevated BP likely due to pain, now stable  Not on antihypertensives as outpt  Optimize pain control  Hydralazine PRN    SIRS (systemic inflammatory response syndrome) (HCC)-resolved as of 11/13/2023  Assessment & Plan  Patient met SIRS criteria with HR > 90, leukocytosis of 12k  Elevated heart rate likely secondary to pain. Leukocytosis reactive due to recent surgery  Afebrile. Denies any urinary complaints  Leukocytosis resolved  Trend WBC and fever curve         VTE Pharmacologic Prophylaxis: VTE Score: 7 High Risk (Score >/= 5) - Pharmacological DVT Prophylaxis Ordered: ASA BID per ortho. Sequential Compression Devices Ordered. Patient Centered Rounds: I performed bedside rounds with nursing staff today.   Discussions with Specialists or Other Care Team Provider: SERG TT to ortho AP    Education and Discussions with Family / Patient: Updated  () via phone. Left voicemail for patient daughter. Total Time Spent on Date of Encounter in care of patient: 40 mins. This time was spent on one or more of the following: performing physical exam; counseling and coordination of care; obtaining or reviewing history; documenting in the medical record; reviewing/ordering tests, medications or procedures; communicating with other healthcare professionals and discussing with patient's family/caregivers. Current Length of Stay: 4 day(s)  Current Patient Status: Inpatient   Certification Statement: The patient will continue to require additional inpatient hospital stay due to PT/OT eval  Discharge Plan: Anticipate discharge later today or tomorrow to discharge location to be determined pending rehab evaluations. Code Status: Level 1 - Full Code    Subjective:   Patient overall feels well this morning. Pain is well controlled. Denies chest pain/palpitations, shortness of breath, nausea/vomiting or abdominal pain. Objective:     Vitals:   Temp (24hrs), Av.2 °F (37.3 °C), Min:99.2 °F (37.3 °C), Max:99.2 °F (37.3 °C)    Temp:  [99.2 °F (37.3 °C)] 99.2 °F (37.3 °C)  HR:  [89-98] 90  Resp:  [16] 16  BP: (116-130)/(81-84) 116/81  SpO2:  [95 %-96 %] 96 %  Body mass index is 22.8 kg/m². Input and Output Summary (last 24 hours): Intake/Output Summary (Last 24 hours) at 2023 0956  Last data filed at 2023 0848  Gross per 24 hour   Intake 120 ml   Output --   Net 120 ml       Physical Exam:   Physical Exam  Vitals and nursing note reviewed. Constitutional:       Appearance: Normal appearance. Comments: No acute distress   HENT:      Head: Normocephalic. Eyes:      General: No scleral icterus. Extraocular Movements: Extraocular movements intact. Conjunctiva/sclera: Conjunctivae normal.   Cardiovascular:      Rate and Rhythm: Normal rate and regular rhythm.       Heart sounds: S1 normal and S2 normal. Pulmonary:      Effort: Pulmonary effort is normal.      Breath sounds: Normal breath sounds. No wheezing, rhonchi or rales. Abdominal:      General: Bowel sounds are normal.      Palpations: Abdomen is soft. Tenderness: There is no abdominal tenderness. There is no guarding or rebound. Musculoskeletal:         General: No swelling, tenderness or deformity. Cervical back: Normal range of motion. Comments: Able to move upper/lower extremities bilaterally. No edema   Skin:     General: Skin is warm and dry. Findings: Ecchymosis present. Comments: Mild ecchymosis noted near surgical site of right hip but no firmness to suggest hematoma   Neurological:      Mental Status: She is alert and oriented to person, place, and time. Psychiatric:         Mood and Affect: Mood normal.         Speech: Speech normal.         Behavior: Behavior normal.          Additional Data:     Labs:  Results from last 7 days   Lab Units 11/13/23 0455 11/12/23 0518   WBC Thousand/uL 9.40 12.53*   HEMOGLOBIN g/dL 10.0* 10.2*   HEMATOCRIT % 29.3* 29.0*   PLATELETS Thousands/uL 145* 144*   BANDS PCT %  --  2   NEUTROS PCT % 55  --    LYMPHS PCT % 33  --    LYMPHO PCT %  --  18   MONOS PCT % 12  --    MONO PCT %  --  12   EOS PCT % 0 1     Results from last 7 days   Lab Units 11/13/23 0455 11/12/23 0518 11/11/23  0602   SODIUM mmol/L 137   < > 139   POTASSIUM mmol/L 4.0   < > 3.6   CHLORIDE mmol/L 106   < > 108   CO2 mmol/L 27   < > 24   BUN mg/dL 17   < > 12   CREATININE mg/dL 0.99   < > 0.93   ANION GAP mmol/L 4   < > 7   CALCIUM mg/dL 8.3*   < > 9.1   ALBUMIN g/dL  --   --  3.7   TOTAL BILIRUBIN mg/dL  --   --  1.03*   ALK PHOS U/L  --   --  53   ALT U/L  --   --  22   AST U/L  --   --  36   GLUCOSE RANDOM mg/dL 97   < > 107    < > = values in this interval not displayed.      Results from last 7 days   Lab Units 11/09/23  2233   INR  1.07                   Lines/Drains:  Invasive Devices       Peripheral Intravenous Line  Duration             Peripheral IV 11/11/23 Right;Ventral (anterior) Forearm 1 day                          Imaging: No pertinent imaging reviewed. Recent Cultures (last 7 days):   Results from last 7 days   Lab Units 11/11/23  0854 11/11/23  0853 11/11/23  0852   GRAM STAIN RESULT  1+ No polys seen  No bacteria seen No Polys or Bacteria seen No Polys or Bacteria seen       Last 24 Hours Medication List:   Current Facility-Administered Medications   Medication Dose Route Frequency Provider Last Rate    acetaminophen  975 mg Oral [facility] Baylee Donohue PA-C      allopurinol  100 mg Oral Daily Baylee Donohue PA-C      aspirin  81 mg Oral BID Erminio MunMISSAEL sepulveda      doxycycline hyclate  100 mg Oral Q12H [address] Baylee Donohue PA-C      hydrALAZINE  5 mg Intravenous Q6H PRN Erminio MISSAEL Crooks      HYDROmorphone  0.2 mg Intravenous Q4H PRN Bayele Donohue PA-C      iohexol  100 mL Intravenous Once in imaging Donitao MISSAEL Crooks      methocarbamol  500 mg Oral Q6H PRN Erminio Munda, MISSAEL      nicotine  1 patch Transdermal Daily PRN Erminio Munda, MISSAEL      OLANZapine  5 mg Oral Q3H PRN Paramjit Bonilla PA-C      ondansetron  4 mg Intravenous Q6H PRN Erminio Munda, MISSAEL      oxyCODONE  2.5 mg Oral Q6H PRN Sherlyinio MISSAEL Crooks      Or    oxyCODONE  5 mg Oral Q6H PRN Erminio Valeriy, MISSAEL      senna  1 tablet Oral HS PRN Erminivladimir Crooks PA-C          Today, Patient Was Seen By: Michel Alvarez PA-C    **Please Note: This note may have been constructed using a voice recognition system. **

## 2023-11-13 NOTE — DISCHARGE SUMMARY
[address]  [facility] 1950, 68 y[facility] female MRN: [id number]  Unit/Bed#: -[place] Encounter: [id number]  Primary Care Provider: Fernando Arambula DO   Date and time admitted to hospital: 11/9/2023  7:00 PM    * Fracture of neck of right femur (HCC)-resolved as of 11/13/2023  Assessment & Plan  S/p mechanical fall on to right side with resultant right femoral neck fracture surrounding present hardware  CT LE showed status post total right hip arthroplasty with a proximal right femoral diaphysis mildly displaced periprosthetic fracture. POD 2 for right hip revision periprosthetic fracture  Continue pain control  PT/OT eval for disposition planning -recommending rehab. Patient and family have elected that patient return home with VNA services which has been arranged by case management  Stable for discharge       Gout  Assessment & Plan  Hx of gout - maintained of allopurinol - continue     Benign essential hypertension  Assessment & Plan  Intermittent elevated BP likely due to pain, now stable  Not on antihypertensives as outpt    SIRS (systemic inflammatory response syndrome) (HCC)-resolved as of 11/13/2023  Assessment & Plan  Patient met SIRS criteria with HR > 90, leukocytosis of 12k  Elevated heart rate likely secondary to pain. Leukocytosis reactive due to recent surgery  Afebrile.   Denies any urinary complaints  Leukocytosis resolved    Acute blood loss anemia-resolved as of 11/13/2023  Assessment & Plan  Hemoglobin 14.5 initially down to 10.2   Stable at 10.0 this morning  No evidence of active bleeding      Medical Problems       Resolved Problems  Date Reviewed: 11/13/2023            Resolved    * (Principal) Fracture of neck of right femur ([address]) 11/13/2023     Resolved by  Hanane Hall PA-C    Urine retention 11/13/2023     Resolved by  Hanane Hall PA-C    Acute blood loss anemia 11/13/2023     Resolved by  Hanane Hall PA-C    SIRS (systemic inflammatory response syndrome) (HCC) 11/13/2023     Resolved by  Hanane Hall PA-C        Discharging Physician / Practitioner: Hanane Hall PA-C  PCP: Fernando Arambula DO  Admission Date:   Admission Orders (From admission, onward)       Ordered        11/09/23 2051  INPATIENT ADMISSION  Once                          Discharge Date: 11/13/23    Consultations During Hospital Stay:  Orthopedics  PT/OT  Case management    Procedures Performed:   Right total hip revision for periprosthetic fracture    Significant Findings / Test Results:   XR hip/pelvis right: Periprosthetic fracture of the proximal right femur  CXR: No acute cardiopulmonary disease  CT right lower extremity: S/p total right hip arthroplasty with proximal right femoral diaphysis mildly displaced periprosthetic fracture    Incidental Findings:   None      Test Results Pending at Discharge (will require follow up):   OR culture results to be followed up by orthopedics     Outpatient Tests Requested:  None    Complications: None    Reason for Admission: Fracture of right femur    Hospital Course:   Samuel Harrison is a 68 y.o. female patient who originally presented to the hospital on 11/9/2023 due to fall and right hip pain. Past medical history significant for osteoarthritis and prior joint replacements, gout. Presented to the emergency department due to mechanical fall and subsequent right hip pain. Patient was found to have periprosthetic fracture on admission. Orthopedics recommended right total hip revision. PT/OT was consulted and recommended rehab postop however patient and family have elected to return home with VNA services which was arranged by case management. Patient empirically was started on doxycycline per orthopedics recommendations and their service will follow-up culture results taken during surgery. Will require ASA twice daily for 4 weeks for DVT prophylaxis.   On day of discharge pain was well controlled, patient was hemodynamically stable and patient verbalized understanding for requested outpatient follow-up. Please see above list of diagnoses and related plan for additional information. Condition at Discharge: stable    Discharge Day Visit / Exam:   * Please refer to separate progress note for these details *    Discussion with Family:  Medical update provided earlier to patient's . CM discussed disposition plan. Discharge instructions/Information to patient and family:   See after visit summary for information provided to patient and family. Provisions for Follow-Up Care:  See after visit summary for information related to follow-up care and any pertinent home health orders. Disposition:   Home with VNA Services (Reminder: Complete face to face encounter)    Planned Readmission: None     Discharge Statement:  I spent 60 minutes discharging the patient. This time was spent on the day of discharge. I had direct contact with the patient on the day of discharge. Greater than 50% of the total time was spent examining patient, answering all patient questions, arranging and discussing plan of care with patient as well as directly providing post-discharge instructions. Additional time then spent on discharge activities. Discharge Medications:  See after visit summary for reconciled discharge medications provided to patient and/or family.       **Please Note: This note may have been constructed using a voice recognition system**

## 2023-11-13 NOTE — ASSESSMENT & PLAN NOTE
Hemoglobin 14.5 initially down to 10.2   Stable at 10.0 this morning  No evidence of active bleeding  Trend CBC

## 2023-11-13 NOTE — OCCUPATIONAL THERAPY NOTE
Occupational Therapy Evaluation      Carli Alas    11/13/2023    Principal Problem:    Fracture of neck of right femur ([address])  Active Problems:    Benign essential hypertension    Gout    Urine retention    Acute blood loss anemia      History reviewed. No pertinent past medical history. Past Surgical History:   Procedure Laterality Date    REPLACEMENT TOTAL KNEE          11/13/23 1050   OT Last Visit   OT Visit Date 11/13/23   Note Type   Note type Evaluation   Pain Assessment   Pain Assessment Tool Ayala-Baker FACES   Pain Score No Pain  (at rest)   Ayala-Agarwal FACES Pain Rating 4   Pain Location/Orientation Orientation: Right;Location: Hip   Restrictions/Precautions   Weight Bearing Precautions Per Order Yes   RLE Weight Bearing Per Order WBAT   Other Precautions Fall Risk;Pain;Cognitive; Chair Alarm; Bed Alarm  (ABduction precautions)   Home Living   Type of [address] [place] [address] One level;Ramped entrance  (ranch)   [place]  (doesn't use at baseline)   Prior Function   Level of [place] Independent with ADLs; Independent with IADLS   Lives With Spouse   IADLs Independent with driving; Independent with medication management; Independent with meal prep   Falls in the last 6 months 1 to 4   General   Additional Pertinent History POD2   ADL   Eating Assistance 7  Independent   Grooming Assistance 5  Supervision/Setup   UB Bathing Assistance 5  Supervision/Setup   LB Bathing Assistance 2  Maximal Assistance   UB Dressing Assistance 4  Minimal Assistance   LB Dressing Assistance 2  Maximal Assistance   Toileting Assistance  2  Maximal Assistance   Bed Mobility   Supine to Sit 3  Moderate assistance   Additional items Assist x 2   Sit to Supine 3  Moderate assistance   Additional items Assist x 2   Transfers   Sit to Stand 4  Minimal assistance   Additional items Assist x 1   Stand to Sit 4  Minimal assistance   Additional items Assist x 1   Stand pivot 4  Minimal assistance   Additional items Assist x 1  (RW)   Functional Mobility   Functional Mobility 4  Minimal assistance   Additional Comments x1, with chair follow   Additional items Rolling walker   Activity Tolerance   Activity Tolerance Patient limited by pain   Medical Staff Made Aware PT Sharri   Nurse Made Aware RN Carrillo Tuttle   RUE Assessment   RUE Assessment WFL   LUE Assessment   LUE Assessment WFL   Hand Function   Gross Motor Coordination Functional   Fine Motor Coordination Functional   Cognition   Overall Cognitive Status Impaired   Arousal/Participation Alert   Attention Attends with cues to redirect   Orientation Level Oriented to person;Oriented to place;Oriented to situation;Disoriented to time   Memory Decreased recall of precautions;Decreased recall of recent events;Decreased short term memory;Decreased recall of biographical information;Decreased long term memory   Following Commands Follows one step commands with increased time or repetition   Comments Redirection and verbal cues required throughout   Assessment   Limitation Decreased ADL status; Decreased Safe judgement during ADL;Decreased cognition;Decreased endurance;Decreased self-care trans;Decreased high-level ADLs   Prognosis Good   Assessment Pt is a 68 y.o. female seen for OT evaluation at [facility], admitted 11/9/2023 w/ Fracture of neck of right femur ([address] OT completed extensive review of pt's medical and social history. Comorbidities affecting pt's functional performance at time of assessment include: HTN, gout, urine retention, anemia, chronic back pain, osteoarthritis, HTN. Personal factors affecting pt at time of IE include:limited home support, behavioral pattern, difficulty performing ADLS, difficulty performing IADLS , limited insight into deficits, decreased initiation and engagement , and health management . Prior to admission, pt was living in ranch with ramped entrance, with spouse, and was independent with ADL/IADL and driving.  Upon evaluation, pt presents to OT below baseline due to the following performance deficits: weakness, decreased strength, decreased balance, decreased tolerance, impaired memory, impaired sequencing, impaired problem solving, impulsivity, and decreased safety awareness. Pt to benefit from continued skilled OT tx while in the hospital to address deficits as defined above and maximize level of functional independence w ADL's and functional mobility. Occupational Performance areas to address include: grooming, bathing/shower, toilet hygiene, dressing, functional mobility, and functional transfers, bed mobility . The patient's raw score on the AM-PAC Daily Activity inpatient short form is 16, standardized score is 35.96, less than 39.4. Patients at this level are likely to benefit from DC to post-acute rehabilitation services. Based on findings, pt is of high complexity, due to medical comorbidities. Pt seen as a co-eval with PT due to the patient's co-morbidities, clinically unstable presentation, and present impairments which are a regression from the patient's baseline. At this time, OT recommendations at time of discharge are level 2. Plan   Treatment Interventions ADL retraining;Functional transfer training;UE strengthening/ROM; Endurance training;Cognitive reorientation;Patient/family training;Equipment evaluation/education; Compensatory technique education;Continued evaluation; Energy conservation   Goal Expiration Date 11/23/23   OT Frequency 3-5x/wk   Discharge Recommendation   Rehab Resource Intensity Level, OT II (Moderate Resource Intensity)   AM-PAC Daily Activity Inpatient   Lower Body Dressing 2   Bathing 2   Toileting 2   Upper Body Dressing 3   Grooming 3   Eating 4   Daily Activity Raw Score 16   Daily Activity Standardized Score (Calc for Raw Score >=11) 35.96   AM-PAC Applied Cognition Inpatient   Following a Speech/Presentation 3   Understanding Ordinary Conversation 4   Taking Medications 3   Remembering Where Things Are Placed or Put Away 3   Remembering List of 4-5 Errands 3   Taking Care of Complicated Tasks 2   Applied Cognition Raw Score 18   Applied Cognition Standardized Score 38.07     Pt will achieve the following goals within 10 days. *Pt will complete grooming with independence. *Pt will complete UB bathing and dressing with independence. *Pt will complete LB bathing and dressing with modified independence . *Pt will complete toileting (hygiene and clothing management) with modified independence    *Pt will complete bed mobility with modified independence, with bed flat and no side rail to prep for purposeful tasks    *Pt will perform functional transfers with supervision in order to complete ADL routine. *Pt will increase standing tolerance to 3-5 minutes in order to complete ADL routine. *Pt will complete item retrieval and light home management with supervision while demonstrating good safety. *Pt will demonstrate increased activity tolerance in order to complete ADL routine. *Pt will participate in cognitive assessment to determine level of safety for returning home    *Pt will participate in UE therapeutic exercise in order to maximize strength for ADL transfers. *Pt will sit on EOB for 10+ minutes for increased safety with seated activity tolerance during ADL tasks. *Pt will identify 3-5 fall risks to ensure safety upon discharge.     Rewarding Return, MS, OTR/L

## 2023-11-13 NOTE — ASSESSMENT & PLAN NOTE
Patient met SIRS criteria with HR > 90, leukocytosis of 12k  Elevated heart rate likely secondary to pain. Leukocytosis reactive due to recent surgery  Afebrile.   Denies any urinary complaints  Leukocytosis resolved  Trend WBC and fever curve

## 2023-11-13 NOTE — PHYSICAL THERAPY NOTE
PHYSICAL THERAPY EVAL/NOTE  Physical Therapy Evaluation    Performed at least 2 patient identifiers during session:  Patient Active Problem List   Diagnosis    Abnormal mammogram    Acute arthropathy    Back pain, chronic    Benign essential hypertension    Candidal intertrigo    Chronic sinusitis    Dyshidrotic eczema    Generalized osteoarthritis    Gout    Hip pain    HTN (hypertension)    Head injury    Suture check    Fracture of neck of right femur ([address])    Urine retention    Acute blood loss anemia       History reviewed. No pertinent past medical history. Past Surgical History:   Procedure Laterality Date    REPLACEMENT TOTAL KNEE            11/13/23 1053   PT Last Visit   PT Visit Date 11/13/23   Note Type   Note type Evaluation   Pain Assessment   Pain Assessment Tool Ayala-Baker FACES   Ayala-Baker FACES Pain Rating 8   Pain Location/Orientation Orientation: Right;Location: Leg   Hospital Pain Intervention(s) Repositioned; Ambulation/increased activity   Restrictions/Precautions   Weight Bearing Precautions Per Order Yes   RLE Weight Bearing Per Order WBAT   Other Precautions Pain; Fall Risk;Bed Alarm; Chair Alarm  (No passive adduction, no resisted abduction)   Home Living   Type of [address] [place] [address] One level;Ramped entrance  (Chairlift down to basement)   Bathroom Shower/Tub Tub/shower unit   [employer] Tub transfer bench   Home Equipment Walker   Additional Comments Was not using an AD prior to admission   Prior Function   Level of [place] Independent with ADLs; Independent with functional mobility; Independent with IADLS   Lives With Spouse   IADLs Independent with driving; Independent with meal prep; Independent with medication management   Falls in the last 6 months 1 to 4   General   Family/Caregiver Present No   Cognition   Overall Cognitive Status [facility]   Arousal/Participation Alert   Orientation Level Oriented X4   Memory Within functional limits   Following Commands Follows one step commands with increased time or repetition   Comments Patient distracted at times needing re-direction   Subjective   Subjective "It feels OK when I don't move it."   LLE Assessment   LLE Assessment WFL   Bed Mobility   Supine to Sit 3  Moderate assistance   Additional items Assist x 2;HOB elevated; Bedrails; Increased time required;Verbal cues  (Excessive amount of time to complete due to pain)   Transfers   Stand pivot 4  Minimal assistance   Additional items Assist x 2;Armrests; Increased time required;Verbal cues   Additional Comments Stand pivot transfer to the commode. Refer to treatment session for additional mobility   Balance   Static Sitting Fair +   Dynamic Sitting Fair   Static Standing Fair -   Dynamic Standing Fair -   Ambulatory Fair -   Endurance Deficit   Endurance Deficit Yes   Endurance Deficit Description Limited by pain   Activity Tolerance   Activity Tolerance Patient limited by pain   Medical Staff Made Aware Aurelia DONALD   Nurse Made Aware Savana DIAZ Courser   Assessment   Prognosis Good   Problem List Decreased strength;Decreased range of motion;Decreased endurance; Impaired balance;Decreased mobility; Decreased safety awareness;Orthopedic restrictions;Pain   Assessment Patient is a 69y/o F who presented after a fall. POD 2 right total hip revision for periprosthetic fracture . Patient resides with spouse in a ranch home with ramped entrance. Patient is independent at baseline without an AD. Current medical status includes pain, fall risk, bed/chair alarm, decreased attention, No resisted abduction, no passive adduction, WBAT, decreased strength, balance, endurance and mobility. Patient was received supine in bed. She required assistance of 1-2 for bed mobility, transfers, and a short amb distance. Amb distance limited by pain and fatigue. Patient is deconditioned and is not at her functional baseline.  Recommending level 2 resources. Moderate intensity. The patient's [facility] Basic Mobility Inpatient Short Form Raw Score is 12. A Raw score of less than or equal to 17 suggests the patient may benefit from discharge to post-acute rehabilitation services. Please also refer to the recommendation of the Physical Therapist for safe discharge planning. Barriers to Discharge Decreased caregiver support   Goals   Patient Goals To have less pain   STG Expiration Date 11/27/23   Short Term Goal #1 1. Perform supine<>sit with HOB flat without the use of bedrails min A x 1 2. Perform sit<>stand transfers with supervision 3. Ambulate 100ft with a RW supervision level   Short Term Goal #2 4. Maintain precautions 100% of the time   PT Treatment Day 1   Plan   Treatment/Interventions Functional transfer training;LE strengthening/ROM; Elevations; Therapeutic exercise; Endurance training;Patient/family training;Equipment eval/education; Bed mobility;Gait training;Spoke to nursing;OT;Spoke to case management   PT Frequency 3-5x/wk   Discharge Recommendation   Rehab Resource Intensity Level, PT II (Moderate Resource Intensity)   AM-PAC Basic Mobility Inpatient   Turning in Flat Bed Without Bedrails 2   Lying on Back to Sitting on Edge of Flat Bed Without Bedrails 2   Moving Bed to Chair 2   Standing Up From Chair Using Arms 3   Walk in Room 2   Climb 3-5 Stairs With Railing 1   Basic Mobility Inpatient Raw Score 12   Basic Mobility Standardized Score 32.23   Highest Level Of Mobility   JH-HLM Goal 4: Move to chair/commode   JH-HLM Achieved 4: Move to chair/commode   Additional Treatment Session   Start Time 1043   End Time 1053   Treatment Assessment Performed additional sit<>stand transfer with min A x 1. Verbal cues for hand placement and preparatory posture. Ambulated 8ft with RW with min A x 1 with a chair follow. Step to pattern. Decreased stance on the RLE, antalgic gait.  Toe out posture on the RLE   Equipment Use RW   End of Consult   Patient Position at End of Consult Bedside chair;Bed/Chair alarm activated; All needs within reach     Mirna Foster, PT             Patient Name: Azeb Negrete  [id number] Date: 11/13/2023

## 2023-11-13 NOTE — PLAN OF CARE
Problem: PHYSICAL THERAPY ADULT  Goal: Performs mobility at highest level of function for planned discharge setting. See evaluation for individualized goals. Description: Treatment/Interventions: Functional transfer training, LE strengthening/ROM, Elevations, Therapeutic exercise, Endurance training, Patient/family training, Equipment eval/education, Bed mobility, Gait training, Spoke to nursing, OT, Spoke to case management          See flowsheet documentation for full assessment, interventions and recommendations. Note: Prognosis: Good  Problem List: Decreased strength, Decreased range of motion, Decreased endurance, Impaired balance, Decreased mobility, Decreased safety awareness, Orthopedic restrictions, Pain  Assessment: Patient is a 69y/o F who presented after a fall. POD 2 right total hip revision for periprosthetic fracture . Patient resides with spouse in a ranch home with ramped entrance. Patient is independent at baseline without an AD. Current medical status includes pain, fall risk, bed/chair alarm, decreased attention, No resisted abduction, no passive adduction, WBAT, decreased strength, balance, endurance and mobility. Patient was received supine in bed. She required assistance of 1-2 for bed mobility, transfers, and a short amb distance. Amb distance limited by pain and fatigue. Patient is deconditioned and is not at her functional baseline. Recommending level 2 resources. Moderate intensity. The patient's AM-PAC Basic Mobility Inpatient Short Form Raw Score is 12. A Raw score of less than or equal to 17 suggests the patient may benefit from discharge to post-acute rehabilitation services. Please also refer to the recommendation of the Physical Therapist for safe discharge planning. Barriers to Discharge: Decreased caregiver support     Rehab Resource Intensity Level, PT: II (Moderate Resource Intensity)    See flowsheet documentation for full assessment.

## 2023-11-13 NOTE — PLAN OF CARE
Problem: MOBILITY - ADULT  Goal: Maintain or return to baseline ADL function  Description: INTERVENTIONS:  -  Assess patient's ability to carry out ADLs; assess patient's baseline for ADL function and identify physical deficits which impact ability to perform ADLs (bathing, care of mouth/teeth, toileting, grooming, dressing, etc.)  - Assess/evaluate cause of self-care deficits   - Assess range of motion  - Assess patient's mobility; develop plan if impaired  - Assess patient's need for assistive devices and provide as appropriate  - Encourage maximum independence but intervene and supervise when necessary  - Involve family in performance of ADLs  - Assess for home care needs following discharge   - Consider OT consult to assist with ADL evaluation and planning for discharge  - Provide patient education as appropriate  Outcome: Progressing  Goal: Maintains/Returns to pre admission functional level  Description: INTERVENTIONS:  - Perform BMAT or MOVE assessment daily.   - Set and communicate daily mobility goal to care team and patient/family/caregiver. - Collaborate with rehabilitation services on mobility goals if consulted  - Perform Range of Motion 3 times a day. - Reposition patient every 3 hours.   - Dangle patient 3 times a day  - Stand patient 3 times a day  - Ambulate patient 3 times a day  - Out of bed to chair 3 times a day   - Out of bed for meals 3 times a day  - Out of bed for toileting  - Record patient progress and toleration of activity level   Outcome: Progressing     Problem: PAIN - ADULT  Goal: Verbalizes/displays adequate comfort level or baseline comfort level  Description: Interventions:  - Encourage patient to monitor pain and request assistance  - Assess pain using appropriate pain scale  - Administer analgesics based on type and severity of pain and evaluate response  - Implement non-pharmacological measures as appropriate and evaluate response  - Consider cultural and social influences on pain and pain management  - Notify physician/advanced practitioner if interventions unsuccessful or patient reports new pain  Outcome: Progressing     Problem: INFECTION - ADULT  Goal: Absence or prevention of progression during hospitalization  Description: INTERVENTIONS:  - Assess and monitor for signs and symptoms of infection  - Monitor lab/diagnostic results  - Monitor all insertion sites, i.e. indwelling lines, tubes, and drains  - Monitor endotracheal if appropriate and nasal secretions for changes in amount and color  - [place] appropriate cooling/warming therapies per order  - Administer medications as ordered  - Instruct and encourage patient and family to use good hand hygiene technique  - Identify and instruct in appropriate isolation precautions for identified infection/condition  Outcome: Progressing  Goal: Absence of fever/infection during neutropenic period  Description: INTERVENTIONS:  - Monitor WBC    Outcome: Progressing     Problem: SAFETY ADULT  Goal: Maintain or return to baseline ADL function  Description: INTERVENTIONS:  -  Assess patient's ability to carry out ADLs; assess patient's baseline for ADL function and identify physical deficits which impact ability to perform ADLs (bathing, care of mouth/teeth, toileting, grooming, dressing, etc.)  - Assess/evaluate cause of self-care deficits   - Assess range of motion  - Assess patient's mobility; develop plan if impaired  - Assess patient's need for assistive devices and provide as appropriate  - Encourage maximum independence but intervene and supervise when necessary  - Involve family in performance of ADLs  - Assess for home care needs following discharge   - Consider OT consult to assist with ADL evaluation and planning for discharge  - Provide patient education as appropriate  Outcome: Progressing  Goal: Maintains/Returns to pre admission functional level  Description: INTERVENTIONS:  - Perform BMAT or MOVE assessment daily.   - Set and communicate daily mobility goal to care team and patient/family/caregiver. - Collaborate with rehabilitation services on mobility goals if consulted  - Perform Range of Motion 3 times a day. - Reposition patient every 3 hours.   - Dangle patient 3 times a day  - Stand patient 3 times a day  - Ambulate patient 3 times a day  - Out of bed to chair 3 times a day   - Out of bed for meals 3 times a day  - Out of bed for toileting  - Record patient progress and toleration of activity level   Outcome: Progressing  Goal: Patient will remain free of falls  Description: INTERVENTIONS:  - Educate patient/family on patient safety including physical limitations  - Instruct patient to call for assistance with activity   - Consult OT/PT to assist with strengthening/mobility   - Keep Call bell within reach  - Keep bed low and locked with side rails adjusted as appropriate  - Keep care items and personal belongings within reach  - Initiate and maintain comfort rounds  - Make Fall Risk Sign visible to staff  - Offer Toileting every 2 Hours, in advance of need  - Initiate/Maintain bed alarm  - Obtain necessary fall risk management equipment:   - Apply yellow socks and bracelet for high fall risk patients  - Consider moving patient to room near nurses station  Outcome: Progressing     Problem: DISCHARGE PLANNING  Goal: Discharge to home or other facility with appropriate resources  Description: INTERVENTIONS:  - Identify barriers to discharge w/patient and caregiver  - Arrange for needed discharge resources and transportation as appropriate  - Identify discharge learning needs (meds, wound care, etc.)  - Arrange for interpretive services to assist at discharge as needed  - Refer to Case Management Department for coordinating discharge planning if the patient needs post-hospital services based on physician/advanced practitioner order or complex needs related to functional status, cognitive ability, or social support system  Outcome: Progressing     Problem: Knowledge Deficit  Goal: Patient/family/caregiver demonstrates understanding of disease process, treatment plan, medications, and discharge instructions  Description: Complete learning assessment and assess knowledge base.   Interventions:  - Provide teaching at level of understanding  - Provide teaching via preferred learning methods  Outcome: Progressing     Problem: Prexisting or High Potential for Compromised Skin Integrity  Goal: Skin integrity is maintained or improved  Description: INTERVENTIONS:  - Identify patients at risk for skin breakdown  - Assess and monitor skin integrity  - Assess and monitor nutrition and hydration status  - Monitor labs   - Assess for incontinence   - Turn and reposition patient  - Assist with mobility/ambulation  - Relieve pressure over bony prominences  - Avoid friction and shearing  - Provide appropriate hygiene as needed including keeping skin clean and dry  - Evaluate need for skin moisturizer/barrier cream  - Collaborate with interdisciplinary team   - Patient/family teaching  - Consider wound care consult   Outcome: Progressing     Problem: Potential for Falls  Goal: Patient will remain free of falls  Description: INTERVENTIONS:  - Educate patient/family on patient safety including physical limitations  - Instruct patient to call for assistance with activity   - Consult OT/PT to assist with strengthening/mobility   - Keep Call bell within reach  - Keep bed low and locked with side rails adjusted as appropriate  - Keep care items and personal belongings within reach  - Initiate and maintain comfort rounds  - Make Fall Risk Sign visible to staff  - Offer Toileting every 2 Hours, in advance of need  - Initiate/Maintain bed alarm  - Obtain necessary fall risk management equipment: walker  - Apply yellow socks and bracelet for high fall risk patients  - Consider moving patient to room near nurses station  Outcome: Progressing

## 2023-11-13 NOTE — ASSESSMENT & PLAN NOTE
Patient met SIRS criteria with HR > 90, leukocytosis of 12k  Elevated heart rate likely secondary to pain. Leukocytosis reactive due to recent surgery  Afebrile.   Denies any urinary complaints  Leukocytosis resolved

## 2023-11-13 NOTE — PLAN OF CARE
Problem: MOBILITY - ADULT  Goal: Maintain or return to baseline ADL function  Description: INTERVENTIONS:  -  Assess patient's ability to carry out ADLs; assess patient's baseline for ADL function and identify physical deficits which impact ability to perform ADLs (bathing, care of mouth/teeth, toileting, grooming, dressing, etc.)  - Assess/evaluate cause of self-care deficits   - Assess range of motion  - Assess patient's mobility; develop plan if impaired  - Assess patient's need for assistive devices and provide as appropriate  - Encourage maximum independence but intervene and supervise when necessary  - Involve family in performance of ADLs  - Assess for home care needs following discharge   - Consider OT consult to assist with ADL evaluation and planning for discharge  - Provide patient education as appropriate  Outcome: Progressing  Goal: Maintains/Returns to pre admission functional level  Description: INTERVENTIONS:  - Perform BMAT or MOVE assessment daily.   - Set and communicate daily mobility goal to care team and patient/family/caregiver. - Collaborate with rehabilitation services on mobility goals if consulted  - Perform Range of Motion 3 times a day. - Reposition patient every 2 hours.   - Dangle patient 3 times a day  - Stand patient 3 times a day  - Ambulate patient 3 times a day  - Out of bed to chair 3 times a day   - Out of bed for meals 3 times a day  - Out of bed for toileting  - Record patient progress and toleration of activity level   Outcome: Progressing     Problem: PAIN - ADULT  Goal: Verbalizes/displays adequate comfort level or baseline comfort level  Description: Interventions:  - Encourage patient to monitor pain and request assistance  - Assess pain using appropriate pain scale  - Administer analgesics based on type and severity of pain and evaluate response  - Implement non-pharmacological measures as appropriate and evaluate response  - Consider cultural and social influences on pain and pain management  - Notify physician/advanced practitioner if interventions unsuccessful or patient reports new pain  Outcome: Progressing     Problem: INFECTION - ADULT  Goal: Absence or prevention of progression during hospitalization  Description: INTERVENTIONS:  - Assess and monitor for signs and symptoms of infection  - Monitor lab/diagnostic results  - Monitor all insertion sites, i.e. indwelling lines, tubes, and drains  - Monitor endotracheal if appropriate and nasal secretions for changes in amount and color  - [place] appropriate cooling/warming therapies per order  - Administer medications as ordered  - Instruct and encourage patient and family to use good hand hygiene technique  - Identify and instruct in appropriate isolation precautions for identified infection/condition  Outcome: Progressing  Goal: Absence of fever/infection during neutropenic period  Description: INTERVENTIONS:  - Monitor WBC    Outcome: Progressing     Problem: SAFETY ADULT  Goal: Maintain or return to baseline ADL function  Description: INTERVENTIONS:  -  Assess patient's ability to carry out ADLs; assess patient's baseline for ADL function and identify physical deficits which impact ability to perform ADLs (bathing, care of mouth/teeth, toileting, grooming, dressing, etc.)  - Assess/evaluate cause of self-care deficits   - Assess range of motion  - Assess patient's mobility; develop plan if impaired  - Assess patient's need for assistive devices and provide as appropriate  - Encourage maximum independence but intervene and supervise when necessary  - Involve family in performance of ADLs  - Assess for home care needs following discharge   - Consider OT consult to assist with ADL evaluation and planning for discharge  - Provide patient education as appropriate  Outcome: Progressing  Goal: Maintains/Returns to pre admission functional level  Description: INTERVENTIONS:  - Perform BMAT or MOVE assessment daily.   - Set and communicate daily mobility goal to care team and patient/family/caregiver. - Collaborate with rehabilitation services on mobility goals if consulted  - Perform Range of Motion 3 times a day. - Reposition patient every 2 hours.   - Dangle patient 3 times a day  - Stand patient 3 times a day  - Ambulate patient 3 times a day  - Out of bed to chair 3 times a day   - Out of bed for meals 3 times a day  - Out of bed for toileting  - Record patient progress and toleration of activity level   Outcome: Progressing  Goal: Patient will remain free of falls  Description: INTERVENTIONS:  - Educate patient/family on patient safety including physical limitations  - Instruct patient to call for assistance with activity   - Consult OT/PT to assist with strengthening/mobility   - Keep Call bell within reach  - Keep bed low and locked with side rails adjusted as appropriate  - Keep care items and personal belongings within reach  - Initiate and maintain comfort rounds  - Make Fall Risk Sign visible to staff  - Offer Toileting every 2 Hours, in advance of need  - Initiate/Maintain alarm  - Obtain necessary fall risk management equipment:   - Apply yellow socks and bracelet for high fall risk patients  - Consider moving patient to room near nurses station  Outcome: Progressing     Problem: DISCHARGE PLANNING  Goal: Discharge to home or other facility with appropriate resources  Description: INTERVENTIONS:  - Identify barriers to discharge w/patient and caregiver  - Arrange for needed discharge resources and transportation as appropriate  - Identify discharge learning needs (meds, wound care, etc.)  - Arrange for interpretive services to assist at discharge as needed  - Refer to Case Management Department for coordinating discharge planning if the patient needs post-hospital services based on physician/advanced practitioner order or complex needs related to functional status, cognitive ability, or social support system  Outcome: Progressing     Problem: Knowledge Deficit  Goal: Patient/family/caregiver demonstrates understanding of disease process, treatment plan, medications, and discharge instructions  Description: Complete learning assessment and assess knowledge base.   Interventions:  - Provide teaching at level of understanding  - Provide teaching via preferred learning methods  Outcome: Progressing     Problem: Prexisting or High Potential for Compromised Skin Integrity  Goal: Skin integrity is maintained or improved  Description: INTERVENTIONS:  - Identify patients at risk for skin breakdown  - Assess and monitor skin integrity  - Assess and monitor nutrition and hydration status  - Monitor labs   - Assess for incontinence   - Turn and reposition patient  - Assist with mobility/ambulation  - Relieve pressure over bony prominences  - Avoid friction and shearing  - Provide appropriate hygiene as needed including keeping skin clean and dry  - Evaluate need for skin moisturizer/barrier cream  - Collaborate with interdisciplinary team   - Patient/family teaching  - Consider wound care consult   Outcome: Progressing

## 2023-11-13 NOTE — CASE MANAGEMENT
Case Management Discharge Planning Note    Patient name Adeel Wilkins  Location [address] [facility] [place] 222/-30 MRN [id number]  : 1950 Date 2023       Current Admission Date: 2023  Current Admission Diagnosis:Fracture of neck of right femur [facility])   Patient Active Problem List    Diagnosis Date Noted    Acute blood loss anemia 2023    Urine retention 11/10/2023    Fracture of neck of right femur ([address]) 2023    Head injury 10/12/2020    Suture check 10/12/2020    Gout 08/15/2017    HTN (hypertension) 08/15/2017    Abnormal mammogram 2017    Acute arthropathy 2017    Candidal intertrigo 2016    Dyshidrotic eczema 2016    Back pain, chronic 2016    Hip pain 2016    Benign essential hypertension 2015    Chronic sinusitis 2015    Generalized osteoarthritis 2015      LOS (days): 4  Geometric Mean LOS (GMLOS) (days): 2.80  Days to GMLOS:-0.9     OBJECTIVE:  Risk of Unplanned Readmission Score: 8.75         Current admission status: Inpatient   Preferred Pharmacy:   [address] [place] [address] #[id number] [address] [address]  Phone: [phone] Fax: [phone]    Primary Care Provider: Grant Clemens DO    Primary Insurance: MEDICARE  Secondary Insurance:  FOR LIFE    DISCHARGE DETAILS:        Therapy is recommending str for patient. CM met with pt and her dtr in pt's room and pt is unsure about going to [facility]. Pt prefers HH through [facility] which she had in the past. Pt was agreeabe to CM making referrals to [facility]) and [facility] but wanted to discuss options with her spouse before deciding. CM left message for spouse with contact information. CM made referrals in [place].

## 2023-11-13 NOTE — ASSESSMENT & PLAN NOTE
Hemoglobin 14.5 initially down to 10.2   Stable at 10.0 this morning  No evidence of active bleeding

## 2023-11-13 NOTE — PROGRESS NOTES
Patient:    MRN:  [id number]    Aidin Request ID:  [id number]    Level of care reserved:  [address]    Partner Reserved:  [facility]- [facility], [place],  [facility] [phone]    Clinical needs requested:    Geography searched:  [id number]    Start of Service:    Request sent:  12:48pm EST on 11/13/2023 by Anahi Quinones    Partner reserved:  1:52pm EST on 11/13/2023 by Anahi Quinones    Choice list shared:  1:52pm EST on 11/13/2023 by Anahi Quinones

## 2023-11-13 NOTE — PROGRESS NOTES
Progress Note - Orthopedics   Guru Arambula 68 y.o. female MRN: [id number]  Unit/Bed#: -01      Subjective:    68 y. o.female POD 2 right total hip revision for periprosthetic fracture. No acute overnight events, no new complaints. She denies have any pain at this time, but states she feels discomfort when trying to move in bed. She denies fevers, chills, headaches, CP, SOB, N/V, or numbness or tingling.      Labs:  0   Lab Value Date/Time    HCT 29.3 (L) 11/13/2023 0455    HCT 29.0 (L) 11/12/2023 0518    HCT 41.2 11/11/2023 0602    HGB 10.0 (L) 11/13/2023 0455    HGB 10.2 (L) 11/12/2023 0518    HGB 14.5 11/11/2023 0602    INR 1.07 11/09/2023 2233    WBC 9.40 11/13/2023 0455    WBC 12.53 (H) 11/12/2023 0518    WBC 8.63 11/11/2023 0602    ESR 26 (H) 03/29/2017 1404       Meds:    Current Facility-Administered Medications:     acetaminophen (TYLENOL) tablet 975 mg, 975 mg, Oral, Q8H [facility], Baylee Donohue PA-C, 975 mg at 11/13/23 0500    allopurinol (ZYLOPRIM) tablet 100 mg, 100 mg, Oral, Daily, Baylee Donohue PA-C, 100 mg at 11/12/23 8032    aspirin chewable tablet 81 mg, 81 mg, Oral, BID, Baylee Donohue PA-C, 81 mg at 11/12/23 2058    doxycycline hyclate (VIBRAMYCIN) capsule 100 mg, 100 mg, Oral, Q12H [facility], Baylee Donohue PA-C, 100 mg at 11/12/23 2058    hydrALAZINE (APRESOLINE) injection 5 mg, 5 mg, Intravenous, Q6H PRN, Caty Ahumada PA-C, 5 mg at 11/10/23 2105    HYDROmorphone HCl (DILAUDID) injection 0.2 mg, 0.2 mg, Intravenous, Q4H PRN, Caty Ahumada PA-C, 0.2 mg at 11/11/23 0610    iohexol (OMNIPAQUE) 350 MG/ML injection (MULTI-DOSE) 100 mL, 100 mL, Intravenous, Once in imaging, Caty Ahumada PA-C    methocarbamol (ROBAXIN) tablet 500 mg, 500 mg, Oral, Q6H PRN, Baylee Donohue PA-C, 500 mg at 11/09/23 2256    nicotine (NICODERM CQ) 21 mg/24 hr TD 24 hr patch 1 patch, 1 patch, Transdermal, Daily PRN, Baylee Donohue PA-C    OLANZapine (ZyPREXA ZYDIS) dispersible tablet 5 mg, 5 mg, Oral, Q3H PRN, Juan José Cuello PA-C    ondansetron [facility]) injection 4 mg, 4 mg, Intravenous, Q6H PRN, Baylee Donohue PA-C, 4 mg at 11/11/23 1019    oxyCODONE (ROXICODONE) split tablet 2.5 mg, 2.5 mg, Oral, Q6H PRN, 2.5 mg at 11/10/23 2046 **OR** oxyCODONE (ROXICODONE) IR tablet 5 mg, 5 mg, Oral, Q6H PRN, Yolis Madera PA-C, 5 mg at 11/12/23 1702    senna (SENOKOT) tablet 8.6 mg, 1 tablet, Oral, HS PRN, Baylee Donohue PA-C    Blood Culture:   No results found for: "BLOODCX"    Wound Culture:   No results found for: "WOUNDCULT"    Ins and Outs:  No intake/output data recorded. Physical:  Vitals:    11/13/23 0738   BP: 116/81   Pulse: 90   Resp: 16   Temp:    SpO2: 96%     Musculoskeletal: right Lower Extremity  Visible skin is ecchymotic without erythema. Dressing C/D/I  TTP over the hip  Sensation intact over the toes  Motor intact to +FHL/EHL, +ankle dorsi/plantar flexion  Digits warm and well perfused  Capillary refill < 2 seconds    Assessment:    68 y. o.female POD 2 right total hip revision for periprosthetic fracture with Dr. Emanuel Blum on 11/11/2023. Plan:  WBAT RLE with abduction precautions - no resisted ABduction and no passive ADduction  Asa 81 BID for 4 weeks  Ancef x 24 hours; doxycycline 100 mg BID for 2 weeks  Preliminary culture results are negative. Ortho will follow-up on results. Will monitor for ABLA and recommend IVF/prbc as indicated for greater than 2 gram Hgb drop or Hgb < 7. Hgb this morning is 10.0. No signs of active bleeding in the operative extremity. PT/OT  Pain control  Medical co-morbidities are being managed per primary team  Dispo: Ortho will follow  DC planning.       Katelynn Montero PA-C

## 2023-11-13 NOTE — CASE MANAGEMENT
Case Management Discharge Planning Note    Patient name Hardeep Healy  Location [address] [facility] 222/-78 MRN [id number]  : 1950 Date 2023       Current Admission Date: 2023  Current Admission Diagnosis:Benign essential hypertension   Patient Active Problem List    Diagnosis Date Noted    Head injury 10/12/2020    Suture check 10/12/2020    Gout 08/15/2017    HTN (hypertension) 08/15/2017    Abnormal mammogram 2017    Acute arthropathy 2017    Candidal intertrigo 2016    Dyshidrotic eczema 2016    Back pain, chronic 2016    Hip pain 2016    Benign essential hypertension 2015    Chronic sinusitis 2015    Generalized osteoarthritis 2015      LOS (days): 4  Geometric Mean LOS (GMLOS) (days): 2.80  Days to GMLOS:-0.9     OBJECTIVE:  Risk of Unplanned Readmission Score: 8.75         Current admission status: Inpatient   Preferred Pharmacy:   [address] [place] [address] #[id number] [address]  Phone: [phone] Fax: [phone]    Primary Care Provider: Jose Ball DO    Primary Insurance: MEDICARE  Secondary Insurance:  FOR LIFE    DISCHARGE DETAILS:        Spoke with spouse, Mirta Beasley and he is in agreement with pt for her to come home with [facility]. He prefers [facility]. [facility] reserved in [place]. IMM reviewed with patient's caregiver, patient's caregiver agrees with discharge determination.        Requested [address] [facility]         Is the patient interested in [address] at discharge?: Yes  [facility] requested[de-identified] Occupational Therapy, Physical [address] [facility] Name[de-identified] Noel Provider[de-identified] PCP  Home Health Services Needed[de-identified] Evaluate Functional Status and Safety, Gait/ADL Training, Strengthening/Theraputic Exercises to Improve Function  Homebound Criteria Met[de-identified] Uses an Assist Device (i.e. cane, walker, etc)  Supporting Clincal Findings[de-identified] Limited Endurance         Other Referral/Resources/Interventions Provided:  Interventions: [facility] AT [facility]         Treatment Team Recommendation: Home with [address]  Discharge Destination Plan[de-identified] Home with [address] at Discharge : Family                             IMM Given (Date):: 11/13/23 (100p)  IMM Given to[de-identified] Family  Family notified[de-identified] Spouse, Yuliana Soriano

## 2023-11-13 NOTE — ASSESSMENT & PLAN NOTE
Intermittent elevated BP likely due to pain, now stable  Not on antihypertensives as outpt  Optimize pain control  Hydralazine PRN

## 2023-11-13 NOTE — ASSESSMENT & PLAN NOTE
S/p mechanical fall on to right side with resultant right femoral neck fracture surrounding present hardware  CT LE showed status post total right hip arthroplasty with a proximal right femoral diaphysis mildly displaced periprosthetic fracture.    POD 2 for right hip revision periprosthetic fracture  Continue pain control  PT/OT eval for disposition planning

## 2023-11-13 NOTE — PLAN OF CARE
Problem: OCCUPATIONAL THERAPY ADULT  Goal: Performs self-care activities at highest level of function for planned discharge setting. See evaluation for individualized goals. Description:   Outcome: Progressing  Note: Limitation: Decreased ADL status, Decreased Safe judgement during ADL, Decreased cognition, Decreased endurance, Decreased self-care trans, Decreased high-level ADLs  Prognosis: Good  Assessment: Pt is a 68 y.o. female seen for OT evaluation at [facility], admitted 11/9/2023 w/ Fracture of neck of right femur ([address] OT completed extensive review of pt's medical and social history. Comorbidities affecting pt's functional performance at time of assessment include: HTN, gout, urine retention, anemia, chronic back pain, osteoarthritis, HTN. Personal factors affecting pt at time of IE include:limited home support, behavioral pattern, difficulty performing ADLS, difficulty performing IADLS , limited insight into deficits, decreased initiation and engagement , and health management . Prior to admission, pt was living in ranch with ramped entrance, with spouse, and was independent with ADL/IADL and driving. Upon evaluation, pt presents to OT below baseline due to the following performance deficits: weakness, decreased strength, decreased balance, decreased tolerance, impaired memory, impaired sequencing, impaired problem solving, impulsivity, and decreased safety awareness. Pt to benefit from continued skilled OT tx while in the hospital to address deficits as defined above and maximize level of functional independence w ADL's and functional mobility. Occupational Performance areas to address include: grooming, bathing/shower, toilet hygiene, dressing, functional mobility, and functional transfers, bed mobility . The patient's raw score on the AM-PAC Daily Activity inpatient short form is 16, standardized score is 35.96, less than 39.4.  Patients at this level are likely to benefit from DC to post-acute rehabilitation services. Based on findings, pt is of high complexity, due to medical comorbidities. Pt seen as a co-eval with PT due to the patient's co-morbidities, clinically unstable presentation, and present impairments which are a regression from the patient's baseline. At this time, OT recommendations at time of discharge are level 2.      Rehab Resource Intensity Level, OT: II (Moderate Resource Intensity)

## 2023-11-14 ENCOUNTER — TELEPHONE (OUTPATIENT)
Age: 73
End: 2023-11-14

## 2023-11-14 ENCOUNTER — TRANSITIONAL CARE MANAGEMENT (OUTPATIENT)
Dept: FAMILY MEDICINE CLINIC | Facility: CLINIC | Age: 73
End: 2023-11-14

## 2023-11-14 ENCOUNTER — HOME CARE VISIT (OUTPATIENT)
Dept: HOME HEALTH SERVICES | Facility: HOME HEALTHCARE | Age: 73
End: 2023-11-14
Payer: MEDICARE

## 2023-11-14 ENCOUNTER — TELEPHONE (OUTPATIENT)
Dept: FAMILY MEDICINE CLINIC | Facility: CLINIC | Age: 73
End: 2023-11-14

## 2023-11-14 VITALS — SYSTOLIC BLOOD PRESSURE: 128 MMHG | HEART RATE: 89 BPM | OXYGEN SATURATION: 97 % | DIASTOLIC BLOOD PRESSURE: 82 MMHG

## 2023-11-14 DIAGNOSIS — Z96.641 HISTORY OF REVISION OF TOTAL REPLACEMENT OF RIGHT HIP JOINT: ICD-10-CM

## 2023-11-14 DIAGNOSIS — S72.001A CLOSED FRACTURE OF PROXIMAL END OF RIGHT FEMUR, INITIAL ENCOUNTER (HCC): ICD-10-CM

## 2023-11-14 LAB
BACTERIA TISS AEROBE CULT: NO GROWTH
GRAM STN SPEC: NORMAL
MYCOBACTERIUM SPEC CULT: NORMAL
RHODAMINE-AURAMINE STN SPEC: NORMAL

## 2023-11-14 PROCEDURE — G0151 HHCP-SERV OF PT,EA 15 MIN: HCPCS

## 2023-11-14 PROCEDURE — 10330081 VN NO-PAY CLAIM PROCEDURE

## 2023-11-14 PROCEDURE — 400013 VN SOC

## 2023-11-14 RX ORDER — DOXYCYCLINE 100 MG/1
100 TABLET ORAL 2 TIMES DAILY
Qty: 28 TABLET | Refills: 0 | Status: SHIPPED | OUTPATIENT
Start: 2023-11-14 | End: 2023-11-28

## 2023-11-14 RX ORDER — OXYCODONE HYDROCHLORIDE 5 MG/1
5 TABLET ORAL EVERY 6 HOURS PRN
Qty: 30 TABLET | Refills: 0 | Status: SHIPPED | OUTPATIENT
Start: 2023-11-14 | End: 2023-11-24

## 2023-11-14 NOTE — TELEPHONE ENCOUNTER
Spoke with patient and her  Allie Carmona regarding hospitalization and scheduling TCM. They are interested in a virtual visit but are unable to do it until later in the day. Ok to schedule as virtual TCM in regular spot (11:15) and  have the appointment later in the day at your convenience? Looking at Tuesday of next week.

## 2023-11-15 ENCOUNTER — TELEPHONE (OUTPATIENT)
Age: 73
End: 2023-11-15

## 2023-11-15 DIAGNOSIS — Z96.641 HISTORY OF REVISION OF TOTAL REPLACEMENT OF RIGHT HIP JOINT: Primary | ICD-10-CM

## 2023-11-15 LAB — BACTERIA SPEC ANAEROBE CULT: NO GROWTH

## 2023-11-15 RX ORDER — ONDANSETRON 4 MG/1
4 TABLET, ORALLY DISINTEGRATING ORAL EVERY 6 HOURS PRN
Qty: 20 TABLET | Refills: 0 | Status: SHIPPED | OUTPATIENT
Start: 2023-11-15

## 2023-11-15 RX ORDER — HYDROCODONE BITARTRATE AND ACETAMINOPHEN 5; 325 MG/1; MG/1
1 TABLET ORAL EVERY 6 HOURS PRN
Qty: 42 TABLET | Refills: 0 | Status: SHIPPED | OUTPATIENT
Start: 2023-11-15

## 2023-11-15 NOTE — TELEPHONE ENCOUNTER
Norco sent in for her to take instead of oxy. Zofran also sent to pharmacy,. Please advise the patient, thank you.

## 2023-11-15 NOTE — TELEPHONE ENCOUNTER
Caller: Gibson Khan    Doctor: Walda Litten    Reason for call: Patient  is throwing up from oxycodone.   Is there something else she can take for pain  Please advise  Call back#: [phone]

## 2023-11-16 ENCOUNTER — HOME CARE VISIT (OUTPATIENT)
Dept: HOME HEALTH SERVICES | Facility: HOME HEALTHCARE | Age: 73
End: 2023-11-16
Payer: MEDICARE

## 2023-11-16 VITALS — OXYGEN SATURATION: 97 % | HEART RATE: 86 BPM

## 2023-11-16 PROCEDURE — G0151 HHCP-SERV OF PT,EA 15 MIN: HCPCS

## 2023-11-17 ENCOUNTER — HOME CARE VISIT (OUTPATIENT)
Dept: HOME HEALTH SERVICES | Facility: HOME HEALTHCARE | Age: 73
End: 2023-11-17
Payer: MEDICARE

## 2023-11-17 PROCEDURE — G0321 AUDIO-ONLY HHS: HCPCS

## 2023-11-20 LAB
FUNGUS SPEC CULT: NORMAL

## 2023-11-21 ENCOUNTER — TELEPHONE (OUTPATIENT)
Dept: FAMILY MEDICINE CLINIC | Facility: CLINIC | Age: 73
End: 2023-11-21

## 2023-11-21 LAB
MYCOBACTERIUM SPEC CULT: NORMAL
RHODAMINE-AURAMINE STN SPEC: NORMAL

## 2023-11-22 ENCOUNTER — HOME CARE VISIT (OUTPATIENT)
Dept: HOME HEALTH SERVICES | Facility: HOME HEALTHCARE | Age: 73
End: 2023-11-22
Payer: MEDICARE

## 2023-11-22 VITALS — HEART RATE: 89 BPM | OXYGEN SATURATION: 96 %

## 2023-11-22 PROCEDURE — G0151 HHCP-SERV OF PT,EA 15 MIN: HCPCS

## 2023-11-24 ENCOUNTER — TELEPHONE (OUTPATIENT)
Dept: OBGYN CLINIC | Facility: HOSPITAL | Age: 73
End: 2023-11-24

## 2023-11-24 ENCOUNTER — HOME CARE VISIT (OUTPATIENT)
Dept: HOME HEALTH SERVICES | Facility: HOME HEALTHCARE | Age: 73
End: 2023-11-24
Payer: MEDICARE

## 2023-11-24 DIAGNOSIS — Z96.641 HISTORY OF REVISION OF TOTAL REPLACEMENT OF RIGHT HIP JOINT: Primary | ICD-10-CM

## 2023-11-24 PROCEDURE — G0151 HHCP-SERV OF PT,EA 15 MIN: HCPCS

## 2023-11-24 NOTE — TELEPHONE ENCOUNTER
Caller: Patient    Doctor: Ivan Live    Reason for call: Patient stated the Hydrocodone that was prescribed is upsetting her stomach and giving her anxiety, would like something different. Patient is also having trouble walking and wants to know if a nurse could to her home for her follow up appointment and remove the stiches. Please advise.     Call back#: [phone]

## 2023-11-27 ENCOUNTER — TELEPHONE (OUTPATIENT)
Age: 73
End: 2023-11-27

## 2023-11-27 LAB
FUNGUS SPEC CULT: NORMAL

## 2023-11-27 RX ORDER — TRAMADOL HYDROCHLORIDE 50 MG/1
50 TABLET ORAL EVERY 6 HOURS PRN
Qty: 42 TABLET | Refills: 0 | Status: SHIPPED | OUTPATIENT
Start: 2023-11-27

## 2023-11-27 NOTE — TELEPHONE ENCOUNTER
Caller: Eleuterio Lopez     Doctor: Jaci Bond     Reason for call: Eleuterio Jessica is calling trying to get his wife schedule for a post op ,Next available isn't till January 5 .  does not know what to wait that long to have his wife seen . Eleuterio Lopez is not to happy with the scheduling and few other things that has happen . Please schedule patient for a sooner date and  afternoon appointment  at the [facility] office . Please advise .     Call back#: [phone]

## 2023-11-27 NOTE — TELEPHONE ENCOUNTER
Tramadol sent to take instead of the hydrocodone. Unfortunately she must come into the office for her follow up post op visit.

## 2023-11-28 ENCOUNTER — TELEPHONE (OUTPATIENT)
Dept: OBGYN CLINIC | Facility: CLINIC | Age: 73
End: 2023-11-28

## 2023-11-28 ENCOUNTER — HOME CARE VISIT (OUTPATIENT)
Dept: HOME HEALTH SERVICES | Facility: HOME HEALTHCARE | Age: 73
End: 2023-11-28
Payer: MEDICARE

## 2023-11-28 VITALS — OXYGEN SATURATION: 98 % | HEART RATE: 100 BPM

## 2023-11-28 LAB
MYCOBACTERIUM SPEC CULT: NORMAL
RHODAMINE-AURAMINE STN SPEC: NORMAL

## 2023-11-28 PROCEDURE — G0151 HHCP-SERV OF PT,EA 15 MIN: HCPCS

## 2023-11-30 ENCOUNTER — HOME CARE VISIT (OUTPATIENT)
Dept: HOME HEALTH SERVICES | Facility: HOME HEALTHCARE | Age: 73
End: 2023-11-30
Payer: MEDICARE

## 2023-11-30 VITALS — OXYGEN SATURATION: 99 % | HEART RATE: 100 BPM

## 2023-11-30 PROCEDURE — G0151 HHCP-SERV OF PT,EA 15 MIN: HCPCS

## 2023-12-04 LAB
FUNGUS SPEC CULT: NORMAL

## 2023-12-05 LAB
MYCOBACTERIUM SPEC CULT: NORMAL
RHODAMINE-AURAMINE STN SPEC: NORMAL

## 2023-12-06 ENCOUNTER — HOME CARE VISIT (OUTPATIENT)
Dept: HOME HEALTH SERVICES | Facility: HOME HEALTHCARE | Age: 73
End: 2023-12-06
Payer: MEDICARE

## 2023-12-06 PROCEDURE — G0151 HHCP-SERV OF PT,EA 15 MIN: HCPCS

## 2023-12-07 ENCOUNTER — HOME CARE VISIT (OUTPATIENT)
Dept: HOME HEALTH SERVICES | Facility: HOME HEALTHCARE | Age: 73
End: 2023-12-07
Payer: MEDICARE

## 2023-12-08 ENCOUNTER — APPOINTMENT (OUTPATIENT)
Dept: RADIOLOGY | Facility: CLINIC | Age: 73
End: 2023-12-08
Payer: MEDICARE

## 2023-12-08 ENCOUNTER — OFFICE VISIT (OUTPATIENT)
Dept: OBGYN CLINIC | Facility: CLINIC | Age: 73
End: 2023-12-08

## 2023-12-08 VITALS
HEART RATE: 112 BPM | WEIGHT: 137 LBS | DIASTOLIC BLOOD PRESSURE: 88 MMHG | HEIGHT: 65 IN | SYSTOLIC BLOOD PRESSURE: 120 MMHG | BODY MASS INDEX: 22.82 KG/M2

## 2023-12-08 DIAGNOSIS — S72.001A CLOSED FRACTURE OF PROXIMAL END OF RIGHT FEMUR, INITIAL ENCOUNTER (HCC): ICD-10-CM

## 2023-12-08 DIAGNOSIS — Z96.641 HISTORY OF REVISION OF TOTAL REPLACEMENT OF RIGHT HIP JOINT: Primary | ICD-10-CM

## 2023-12-08 PROCEDURE — 99024 POSTOP FOLLOW-UP VISIT: CPT | Performed by: STUDENT IN AN ORGANIZED HEALTH CARE EDUCATION/TRAINING PROGRAM

## 2023-12-08 PROCEDURE — 73502 X-RAY EXAM HIP UNI 2-3 VIEWS: CPT

## 2023-12-08 NOTE — PROGRESS NOTES
Subjective: Patient seen and examined. Pain controlled overall. She does notice a lot of swelling of the thigh and into the calf. Progressing well, she is ambulating with a walker. Incision without drainage, mepilex dressing intact. Denies fevers or chills    Physical Exam:  Incision: CDI, no erythema or drainage noted. ROM: normal post operative without pain  Calf soft, mildly tender, no erythema. 5/5 IP/Q/HS/TA/GS, 2+ DP/PT, SILT DP/SP/S/S/TN    XR Right hip: s/p cone-conical revision total hip arthroplasty, IT fracture healing well with callous formation. No hardware failure or dislocation. Assessment/Plan:  67 y/o female 4 weeks s/p Right MELYSSA Revision with ORIF femur from 11/11/23.    - continue multi-modal pain control   - Weight bearing status: WBAT RLE  - DVT ppx: aspirin 81mg twice daily x 4 weeks  - Incision care:  May shower, do not scrub or submerge incision.  - PT/OT, restrictions of resisted abduction and passive adduction.  - F/U 4-5 weeks      Scribe Attestation      I,:  Fany Richmond PA-C am acting as a scribe while in the presence of the attending physician.:       I,:  David Morris, DO personally performed the services described in this documentation    as scribed in my presence.:

## 2023-12-09 ENCOUNTER — HOME CARE VISIT (OUTPATIENT)
Dept: HOME HEALTH SERVICES | Facility: HOME HEALTHCARE | Age: 73
End: 2023-12-09
Payer: MEDICARE

## 2023-12-09 PROCEDURE — G0151 HHCP-SERV OF PT,EA 15 MIN: HCPCS

## 2023-12-11 ENCOUNTER — HOME CARE VISIT (OUTPATIENT)
Dept: HOME HEALTH SERVICES | Facility: HOME HEALTHCARE | Age: 73
End: 2023-12-11
Payer: MEDICARE

## 2023-12-11 VITALS — HEART RATE: 88 BPM | OXYGEN SATURATION: 97 %

## 2023-12-11 VITALS — OXYGEN SATURATION: 97 % | HEART RATE: 89 BPM

## 2023-12-11 LAB
FUNGUS SPEC CULT: NORMAL

## 2023-12-11 PROCEDURE — G0151 HHCP-SERV OF PT,EA 15 MIN: HCPCS

## 2023-12-12 LAB
MYCOBACTERIUM SPEC CULT: NORMAL
RHODAMINE-AURAMINE STN SPEC: NORMAL

## 2023-12-13 ENCOUNTER — HOME CARE VISIT (OUTPATIENT)
Dept: HOME HEALTH SERVICES | Facility: HOME HEALTHCARE | Age: 73
End: 2023-12-13
Payer: MEDICARE

## 2023-12-13 VITALS — OXYGEN SATURATION: 95 % | HEART RATE: 91 BPM

## 2023-12-13 PROCEDURE — G0151 HHCP-SERV OF PT,EA 15 MIN: HCPCS

## 2023-12-18 ENCOUNTER — HOME CARE VISIT (OUTPATIENT)
Dept: HOME HEALTH SERVICES | Facility: HOME HEALTHCARE | Age: 73
End: 2023-12-18
Payer: MEDICARE

## 2023-12-18 VITALS — HEART RATE: 76 BPM | OXYGEN SATURATION: 98 %

## 2023-12-18 PROCEDURE — G0151 HHCP-SERV OF PT,EA 15 MIN: HCPCS

## 2023-12-19 LAB
MYCOBACTERIUM SPEC CULT: NORMAL
RHODAMINE-AURAMINE STN SPEC: NORMAL

## 2023-12-21 ENCOUNTER — HOME CARE VISIT (OUTPATIENT)
Dept: HOME HEALTH SERVICES | Facility: HOME HEALTHCARE | Age: 73
End: 2023-12-21
Payer: MEDICARE

## 2023-12-21 PROCEDURE — G0151 HHCP-SERV OF PT,EA 15 MIN: HCPCS

## 2023-12-22 VITALS — HEART RATE: 78 BPM | OXYGEN SATURATION: 98 %

## 2023-12-27 ENCOUNTER — HOME CARE VISIT (OUTPATIENT)
Dept: HOME HEALTH SERVICES | Facility: HOME HEALTHCARE | Age: 73
End: 2023-12-27
Payer: MEDICARE

## 2023-12-27 VITALS — OXYGEN SATURATION: 99 % | HEART RATE: 82 BPM

## 2023-12-27 LAB
MYCOBACTERIUM SPEC CULT: NORMAL
RHODAMINE-AURAMINE STN SPEC: NORMAL

## 2023-12-27 PROCEDURE — G0151 HHCP-SERV OF PT,EA 15 MIN: HCPCS

## 2024-01-04 ENCOUNTER — HOME CARE VISIT (OUTPATIENT)
Dept: HOME HEALTH SERVICES | Facility: HOME HEALTHCARE | Age: 74
End: 2024-01-04
Payer: MEDICARE

## 2024-01-04 VITALS — HEART RATE: 90 BPM | OXYGEN SATURATION: 99 %

## 2024-01-04 PROCEDURE — G0151 HHCP-SERV OF PT,EA 15 MIN: HCPCS

## 2024-01-08 DIAGNOSIS — M10.9 GOUT, UNSPECIFIED CAUSE, UNSPECIFIED CHRONICITY, UNSPECIFIED SITE: ICD-10-CM

## 2024-01-08 DIAGNOSIS — M15.9 PRIMARY OSTEOARTHRITIS INVOLVING MULTIPLE JOINTS: ICD-10-CM

## 2024-01-08 RX ORDER — ALLOPURINOL 100 MG/1
100 TABLET ORAL DAILY
Qty: 90 TABLET | Refills: 1 | Status: SHIPPED | OUTPATIENT
Start: 2024-01-08

## 2024-01-08 RX ORDER — CELECOXIB 100 MG/1
100 CAPSULE ORAL 2 TIMES DAILY
Qty: 180 CAPSULE | Refills: 1 | Status: SHIPPED | OUTPATIENT
Start: 2024-01-08

## 2024-01-12 ENCOUNTER — APPOINTMENT (OUTPATIENT)
Dept: RADIOLOGY | Facility: CLINIC | Age: 74
End: 2024-01-12
Payer: MEDICARE

## 2024-01-12 ENCOUNTER — OFFICE VISIT (OUTPATIENT)
Dept: OBGYN CLINIC | Facility: CLINIC | Age: 74
End: 2024-01-12

## 2024-01-12 VITALS
HEIGHT: 65 IN | HEART RATE: 80 BPM | WEIGHT: 126 LBS | DIASTOLIC BLOOD PRESSURE: 80 MMHG | BODY MASS INDEX: 20.99 KG/M2 | SYSTOLIC BLOOD PRESSURE: 130 MMHG

## 2024-01-12 DIAGNOSIS — S72.001A CLOSED FRACTURE OF PROXIMAL END OF RIGHT FEMUR, INITIAL ENCOUNTER (HCC): ICD-10-CM

## 2024-01-12 DIAGNOSIS — Z47.89 AFTERCARE FOLLOWING SURGERY OF THE MUSCULOSKELETAL SYSTEM: Primary | ICD-10-CM

## 2024-01-12 PROCEDURE — 99024 POSTOP FOLLOW-UP VISIT: CPT | Performed by: STUDENT IN AN ORGANIZED HEALTH CARE EDUCATION/TRAINING PROGRAM

## 2024-01-12 PROCEDURE — 73502 X-RAY EXAM HIP UNI 2-3 VIEWS: CPT

## 2024-01-12 NOTE — PROGRESS NOTES
"Subjective: 73 y.o. female presents to the office 9 weeks s/p revision right total hip arthroplasty with ORIF femur performed on 11/11/2023. Patient seen and examined. Pain controlled. Has occasional \"tinges\" of pinching pain over incision and notes mild lateral hip pain. Progressing well, ambulates with the assistance of a walker. Incision without drainage. Denies fevers or chills    Physical Exam:  Incision: well healed  ROM: full without pain  5/5 IP/Q/HS/TA/GS, 2+ DP/PT, SILT DP/SP/S/S/TN    XR right hip: s/p cone-conical revision total hip arthroplasty, IT fracture healing well with callous formation. No hardware failure or dislocation     Assessment/Plan:  9 weeks s/p revision right total hip arthroplasty with ORIF femur performed on 11/11/2023    - continue multi-modal pain control   - Weight bearing status: as tolerated  - DVT ppx: completed  - Continue PT/OT   - F/U in 6 weeks    Scribe Attestation      I,:  Teena Martinez am acting as a scribe while in the presence of the attending physician.:       I,:  Seamus Dash DO personally performed the services described in this documentation    as scribed in my presence.:             "

## 2024-01-29 ENCOUNTER — TELEPHONE (OUTPATIENT)
Age: 74
End: 2024-01-29

## 2024-01-29 DIAGNOSIS — Z47.89 AFTERCARE FOLLOWING SURGERY OF THE MUSCULOSKELETAL SYSTEM: Primary | ICD-10-CM

## 2024-01-29 NOTE — TELEPHONE ENCOUNTER
Caller: [facility]    Doctor: Hardy    Reason for call: Alberto Horton PT is calling to request an updated PT script. Also, does patiwnt have restrictions such as no driving? Please fax to [phone]    Call back#: [phone]

## 2024-02-14 ENCOUNTER — TELEPHONE (OUTPATIENT)
Age: 74
End: 2024-02-14

## 2024-02-14 DIAGNOSIS — Z47.89 AFTERCARE FOLLOWING SURGERY OF THE MUSCULOSKELETAL SYSTEM: Primary | ICD-10-CM

## 2024-02-14 NOTE — TELEPHONE ENCOUNTER
Caller: Patient    Doctor: Hardy    Reason for call: Patient would like to continue with Physical Therapy and is asking for an updated script. Alberto Horton PT. Please fax: [phone]    Call back#: [phone]

## 2024-03-01 ENCOUNTER — OFFICE VISIT (OUTPATIENT)
Dept: OBGYN CLINIC | Facility: CLINIC | Age: 74
End: 2024-03-01
Payer: MEDICARE

## 2024-03-01 VITALS
HEART RATE: 109 BPM | WEIGHT: 126 LBS | HEIGHT: 65 IN | DIASTOLIC BLOOD PRESSURE: 82 MMHG | BODY MASS INDEX: 20.99 KG/M2 | SYSTOLIC BLOOD PRESSURE: 132 MMHG

## 2024-03-01 DIAGNOSIS — S72.001A CLOSED FRACTURE OF PROXIMAL END OF RIGHT FEMUR, INITIAL ENCOUNTER (HCC): ICD-10-CM

## 2024-03-01 DIAGNOSIS — Z47.89 AFTERCARE FOLLOWING SURGERY OF THE MUSCULOSKELETAL SYSTEM: Primary | ICD-10-CM

## 2024-03-01 DIAGNOSIS — M25.511 RIGHT SHOULDER PAIN, UNSPECIFIED CHRONICITY: ICD-10-CM

## 2024-03-01 PROCEDURE — 99213 OFFICE O/P EST LOW 20 MIN: CPT | Performed by: STUDENT IN AN ORGANIZED HEALTH CARE EDUCATION/TRAINING PROGRAM

## 2024-03-01 NOTE — PROGRESS NOTES
"Hip Follow up Office Note    Assessment:     1. Aftercare following surgery of the musculoskeletal system    2. Closed fracture of proximal end of right femur, initial encounter ([facility])    3. Right shoulder pain, unspecified chronicity        Plan:     Problem List Items Addressed This Visit    None  Visit Diagnoses       Aftercare following surgery of the musculoskeletal system    -  Primary    Relevant Orders    Ambulatory Referral to Physical Therapy    Closed fracture of proximal end of right femur, initial encounter (HCC)        Relevant Orders    Ambulatory Referral to Physical Therapy    Right shoulder pain, unspecified chronicity        Relevant Orders    Ambulatory Referral to Orthopedic Surgery             Plan:  73 y.o. female doing well 15 weeks s/p revision right total hip arthroplasty with ORIF femur performed on 11/11/2023. She was encouraged to continue physical therapy, script provided. Referral provided to Dr. Gomez for evaluation of right shoulder pain. Follow up in 8 months for annual check. New x-rays upon arrival       Subjective:     Patient ID: Valeria Alba is a 73 y.o. female.    Chief Complaint:  HPI:  Patient is a 73 y.o. female here today for post op evaluation s/p revision right total hip arthroplasty with ORIF femur performed on 11/11/2023. She reports doing well overall. She ambulates with the assistance of a walker. She has been making progress with physical therapy. She also notes right shoulder pain.    Allergy:  Allergies   Allergen Reactions    Codeine GI Intolerance    Medical Tape Other (See Comments)     \"redness\"     Medications:  all current active meds have been reviewed  Past Medical History:  History reviewed. No pertinent past medical history.  Past Surgical History:  Past Surgical History:   Procedure Laterality Date    HIP ARTHROPLASTY Right 11/11/2023    Procedure: ARTHROPLASTY HIP TOTAL REVISION, ORIF Femur;  Surgeon: Seamus Dash DO;  Location:  MAIN OR; " " Service: Orthopedics    REPLACEMENT TOTAL KNEE       Family History:  Family History   Problem Relation Age of Onset    Hypertension Mother     Aortic aneurysm Family         abdominal    Brain cancer Family     Hypertension Family     Stroke Family         snydrome     Social History:  Social History     Substance and Sexual Activity   Alcohol Use Not Currently     Social History     Substance and Sexual Activity   Drug Use Never     Social History     Tobacco Use   Smoking Status Every Day    Current packs/day: 1.00    Average packs/day: 1 pack/day for 50.0 years (50.0 ttl pk-yrs)    Types: Cigarettes   Smokeless Tobacco Never           ROS:  General: Per HPI  Skin: Negative, except if noted below  HEENT: Negative  Respiratory: Negative  Cardiovascular: Negative  Gastrointestinal: Negative  Urinary: Negative  Vascular: Negative  Musculoskeletal: Positive per HPI   Neurologic: Positive per HPI  Endocrine: Negative    Objective:  BP Readings from Last 1 Encounters:   03/01/24 132/82      Wt Readings from Last 1 Encounters:   03/01/24 57.2 kg (126 lb)        Respiratory:   non-labored respirations    Lymphatics:  no palpable lymph nodes    Gait and Station:   Ambulates with the assistance of a walker    Neurologic:   Alert and oriented times 3  Patient with normal sensation except as noted below  Deep tendon reflexes 2+ except as noted in MSK exam    Bilateral Lower Extremity:  Right Hip     Inspection: well healed incision    Range of Motion: full without pain    - log roll    - Trendelenburg sign    Motor: 5/5 Q/HS/TA/GS/P    Pulses: 2+ DP / 2+ PT    SILT DP/SP/S/S/TN    Imaging:  No new imaging obtained today       BMI:   Estimated body mass index is 20.97 kg/m² as calculated from the following:    Height as of this encounter: 5' 5\" (1.651 m).    Weight as of this encounter: 57.2 kg (126 lb).  BSA:   Estimated body surface area is 1.63 meters squared as calculated from the following:    Height as of this " "encounter: 5' 5\" (1.651 m).    Weight as of this encounter: 57.2 kg (126 lb).           Scribe Attestation      I,:  Teena Martinez am acting as a scribe while in the presence of the attending physician.:       I,:  Seamus Dash, DO personally performed the services described in this documentation    as scribed in my presence.:             "

## 2024-03-12 ENCOUNTER — RA CDI HCC (OUTPATIENT)
Dept: OTHER | Facility: HOSPITAL | Age: 74
End: 2024-03-12

## 2024-03-13 RX ORDER — PREDNISOLONE ACETATE 10 MG/ML
SUSPENSION/ DROPS OPHTHALMIC
COMMUNITY
Start: 2023-12-12

## 2024-03-15 ENCOUNTER — TELEPHONE (OUTPATIENT)
Dept: FAMILY MEDICINE CLINIC | Facility: CLINIC | Age: 74
End: 2024-03-15

## 2024-03-15 ENCOUNTER — APPOINTMENT (OUTPATIENT)
Dept: RADIOLOGY | Facility: CLINIC | Age: 74
End: 2024-03-15
Payer: MEDICARE

## 2024-03-15 ENCOUNTER — OFFICE VISIT (OUTPATIENT)
Dept: FAMILY MEDICINE CLINIC | Facility: CLINIC | Age: 74
End: 2024-03-15
Payer: MEDICARE

## 2024-03-15 VITALS
HEART RATE: 111 BPM | TEMPERATURE: 97.8 F | DIASTOLIC BLOOD PRESSURE: 98 MMHG | RESPIRATION RATE: 18 BRPM | WEIGHT: 125 LBS | OXYGEN SATURATION: 98 % | SYSTOLIC BLOOD PRESSURE: 162 MMHG | HEIGHT: 65 IN | BODY MASS INDEX: 20.83 KG/M2

## 2024-03-15 DIAGNOSIS — M54.2 NECK PAIN: ICD-10-CM

## 2024-03-15 DIAGNOSIS — M25.511 CHRONIC RIGHT SHOULDER PAIN: ICD-10-CM

## 2024-03-15 DIAGNOSIS — G89.29 CHRONIC RIGHT SHOULDER PAIN: ICD-10-CM

## 2024-03-15 DIAGNOSIS — S72.001A CLOSED FRACTURE OF PROXIMAL END OF RIGHT FEMUR, INITIAL ENCOUNTER (HCC): ICD-10-CM

## 2024-03-15 DIAGNOSIS — Z00.00 MEDICARE ANNUAL WELLNESS VISIT, SUBSEQUENT: Primary | ICD-10-CM

## 2024-03-15 PROCEDURE — G0439 PPPS, SUBSEQ VISIT: HCPCS | Performed by: FAMILY MEDICINE

## 2024-03-15 PROCEDURE — 73030 X-RAY EXAM OF SHOULDER: CPT

## 2024-03-15 PROCEDURE — 72050 X-RAY EXAM NECK SPINE 4/5VWS: CPT

## 2024-03-15 PROCEDURE — 99213 OFFICE O/P EST LOW 20 MIN: CPT | Performed by: FAMILY MEDICINE

## 2024-03-15 NOTE — PROGRESS NOTES
Assessment/Plan:       Diagnoses and all orders for this visit:    Medicare annual wellness visit, subsequent    Closed fracture of proximal end of right femur, initial encounter (HCC)    Chronic right shoulder pain  -     XR shoulder 2+ vw right; Future  -     Ambulatory Referral to Physical Therapy; Future    Neck pain  -     XR spine cervical complete 4 or 5 vw non injury; Future  -     Ambulatory Referral to Physical Therapy; Future    Other orders  -     prednisoLONE acetate (PRED FORTE) 1 % ophthalmic suspension            Subjective:     Chief Complaint   Patient presents with    Follow-up     Closed fracture of proximal end of right femur in Nov. At [facility]        Patient ID: Valeria Alba is a 73 y.o. female.    Patient presents today for right shoulder pain  This all stems from a fall she had in November where she broke her hip  When she fell she got her arm stuck in a drawer  It was never worked up during her hospital stay  But she is still having pain and discomfort that radiates up into her neck        The following portions of the patient's history were reviewed and updated as appropriate: allergies, current medications, past family history, past medical history, past social history, past surgical history and problem list.    Review of Systems   Constitutional: Negative.    HENT: Negative.     Eyes: Negative.    Respiratory: Negative.     Cardiovascular: Negative.    Gastrointestinal: Negative.    Endocrine: Negative.    Genitourinary: Negative.    Musculoskeletal:  Positive for arthralgias, back pain and neck pain.   Skin: Negative.    Allergic/Immunologic: Negative.    Neurological: Negative.    Hematological: Negative.    Psychiatric/Behavioral: Negative.     All other systems reviewed and are negative.        Objective:    Vitals:    03/15/24 1425   BP: 162/98   BP Location: Right arm   Patient Position: Sitting   Cuff Size: Standard   Pulse: (!) 111   Resp: 18   Temp: 97.8 °F (36.6 °C)  "  TempSrc: Tympanic   SpO2: 98%   Weight: 56.7 kg (125 lb)   Height: 5' 5\" (1.651 m)          Physical Exam  Vitals and nursing note reviewed.   Constitutional:       Appearance: Normal appearance. She is well-developed.   HENT:      Head: Normocephalic and atraumatic.      Right Ear: External ear normal.      Left Ear: External ear normal.   Eyes:      Conjunctiva/sclera: Conjunctivae normal.      Pupils: Pupils are equal, round, and reactive to light.   Cardiovascular:      Rate and Rhythm: Normal rate and regular rhythm.      Heart sounds: Normal heart sounds.   Pulmonary:      Effort: Pulmonary effort is normal.      Breath sounds: Normal breath sounds.   Abdominal:      General: Bowel sounds are normal.      Palpations: Abdomen is soft.   Musculoskeletal:         General: Normal range of motion.      Cervical back: Normal range of motion.      Comments: Decreased range of motion in shoulder  Pain over right AC joint   Skin:     General: Skin is warm and dry.   Neurological:      General: No focal deficit present.      Mental Status: She is alert and oriented to person, place, and time.      Deep Tendon Reflexes: Reflexes are normal and symmetric.   Psychiatric:         Behavior: Behavior normal.         Thought Content: Thought content normal.         Judgment: Judgment normal.         "

## 2024-03-15 NOTE — PATIENT INSTRUCTIONS
Medicare Preventive Visit Patient Instructions  Thank you for completing your Welcome to Medicare Visit or Medicare Annual Wellness Visit today. Your next wellness visit will be due in one year (3/16/2025).  The screening/preventive services that you may require over the next 5-10 years are detailed below. Some tests may not apply to you based off risk factors and/or age. Screening tests ordered at today's visit but not completed yet may show as past due. Also, please note that scanned in results may not display below.  Preventive Screenings:  Service Recommendations Previous Testing/Comments   Colorectal Cancer Screening  * Colonoscopy    * Fecal Occult Blood Test (FOBT)/Fecal Immunochemical Test (FIT)  * Fecal DNA/Cologuard Test  * Flexible Sigmoidoscopy Age: 45-75 years old   Colonoscopy: every 10 years (may be performed more frequently if at higher risk)  OR  FOBT/FIT: every 1 year  OR  Cologuard: every 3 years  OR  Sigmoidoscopy: every 5 years  Screening may be recommended earlier than age 45 if at higher risk for colorectal cancer. Also, an individualized decision between you and your healthcare provider will decide whether screening between the ages of 76-85 would be appropriate. Colonoscopy: Not on file  FOBT/FIT: Not on file  Cologuard: Not on file  Sigmoidoscopy: Not on file          Breast Cancer Screening Age: 40+ years old  Frequency: every 1-2 years  Not required if history of left and right mastectomy Mammogram: 06/30/2017        Cervical Cancer Screening Between the ages of 21-29, pap smear recommended once every 3 years.   Between the ages of 30-65, can perform pap smear with HPV co-testing every 5 years.   Recommendations may differ for women with a history of total hysterectomy, cervical cancer, or abnormal pap smears in past. Pap Smear: Not on file        Hepatitis C Screening Once for adults born between 1945 and 1965  More frequently in patients at high risk for Hepatitis C Hep C Antibody:  09/28/2020        Diabetes Screening 1-2 times per year if you're at risk for diabetes or have pre-diabetes Fasting glucose: 98 mg/dL (8/17/2023)  A1C: No results in last 5 years (No results in last 5 years)      Cholesterol Screening Once every 5 years if you don't have a lipid disorder. May order more often based on risk factors. Lipid panel: 08/17/2023          Other Preventive Screenings Covered by Medicare:  Abdominal Aortic Aneurysm (AAA) Screening: covered once if your at risk. You're considered to be at risk if you have a family history of AAA.  Lung Cancer Screening: covers low dose CT scan once per year if you meet all of the following conditions: (1) Age 55-77; (2) No signs or symptoms of lung cancer; (3) Current smoker or have quit smoking within the last 15 years; (4) You have a tobacco smoking history of at least 20 pack years (packs per day multiplied by number of years you smoked); (5) You get a written order from a healthcare provider.  Glaucoma Screening: covered annually if you're considered high risk: (1) You have diabetes OR (2) Family history of glaucoma OR (3)  aged 50 and older OR (4)  American aged 65 and older  Osteoporosis Screening: covered every 2 years if you meet one of the following conditions: (1) You're estrogen deficient and at risk for osteoporosis based off medical history and other findings; (2) Have a vertebral abnormality; (3) On glucocorticoid therapy for more than 3 months; (4) Have primary hyperparathyroidism; (5) On osteoporosis medications and need to assess response to drug therapy.   Last bone density test (DXA Scan): 06/21/2017.  HIV Screening: covered annually if you're between the age of 15-65. Also covered annually if you are younger than 15 and older than 65 with risk factors for HIV infection. For pregnant patients, it is covered up to 3 times per pregnancy.    Immunizations:  Immunization Recommendations   Influenza Vaccine Annual  influenza vaccination during flu season is recommended for all persons aged >= 6 months who do not have contraindications   Pneumococcal Vaccine   * Pneumococcal conjugate vaccine = PCV13 (Prevnar 13), PCV15 (Vaxneuvance), PCV20 (Prevnar 20)  * Pneumococcal polysaccharide vaccine = PPSV23 (Pneumovax) Adults 19-63 yo with certain risk factors or if 65+ yo  If never received any pneumonia vaccine: recommend Prevnar 20 (PCV20)  Give PCV20 if previously received 1 dose of PCV13 or PPSV23   Hepatitis B Vaccine 3 dose series if at intermediate or high risk (ex: diabetes, end stage renal disease, liver disease)   Respiratory syncytial virus (RSV) Vaccine - COVERED BY MEDICARE PART D  * RSVPreF3 (Arexvy) CDC recommends that adults 60 years of age and older may receive a single dose of RSV vaccine using shared clinical decision-making (SCDM)   Tetanus (Td) Vaccine - COST NOT COVERED BY MEDICARE PART B Following completion of primary series, a booster dose should be given every 10 years to maintain immunity against tetanus. Td may also be given as tetanus wound prophylaxis.   Tdap Vaccine - COST NOT COVERED BY MEDICARE PART B Recommended at least once for all adults. For pregnant patients, recommended with each pregnancy.   Shingles Vaccine (Shingrix) - COST NOT COVERED BY MEDICARE PART B  2 shot series recommended in those 19 years and older who have or will have weakened immune systems or those 50 years and older     Health Maintenance Due:      Topic Date Due   • Lung Cancer Screening  03/15/2025 (Originally 3/22/2001)   • Colorectal Cancer Screening  03/15/2025 (Originally 3/22/1996)   • Breast Cancer Screening: Mammogram  03/15/2025 (Originally 6/21/2018)   • Hepatitis C Screening  Completed     Immunizations Due:      Topic Date Due   • Influenza Vaccine (1) 09/01/2023   • COVID-19 Vaccine (5 - 2023-24 season) 09/01/2023     Advance Directives   What are advance directives?  Advance directives are legal documents that  state your wishes and plans for medical care. These plans are made ahead of time in case you lose your ability to make decisions for yourself. Advance directives can apply to any medical decision, such as the treatments you want, and if you want to donate organs.   What are the types of advance directives?  There are many types of advance directives, and each state has rules about how to use them. You may choose a combination of any of the following:  Living will:  This is a written record of the treatment you want. You can also choose which treatments you do not want, which to limit, and which to stop at a certain time. This includes surgery, medicine, IV fluid, and tube feedings.   Durable power of  for healthcare (DPAHC):  This is a written record that states who you want to make healthcare choices for you when you are unable to make them for yourself. This person, called a proxy, is usually a family member or a friend. You may choose more than 1 proxy.  Do not resuscitate (DNR) order:  A DNR order is used in case your heart stops beating or you stop breathing. It is a request not to have certain forms of treatment, such as CPR. A DNR order may be included in other types of advance directives.  Medical directive:  This covers the care that you want if you are in a coma, near death, or unable to make decisions for yourself. You can list the treatments you want for each condition. Treatment may include pain medicine, surgery, blood transfusions, dialysis, IV or tube feedings, and a ventilator (breathing machine).  Values history:  This document has questions about your views, beliefs, and how you feel and think about life. This information can help others choose the care that you would choose.  Why are advance directives important?  An advance directive helps you control your care. Although spoken wishes may be used, it is better to have your wishes written down. Spoken wishes can be misunderstood, or not  followed. Treatments may be given even if you do not want them. An advance directive may make it easier for your family to make difficult choices about your care.   Cigarette Smoking and Your Health   Risks to your health if you smoke:  Nicotine and other chemicals found in tobacco damage every cell in your body. Even if you are a light smoker, you have an increased risk for cancer, heart disease, and lung disease. If you are pregnant or have diabetes, smoking increases your risk for complications.   Benefits to your health if you stop smoking:   You decrease respiratory symptoms such as coughing, wheezing, and shortness of breath.   You reduce your risk for cancers of the lung, mouth, throat, kidney, bladder, pancreas, stomach, and cervix. If you already have cancer, you increase the benefits of chemotherapy. You also reduce your risk for cancer returning or a second cancer from developing.   You reduce your risk for heart disease, blood clots, heart attack, and stroke.   You reduce your risk for lung infections, and diseases such as pneumonia, asthma, chronic bronchitis, and emphysema.  Your circulation improves. More oxygen can be delivered to your body. If you have diabetes, you lower your risk for complications, such as kidney, artery, and eye diseases. You also lower your risk for nerve damage. Nerve damage can lead to amputations, poor vision, and blindness.  You improve your body's ability to heal and to fight infections.  For more information and support to stop smoking:   Smokefree.gov  Phone: 6- 345 - 025-7323  Web Address: www.smokefree.gov     © Copyright [employer] 2018 Information is for End User's use only and may not be sold, redistributed or otherwise used for commercial purposes. All illustrations and images included in CareNotes® are the copyrighted property of [employer] Inc[employer] or [employer]

## 2024-03-15 NOTE — PROGRESS NOTES
Assessment and Plan:     Problem List Items Addressed This Visit    None  Visit Diagnoses       Medicare annual wellness visit, subsequent    -  Primary    Closed fracture of proximal end of right femur, initial encounter (HCC)        Chronic right shoulder pain        Relevant Orders    XR shoulder 2+ vw right    Ambulatory Referral to Physical Therapy    Neck pain        Relevant Orders    XR spine cervical complete 4 or 5 vw non injury    Ambulatory Referral to Physical Therapy          Medicare wellness visit completed  See other note for acute chronic issues              Depression Screening and Follow-up Plan: Patient was screened for depression during today's encounter. They screened negative with a PHQ-2 score of 0.      Preventive health issues were discussed with patient, and age appropriate screening tests were ordered as noted in patient's After Visit Summary.  Personalized health advice and appropriate referrals for health education or preventive services given if needed, as noted in patient's After Visit Summary.     History of Present Illness:     Patient presents for a Medicare Wellness Visit    Patient is here for Medicare wellness visit  See the note for acute chronic issues       Patient Care Team:  Florian Minor DO as PCP - General     Review of Systems:     Review of Systems   Constitutional: Negative.    HENT: Negative.     Eyes: Negative.    Respiratory: Negative.     Cardiovascular: Negative.    Gastrointestinal: Negative.    Endocrine: Negative.    Genitourinary: Negative.    Musculoskeletal:  Positive for arthralgias and back pain.   Skin: Negative.    Allergic/Immunologic: Negative.    Neurological: Negative.    Hematological: Negative.    Psychiatric/Behavioral: Negative.     All other systems reviewed and are negative.       Problem List:     Patient Active Problem List   Diagnosis    Abnormal mammogram    Acute arthropathy    Back pain, chronic    Benign essential hypertension    Candidal  intertrigo    Chronic sinusitis    Dyshidrotic eczema    Generalized osteoarthritis    Gout    Hip pain    HTN (hypertension)    Head injury    Suture check      Past Medical and Surgical History:     No past medical history on file.  Past Surgical History:   Procedure Laterality Date    HIP ARTHROPLASTY Right 11/11/2023    Procedure: ARTHROPLASTY HIP TOTAL REVISION, ORIF Femur;  Surgeon: Seamus Dash DO;  Location:  MAIN OR;  Service: Orthopedics    REPLACEMENT TOTAL KNEE        Family History:     Family History   Problem Relation Age of Onset    Hypertension Mother     Aortic aneurysm Family         abdominal    Brain cancer Family     Hypertension Family     Stroke Family         snydrome      Social History:     Social History     Socioeconomic History    Marital status: /Civil Union     Spouse name: None    Number of children: None    Years of education: None    Highest education level: None   Occupational History    None   Tobacco Use    Smoking status: Some Days     Current packs/day: 1.00     Average packs/day: 1 pack/day for 50.0 years (50.0 ttl pk-yrs)     Types: Cigarettes     Passive exposure: Current    Smokeless tobacco: Never   Vaping Use    Vaping status: Never Used   Substance and Sexual Activity    Alcohol use: Not Currently    Drug use: Never    Sexual activity: None   Other Topics Concern    None   Social History Narrative    None     Social Determinants of Health     Financial Resource Strain: Low Risk  (11/10/2022)    Overall Financial Resource Strain (CARDIA)     Difficulty of Paying Living Expenses: Not very hard   Food Insecurity: No Food Insecurity (3/15/2024)    Hunger Vital Sign     Worried About Running Out of Food in the Last Year: Never true     Ran Out of Food in the Last Year: Never true   Transportation Needs: No Transportation Needs (3/15/2024)    PRAPARE - Transportation     Lack of Transportation (Medical): No     Lack of Transportation (Non-Medical): No    Physical Activity: Inactive (10/12/2020)    Exercise Vital Sign     Days of Exercise per Week: 0 days     Minutes of Exercise per Session: 0 min   Stress: Stress Concern Present (10/12/2020)    [employer] - Occupational Stress Questionnaire     Feeling of Stress : To some extent   Social Connections: Moderately Isolated (10/12/2020)    Social Connection and Isolation Panel [NHANES]     Frequency of Communication with Friends and Family: More than three times a week     Frequency of Social Gatherings with Friends and Family: Never     Attends Restorationist Services: Never     Active Member of Clubs or Organizations: No     Attends Club or Organization Meetings: Never     Marital Status:    Intimate Partner Violence: Not At Risk (3/15/2024)    Humiliation, Afraid, Rape, and Kick questionnaire     Fear of Current or Ex-Partner: No     Emotionally Abused: No     Physically Abused: No     Sexually Abused: No   Housing Stability: Low Risk  (3/15/2024)    Housing Stability Vital Sign     Unable to Pay for Housing in the Last Year: No     Number of Places Lived in the Last Year: 1     Unstable Housing in the Last Year: No      Medications and Allergies:     Current Outpatient Medications   Medication Sig Dispense Refill    allopurinol (ZYLOPRIM) 100 mg tablet Take 1 tablet (100 mg total) by mouth daily 90 tablet 1    celecoxib (CeleBREX) 100 mg capsule Take 1 capsule (100 mg total) by mouth 2 (two) times a day 180 capsule 1    DAILY MULTIPLE VITAMINS/IRON TABS Take 1 tablet by mouth daily      prednisoLONE acetate (PRED FORTE) 1 % ophthalmic suspension       aspirin 81 mg chewable tablet Chew 1 tablet (81 mg total) 2 (two) times a day for 28 days (Patient not taking: Reported on 3/15/2024) 56 tablet 0    HYDROcodone-acetaminophen (Norco) 5-325 mg per tablet Take 1 tablet by mouth every 6 (six) hours as needed for pain Max Daily Amount: 4 tablets (Patient not taking: Reported on 3/15/2024)  "42 tablet 0    ondansetron (ZOFRAN-ODT) 4 mg disintegrating tablet Take 1 tablet (4 mg total) by mouth every 6 (six) hours as needed for nausea or vomiting (Patient not taking: Reported on 3/15/2024) 20 tablet 0    traMADol (Ultram) 50 mg tablet Take 1 tablet (50 mg total) by mouth every 6 (six) hours as needed for moderate pain (Patient not taking: Reported on 3/15/2024) 42 tablet 0     No current facility-administered medications for this visit.     Allergies   Allergen Reactions    Codeine GI Intolerance    Medical Tape Other (See Comments)     \"redness\"      Immunizations:     Immunization History   Administered Date(s) Administered    COVID-19 MODERNA VACC 0.5 ML IM 02/27/2021, 03/29/2021, 11/03/2021    COVID-19 Moderna Vac BIVALENT 12 Yr+ IM 0.5 ML 11/02/2022    Influenza Split High Dose Preservative Free IM 10/25/2016, 10/20/2017    Influenza, high dose seasonal 0.7 mL 10/17/2018, 10/23/2019, 09/28/2020, 09/30/2021, 11/10/2022    Influenza, seasonal, injectable 10/01/2013    Pneumococcal Conjugate 13-Valent 02/15/2017    Pneumococcal Polysaccharide PPV23 10/17/2018    Tdap 10/10/2020      Health Maintenance:         Topic Date Due    Lung Cancer Screening  03/15/2025 (Originally 3/22/2001)    Colorectal Cancer Screening  03/15/2025 (Originally 3/22/1996)    Breast Cancer Screening: Mammogram  03/15/2025 (Originally 6/21/2018)    Hepatitis C Screening  Completed         Topic Date Due    Influenza Vaccine (1) 09/01/2023    COVID-19 Vaccine (5 - 2023-24 season) 09/01/2023      Medicare Screening Tests and Risk Assessments:     Valeria is here for her Subsequent Wellness visit. Last Medicare Wellness visit information reviewed, patient interviewed and updates made to the record today.      Health Risk Assessment:   Patient rates overall health as fair. Patient feels that their physical health rating is same. Patient is satisfied with their life. Eyesight was rated as same. Hearing was rated as same. Patient " feels that their emotional and mental health rating is same. Patient states they are never, rarely unusually tired/fatigued. Pain experienced in the last 7 days has been some. Patient's pain rating has been 4/10. Patient states that she has experienced no weight loss or gain in last 6 months.     Depression Screening:   PHQ-2 Score: 0      Fall Risk Screening:   In the past year, patient has experienced: no history of falling in past year      Urinary Incontinence Screening:   Patient has not leaked urine accidently in the last six months.     Home Safety:  Patient does not have trouble with stairs inside or outside of their home. Patient has working smoke alarms and has working carbon monoxide detector. Home safety hazards include: none.     Nutrition:   Current diet is Regular.     Medications:   Patient is currently taking over-the-counter supplements. OTC medications include: see medication list. Patient is able to manage medications.     Activities of Daily Living (ADLs)/Instrumental Activities of Daily Living (IADLs):   Walk and transfer into and out of bed and chair?: Yes  Dress and groom yourself?: Yes    Bathe or shower yourself?: Yes    Feed yourself? Yes  Do your laundry/housekeeping?: Yes  Manage your money, pay your bills and track your expenses?: Yes  Make your own meals?: Yes    Do your own shopping?: Yes    Previous Hospitalizations:   Any hospitalizations or ED visits within the last 12 months?: No      Advance Care Planning:   Living will: No    Durable POA for healthcare: No    Advanced directive: No    Advanced directive counseling given: Yes    End of Life Decisions reviewed with patient: Yes    Provider agrees with end of life decisions: Yes      Cognitive Screening:   Provider or family/friend/caregiver concerned regarding cognition?: No    PREVENTIVE SCREENINGS      Cardiovascular Screening:    General: Screening Current      Diabetes Screening:     General: Screening Current      Colorectal  "Cancer Screening:     General: Patient Declines      Breast Cancer Screening:     General: Patient Declines      Cervical Cancer Screening:    General: Screening Not Indicated      Osteoporosis Screening:    General: Patient Declines      Abdominal Aortic Aneurysm (AAA) Screening:        General: Screening Current      Lung Cancer Screening:     General: Patient Declines      Hepatitis C Screening:    General: Screening Current    Screening, Brief Intervention, and Referral to Treatment (SBIRT)    Screening    Typical number of drinks in a week: 0    Single Item Drug Screening:  How often have you used an illegal drug (including marijuana) or a prescription medication for non-medical reasons in the past year? never    Single Item Drug Screen Score: 0  Interpretation: Negative screen for possible drug use disorder    Brief Intervention  Alcohol & drug use screenings were reviewed. No concerns regarding substance use disorder identified.     Review of Current Opioid Use    Opioid Risk Tool (ORT) Interpretation: Complete Opioid Risk Tool (ORT)    Other Counseling Topics:   Car/seat belt/driving safety, skin self-exam, sunscreen and regular weightbearing exercise and calcium and vitamin D intake.     No results found.     Physical Exam:     /98 (BP Location: Right arm, Patient Position: Sitting, Cuff Size: Standard)   Pulse (!) 111   Temp 97.8 °F (36.6 °C) (Tympanic)   Resp 18   Ht 5' 5\" (1.651 m)   Wt 56.7 kg (125 lb)   SpO2 98%   BMI 20.80 kg/m²     Physical Exam  Vitals and nursing note reviewed.   Constitutional:       Appearance: Normal appearance. She is well-developed.   HENT:      Head: Normocephalic.      Right Ear: External ear normal.      Left Ear: External ear normal.      Nose: Nose normal.   Eyes:      Conjunctiva/sclera: Conjunctivae normal.      Pupils: Pupils are equal, round, and reactive to light.   Cardiovascular:      Rate and Rhythm: Normal rate and regular rhythm.      Heart sounds: " Normal heart sounds.   Pulmonary:      Effort: Pulmonary effort is normal.      Breath sounds: Normal breath sounds.   Abdominal:      General: Bowel sounds are normal.      Palpations: Abdomen is soft.   Musculoskeletal:         General: Normal range of motion.      Cervical back: Normal range of motion and neck supple.   Skin:     General: Skin is warm and dry.   Neurological:      General: No focal deficit present.      Mental Status: She is alert and oriented to person, place, and time.   Psychiatric:         Behavior: Behavior normal.         Thought Content: Thought content normal.         Judgment: Judgment normal.          Florian Minor, DO

## 2024-03-15 NOTE — TELEPHONE ENCOUNTER
----- Message from Florian Minor DO sent at 3/15/2024  4:34 PM EDT -----  She has some mild arthritis of her shoulder  That is what I think she is feeling over her AC joint  I will try to do some physical therapy to see if it feels better but otherwise no major other issues with this x-ray

## 2024-07-01 DIAGNOSIS — M15.9 PRIMARY OSTEOARTHRITIS INVOLVING MULTIPLE JOINTS: ICD-10-CM

## 2024-07-01 DIAGNOSIS — M10.9 GOUT, UNSPECIFIED CAUSE, UNSPECIFIED CHRONICITY, UNSPECIFIED SITE: ICD-10-CM

## 2024-07-01 RX ORDER — ALLOPURINOL 100 MG/1
100 TABLET ORAL DAILY
Qty: 90 TABLET | Refills: 1 | Status: SHIPPED | OUTPATIENT
Start: 2024-07-01

## 2024-07-01 RX ORDER — CELECOXIB 100 MG/1
100 CAPSULE ORAL 2 TIMES DAILY
Qty: 180 CAPSULE | Refills: 1 | Status: SHIPPED | OUTPATIENT
Start: 2024-07-01

## 2024-09-30 ENCOUNTER — TELEPHONE (OUTPATIENT)
Dept: OTHER | Facility: HOSPITAL | Age: 74
End: 2024-09-30

## 2024-09-30 DIAGNOSIS — M15.0 PRIMARY OSTEOARTHRITIS INVOLVING MULTIPLE JOINTS: ICD-10-CM

## 2024-09-30 RX ORDER — CELECOXIB 100 MG/1
100 CAPSULE ORAL 2 TIMES DAILY
Qty: 180 CAPSULE | Refills: 1 | Status: SHIPPED | OUTPATIENT
Start: 2024-09-30

## 2024-09-30 NOTE — TELEPHONE ENCOUNTER
Patient called to see if she needs a pneumonia vaccine?  I saw she had two, but not sure if it is the most UTD.  I told her I would check with you and I can call her back.  [phone]  Thank you.

## 2024-09-30 NOTE — TELEPHONE ENCOUNTER
Patient inquiring if flu and covid shots are available. Is requesting someone reach out to schedule for these too when they are available.

## 2024-11-08 ENCOUNTER — TELEPHONE (OUTPATIENT)
Age: 74
End: 2024-11-08

## 2024-11-08 NOTE — TELEPHONE ENCOUNTER
Pt has COVID shot question.  She was wondering if she should get one in the office or at pharmacy.  Call her back about that.

## 2024-12-20 ENCOUNTER — OFFICE VISIT (OUTPATIENT)
Dept: OBGYN CLINIC | Facility: CLINIC | Age: 74
End: 2024-12-20
Payer: MEDICARE

## 2024-12-20 ENCOUNTER — APPOINTMENT (OUTPATIENT)
Dept: RADIOLOGY | Facility: CLINIC | Age: 74
End: 2024-12-20
Payer: MEDICARE

## 2024-12-20 VITALS — HEIGHT: 65 IN | WEIGHT: 125 LBS | BODY MASS INDEX: 20.83 KG/M2

## 2024-12-20 DIAGNOSIS — Z96.641 HISTORY OF REVISION OF TOTAL REPLACEMENT OF RIGHT HIP JOINT: ICD-10-CM

## 2024-12-20 DIAGNOSIS — Z47.89 AFTERCARE FOLLOWING SURGERY OF THE MUSCULOSKELETAL SYSTEM: ICD-10-CM

## 2024-12-20 DIAGNOSIS — Z47.89 AFTERCARE FOLLOWING SURGERY OF THE MUSCULOSKELETAL SYSTEM: Primary | ICD-10-CM

## 2024-12-20 PROCEDURE — 99214 OFFICE O/P EST MOD 30 MIN: CPT | Performed by: STUDENT IN AN ORGANIZED HEALTH CARE EDUCATION/TRAINING PROGRAM

## 2024-12-20 PROCEDURE — 73502 X-RAY EXAM HIP UNI 2-3 VIEWS: CPT

## 2024-12-20 NOTE — PROGRESS NOTES
"Hip Follow up Office Note    Assessment:     1. Aftercare following surgery of the musculoskeletal system    2. 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 X-ray shows stable prosthesis with no fracture from resent falls. Bone contusion can take 3-4 months to fully resolve. Stationary bike,elliptical for low impact exercise. See back in 5 years with repeat imaging unless any new issues.     Plan:  74 y.o. female doing well s/p revision right total hip arthroplasty with ORIF femur performed on 11/11/2023. She is walking with SPC. She states she has fallen on right side recently  and needed help up by her neighbor. Noticed bruising on right lateral side which has been slowly improving. She denies any groin pain.       Subjective:     Patient ID: Valeria Alba is a 74 y.o. female.    Chief Complaint:  HPI:  Patient is a 74 y.o. female here s/p revision right total hip arthroplasty with ORIF femur performed on 11/11/2023.   Allergy:  Allergies   Allergen Reactions    Codeine GI Intolerance    Medical Tape Other (See Comments)     \"redness\"     Medications:  all current active meds have been reviewed  Past Medical History:  No past medical history on file.  Past Surgical History:  Past Surgical History:   Procedure Laterality Date    HIP ARTHROPLASTY Right 11/11/2023    Procedure: ARTHROPLASTY HIP TOTAL REVISION, ORIF Femur;  Surgeon: Seamus Dash DO;  Location:  MAIN OR;  Service: Orthopedics    REPLACEMENT TOTAL KNEE       Family History:  Family History   Problem Relation Age of Onset    Hypertension Mother " "    Aortic aneurysm Family         abdominal    Brain cancer Family     Hypertension Family     Stroke Family         snydrome     Social History:  Social History     Substance and Sexual Activity   Alcohol Use Not Currently     Social History     Substance and Sexual Activity   Drug Use Never     Social History     Tobacco Use   Smoking Status Some Days    Current packs/day: 1.00    Average packs/day: 1 pack/day for 50.0 years (50.0 ttl pk-yrs)    Types: Cigarettes    Passive exposure: Current   Smokeless Tobacco Never           ROS:  General: Per HPI  Skin: Negative, except if noted below  HEENT: Negative  Respiratory: Negative  Cardiovascular: Negative  Gastrointestinal: Negative  Urinary: Negative  Vascular: Negative  Musculoskeletal: Positive per HPI   Neurologic: Positive per HPI  Endocrine: Negative    Objective:  BP Readings from Last 1 Encounters:   03/15/24 162/98      Wt Readings from Last 1 Encounters:   12/20/24 56.7 kg (125 lb)        Respiratory:   non-labored respirations    Lymphatics:  no palpable lymph nodes    Gait and Station:   Ambulates with the assistance of a walker    Neurologic:   Alert and oriented times 3  Patient with normal sensation except as noted below  Deep tendon reflexes 2+ except as noted in MSK exam    Bilateral Lower Extremity:  Right Hip     Inspection: well healed incision    Range of Motion: full without pain    - log roll    - Trendelenburg sign    Motor: 5/5 Q/HS/TA/GS/P    Pulses: 2+ DP / 2+ PT    SILT DP/SP/S/S/TN    Imaging:  XR right hip: s/p cone-conical res mod revision total hip arthroplasty. No hardware failure or dislocation. Healed GT fracture.        BMI:   Estimated body mass index is 20.8 kg/m² as calculated from the following:    Height as of this encounter: 5' 5\" (1.651 m).    Weight as of this encounter: 56.7 kg (125 lb).  BSA:   Estimated body surface area is 1.62 meters squared as calculated from the following:    Height as of this encounter: 5' 5\" " (1.651 m).    Weight as of this encounter: 56.7 kg (125 lb).           Scribe Attestation      I,:  Asher John am acting as a scribe while in the presence of the attending physician.:       I,:  Seamus Dash, DO personally performed the services described in this documentation    as scribed in my presence.:

## 2024-12-26 ENCOUNTER — OFFICE VISIT (OUTPATIENT)
Dept: FAMILY MEDICINE CLINIC | Facility: CLINIC | Age: 74
End: 2024-12-26
Payer: MEDICARE

## 2024-12-26 VITALS
WEIGHT: 120.6 LBS | OXYGEN SATURATION: 98 % | SYSTOLIC BLOOD PRESSURE: 162 MMHG | HEART RATE: 114 BPM | TEMPERATURE: 98.1 F | DIASTOLIC BLOOD PRESSURE: 106 MMHG | BODY MASS INDEX: 20.09 KG/M2 | HEIGHT: 65 IN | RESPIRATION RATE: 17 BRPM

## 2024-12-26 DIAGNOSIS — M79.601 RIGHT ARM PAIN: Primary | ICD-10-CM

## 2024-12-26 DIAGNOSIS — Z13.6 SCREENING FOR CARDIOVASCULAR CONDITION: ICD-10-CM

## 2024-12-26 DIAGNOSIS — Z23 ENCOUNTER FOR IMMUNIZATION: ICD-10-CM

## 2024-12-26 DIAGNOSIS — Z79.899 HIGH RISK MEDICATION USE: ICD-10-CM

## 2024-12-26 DIAGNOSIS — E78.5 HYPERLIPIDEMIA, UNSPECIFIED HYPERLIPIDEMIA TYPE: ICD-10-CM

## 2024-12-26 DIAGNOSIS — M10.9 GOUT, UNSPECIFIED CAUSE, UNSPECIFIED CHRONICITY, UNSPECIFIED SITE: ICD-10-CM

## 2024-12-26 DIAGNOSIS — M25.511 ACUTE PAIN OF RIGHT SHOULDER: ICD-10-CM

## 2024-12-26 PROCEDURE — 90662 IIV NO PRSV INCREASED AG IM: CPT

## 2024-12-26 PROCEDURE — G0008 ADMIN INFLUENZA VIRUS VAC: HCPCS

## 2024-12-26 PROCEDURE — 99214 OFFICE O/P EST MOD 30 MIN: CPT | Performed by: FAMILY MEDICINE

## 2024-12-26 NOTE — PROGRESS NOTES
Name: Valeria Alba      : 1950      MRN: [id number]  Encounter Provider: Florian Minor DO  Encounter Date: 2024   Encounter department: [facility]  :  Assessment & Plan  Right arm pain    Orders:    XR humerus right; Future    Ambulatory Referral to Physical Therapy; Future    Acute pain of right shoulder    Orders:    XR shoulder 2+ vw right; Future    Ambulatory Referral to Physical Therapy; Future    Gout, unspecified cause, unspecified chronicity, unspecified site    Orders:    Uric acid; Future    Hyperlipidemia, unspecified hyperlipidemia type    Orders:    Lipid panel; Future    High risk medication use    Orders:    CBC and differential; Future    Comprehensive metabolic panel; Future    TSH, 3rd generation with Free T4 reflex; Future    Screening for cardiovascular condition    Orders:    CBC and differential; Future    Comprehensive metabolic panel; Future    UA (URINE) with reflex to Scope; Future    Labs ordered  X-rays of right shoulder and upper arm ordered  Patient referred to physical therapy  She will continue her other medications  She will follow-up with orthopedics  She can follow-up with me in 3 to 4 months or sooner if needed      Tobacco Cessation Counseling: Tobacco cessation counseling was provided. The patient is sincerely urged to quit consumption of tobacco. She is not ready to quit tobacco.       History of Present Illness     Patient presents today for multiple complaints  Her blood pressure is elevated today  She has had no recent falls she has right shoulder pain and decreased range of motion  She has been following with orthopedics for her recent falls      Shoulder Pain       Review of Systems   Constitutional: Negative.    HENT: Negative.     Eyes: Negative.    Respiratory: Negative.     Cardiovascular: Negative.    Gastrointestinal: Negative.    Endocrine: Negative.    Genitourinary: Negative.    Musculoskeletal:  Positive for  "arthralgias.   Skin: Negative.    Allergic/Immunologic: Negative.    Neurological: Negative.    Hematological: Negative.    Psychiatric/Behavioral: Negative.     All other systems reviewed and are negative.      Objective   BP (!) 162/106 (BP Location: Left arm, Patient Position: Sitting, Cuff Size: Large)   Pulse (!) 114   Temp 98.1 °F (36.7 °C) (Tympanic)   Resp 17   Ht 5' 5\" (1.651 m)   Wt 54.7 kg (120 lb 9.6 oz)   SpO2 98%   BMI 20.07 kg/m²      Physical Exam  Vitals and nursing note reviewed.   Constitutional:       Appearance: Normal appearance. She is well-developed.   HENT:      Head: Normocephalic.      Right Ear: External ear normal.      Left Ear: External ear normal.      Nose: Nose normal.   Eyes:      Conjunctiva/sclera: Conjunctivae normal.      Pupils: Pupils are equal, round, and reactive to light.   Cardiovascular:      Rate and Rhythm: Normal rate and regular rhythm.      Heart sounds: Normal heart sounds.   Pulmonary:      Effort: Pulmonary effort is normal.      Breath sounds: Normal breath sounds.   Abdominal:      General: Bowel sounds are normal.      Palpations: Abdomen is soft.   Musculoskeletal:         General: Normal range of motion.      Cervical back: Normal range of motion and neck supple.      Comments: Decreased range of motion of  right shoulder   Skin:     General: Skin is warm and dry.   Neurological:      General: No focal deficit present.      Mental Status: She is alert and oriented to person, place, and time.   Psychiatric:         Behavior: Behavior normal.         Thought Content: Thought content normal.         Judgment: Judgment normal.         "

## 2024-12-30 DIAGNOSIS — M10.9 GOUT, UNSPECIFIED CAUSE, UNSPECIFIED CHRONICITY, UNSPECIFIED SITE: ICD-10-CM

## 2024-12-30 NOTE — TELEPHONE ENCOUNTER
Reason for call:   [x] Refill   [] Prior Auth  [] Other:     Office:   [x] PCP/Provider - Florian Page, DO  [] Specialty/Provider -     Medication: allopurinol (ZYLOPRIM) 100 mg tablet     Dose/Frequency: 100 mg, Oral, Daily     Quantity: 90    Pharmacy: [facility]/pharmacy #[id number] [address]     Does the patient have enough for 3 days?   [x] Yes   [] No - Send as HP to POD

## 2024-12-31 RX ORDER — ALLOPURINOL 100 MG/1
100 TABLET ORAL DAILY
Qty: 30 TABLET | Refills: 0 | Status: SHIPPED | OUTPATIENT
Start: 2024-12-31

## 2025-01-25 DIAGNOSIS — M10.9 GOUT, UNSPECIFIED CAUSE, UNSPECIFIED CHRONICITY, UNSPECIFIED SITE: ICD-10-CM

## 2025-01-27 RX ORDER — ALLOPURINOL 100 MG/1
100 TABLET ORAL DAILY
Qty: 90 TABLET | Refills: 1 | Status: SHIPPED | OUTPATIENT
Start: 2025-01-27

## 2025-03-28 ENCOUNTER — OFFICE VISIT (OUTPATIENT)
Dept: FAMILY MEDICINE CLINIC | Facility: CLINIC | Age: 75
End: 2025-03-28
Payer: MEDICARE

## 2025-03-28 VITALS
BODY MASS INDEX: 19.93 KG/M2 | OXYGEN SATURATION: 99 % | DIASTOLIC BLOOD PRESSURE: 90 MMHG | WEIGHT: 119.6 LBS | SYSTOLIC BLOOD PRESSURE: 148 MMHG | RESPIRATION RATE: 17 BRPM | TEMPERATURE: 98.4 F | HEIGHT: 65 IN | HEART RATE: 110 BPM

## 2025-03-28 DIAGNOSIS — Z00.00 MEDICARE ANNUAL WELLNESS VISIT, SUBSEQUENT: Primary | ICD-10-CM

## 2025-03-28 DIAGNOSIS — S49.91XS ARM INJURY, RIGHT, SEQUELA: ICD-10-CM

## 2025-03-28 DIAGNOSIS — Z59.82 INABILITY TO ACQUIRE TRANSPORTATION: ICD-10-CM

## 2025-03-28 DIAGNOSIS — S49.92XS: ICD-10-CM

## 2025-03-28 DIAGNOSIS — M15.0 PRIMARY OSTEOARTHRITIS INVOLVING MULTIPLE JOINTS: ICD-10-CM

## 2025-03-28 PROCEDURE — G0439 PPPS, SUBSEQ VISIT: HCPCS | Performed by: FAMILY MEDICINE

## 2025-03-28 RX ORDER — CELECOXIB 100 MG/1
100 CAPSULE ORAL 3 TIMES DAILY
Qty: 270 CAPSULE | Refills: 1 | Status: SHIPPED | OUTPATIENT
Start: 2025-03-28

## 2025-03-28 SDOH — ECONOMIC STABILITY - TRANSPORTATION SECURITY: TRANSPORTATION INSECURITY: Z59.82

## 2025-03-28 NOTE — PATIENT INSTRUCTIONS
Medicare Preventive Visit Patient Instructions  Thank you for completing your Welcome to Medicare Visit or Medicare Annual Wellness Visit today. Your next wellness visit will be due in one year (3/29/2026).  The screening/preventive services that you may require over the next 5-10 years are detailed below. Some tests may not apply to you based off risk factors and/or age. Screening tests ordered at today's visit but not completed yet may show as past due. Also, please note that scanned in results may not display below.  Preventive Screenings:  Service Recommendations Previous Testing/Comments   Colorectal Cancer Screening  * Colonoscopy    * Fecal Occult Blood Test (FOBT)/Fecal Immunochemical Test (FIT)  * Fecal DNA/Cologuard Test  * Flexible Sigmoidoscopy Age: 45-75 years old   Colonoscopy: every 10 years (may be performed more frequently if at higher risk)  OR  FOBT/FIT: every 1 year  OR  Cologuard: every 3 years  OR  Sigmoidoscopy: every 5 years  Screening may be recommended earlier than age 45 if at higher risk for colorectal cancer. Also, an individualized decision between you and your healthcare provider will decide whether screening between the ages of 76-85 would be appropriate. Colonoscopy: Not on file  FOBT/FIT: Not on file  Cologuard: Not on file  Sigmoidoscopy: Not on file          Breast Cancer Screening Age: 40+ years old  Frequency: every 1-2 years  Not required if history of left and right mastectomy Mammogram: 06/30/2017        Cervical Cancer Screening Between the ages of 21-29, pap smear recommended once every 3 years.   Between the ages of 30-65, can perform pap smear with HPV co-testing every 5 years.   Recommendations may differ for women with a history of total hysterectomy, cervical cancer, or abnormal pap smears in past. Pap Smear: Not on file        Hepatitis C Screening Once for adults born between 1945 and 1965  More frequently in patients at high risk for Hepatitis C Hep C Antibody:  09/28/2020        Diabetes Screening 1-2 times per year if you're at risk for diabetes or have pre-diabetes Fasting glucose: 98 mg/dL (8/17/2023)  A1C: No results in last 5 years (No results in last 5 years)      Cholesterol Screening Once every 5 years if you don't have a lipid disorder. May order more often based on risk factors. Lipid panel: 08/17/2023          Other Preventive Screenings Covered by Medicare:  Abdominal Aortic Aneurysm (AAA) Screening: covered once if your at risk. You're considered to be at risk if you have a family history of AAA.  Lung Cancer Screening: covers low dose CT scan once per year if you meet all of the following conditions: (1) Age 55-77; (2) No signs or symptoms of lung cancer; (3) Current smoker or have quit smoking within the last 15 years; (4) You have a tobacco smoking history of at least 20 pack years (packs per day multiplied by number of years you smoked); (5) You get a written order from a healthcare provider.  Glaucoma Screening: covered annually if you're considered high risk: (1) You have diabetes OR (2) Family history of glaucoma OR (3)  aged 50 and older OR (4)  American aged 65 and older  Osteoporosis Screening: covered every 2 years if you meet one of the following conditions: (1) You're estrogen deficient and at risk for osteoporosis based off medical history and other findings; (2) Have a vertebral abnormality; (3) On glucocorticoid therapy for more than 3 months; (4) Have primary hyperparathyroidism; (5) On osteoporosis medications and need to assess response to drug therapy.   Last bone density test (DXA Scan): 06/21/2017.  HIV Screening: covered annually if you're between the age of 15-65. Also covered annually if you are younger than 15 and older than 65 with risk factors for HIV infection. For pregnant patients, it is covered up to 3 times per pregnancy.    Immunizations:  Immunization Recommendations   Influenza Vaccine Annual  influenza vaccination during flu season is recommended for all persons aged >= 6 months who do not have contraindications   Pneumococcal Vaccine   * Pneumococcal conjugate vaccine = PCV13 (Prevnar 13), PCV15 (Vaxneuvance), PCV20 (Prevnar 20)  * Pneumococcal polysaccharide vaccine = PPSV23 (Pneumovax) Adults 19-63 yo with certain risk factors or if 65+ yo  If never received any pneumonia vaccine: recommend Prevnar 20 (PCV20)  Give PCV20 if previously received 1 dose of PCV13 or PPSV23   Hepatitis B Vaccine 3 dose series if at intermediate or high risk (ex: diabetes, end stage renal disease, liver disease)   Respiratory syncytial virus (RSV) Vaccine - COVERED BY MEDICARE PART D  * RSVPreF3 (Arexvy) CDC recommends that adults 60 years of age and older may receive a single dose of RSV vaccine using shared clinical decision-making (SCDM)   Tetanus (Td) Vaccine - COST NOT COVERED BY MEDICARE PART B Following completion of primary series, a booster dose should be given every 10 years to maintain immunity against tetanus. Td may also be given as tetanus wound prophylaxis.   Tdap Vaccine - COST NOT COVERED BY MEDICARE PART B Recommended at least once for all adults. For pregnant patients, recommended with each pregnancy.   Shingles Vaccine (Shingrix) - COST NOT COVERED BY MEDICARE PART B  2 shot series recommended in those 19 years and older who have or will have weakened immune systems or those 50 years and older     Health Maintenance Due:      Topic Date Due   • Colorectal Cancer Screening  Never done   • Lung Cancer Screening  Never done   • Breast Cancer Screening: Mammogram  06/21/2018   • Hepatitis C Screening  Completed     Immunizations Due:      Topic Date Due   • COVID-19 Vaccine (5 - 2024-25 season) 09/01/2024     Advance Directives   What are advance directives?  Advance directives are legal documents that state your wishes and plans for medical care. These plans are made ahead of time in case you lose your  ability to make decisions for yourself. Advance directives can apply to any medical decision, such as the treatments you want, and if you want to donate organs.   What are the types of advance directives?  There are many types of advance directives, and each state has rules about how to use them. You may choose a combination of any of the following:  Living will:  This is a written record of the treatment you want. You can also choose which treatments you do not want, which to limit, and which to stop at a certain time. This includes surgery, medicine, IV fluid, and tube feedings.   Durable power of  for healthcare (DPAHC):  This is a written record that states who you want to make healthcare choices for you when you are unable to make them for yourself. This person, called a proxy, is usually a family member or a friend. You may choose more than 1 proxy.  Do not resuscitate (DNR) order:  A DNR order is used in case your heart stops beating or you stop breathing. It is a request not to have certain forms of treatment, such as CPR. A DNR order may be included in other types of advance directives.  Medical directive:  This covers the care that you want if you are in a coma, near death, or unable to make decisions for yourself. You can list the treatments you want for each condition. Treatment may include pain medicine, surgery, blood transfusions, dialysis, IV or tube feedings, and a ventilator (breathing machine).  Values history:  This document has questions about your views, beliefs, and how you feel and think about life. This information can help others choose the care that you would choose.  Why are advance directives important?  An advance directive helps you control your care. Although spoken wishes may be used, it is better to have your wishes written down. Spoken wishes can be misunderstood, or not followed. Treatments may be given even if you do not want them. An advance directive may make it easier  for your family to make difficult choices about your care.   Cigarette Smoking and Your Health   Risks to your health if you smoke:  Nicotine and other chemicals found in tobacco damage every cell in your body. Even if you are a light smoker, you have an increased risk for cancer, heart disease, and lung disease. If you are pregnant or have diabetes, smoking increases your risk for complications.   Benefits to your health if you stop smoking:   You decrease respiratory symptoms such as coughing, wheezing, and shortness of breath.   You reduce your risk for cancers of the lung, mouth, throat, kidney, bladder, pancreas, stomach, and cervix. If you already have cancer, you increase the benefits of chemotherapy. You also reduce your risk for cancer returning or a second cancer from developing.   You reduce your risk for heart disease, blood clots, heart attack, and stroke.   You reduce your risk for lung infections, and diseases such as pneumonia, asthma, chronic bronchitis, and emphysema.  Your circulation improves. More oxygen can be delivered to your body. If you have diabetes, you lower your risk for complications, such as kidney, artery, and eye diseases. You also lower your risk for nerve damage. Nerve damage can lead to amputations, poor vision, and blindness.  You improve your body's ability to heal and to fight infections.  For more information and support to stop smoking:   Smokefree.gov  Phone: 6- 671 - 123-1462  Web Address: www.smokefree.gov     © Copyright [employer] 2018 Information is for End User's use only and may not be sold, redistributed or otherwise used for commercial purposes. All illustrations and images included in CareNotes® are the copyrighted property of [employer] or [employer]

## 2025-03-28 NOTE — PROGRESS NOTES
Name: Valeria Alba      : 1950      MRN: [id number]  Encounter Provider: Florian Minor DO  Encounter Date: 3/28/2025   Encounter department: [facility]    Assessment & Plan  Medicare annual wellness visit, subsequent  Medicare wellness visit completed  Patient referred to physical therapy close x-ray ordered of her arm from this injury that happened a few months ago that she never followed up with also remind her to follow-up and get the blood work that was ordered as well  Follow-up with me in 6 months or sooner if needed       Inability to acquire transportation    Orders:    Ambulatory referral to social work care management program; Future    Injury of left shoulder and upper arm, sequela    Orders:    Ambulatory Referral to Physical Therapy; Future    XR shoulder 2+ vw right; Future    XR humerus right; Future    Arm injury, right, sequela    Orders:    XR shoulder 2+ vw right; Future    XR humerus right; Future    Primary osteoarthritis involving multiple joints    Orders:    celecoxib (CeleBREX) 100 mg capsule; Take 1 capsule (100 mg total) by mouth 3 (three) times a day        Depression Screening and Follow-up Plan: Patient was screened for depression during today's encounter. They screened negative with a PHQ-2 score of 0.      Falls Plan of Care: referral to physical therapy and balance, strength, and gait training instructions were provided.       Preventive health issues were discussed with patient, and age appropriate screening tests were ordered as noted in patient's After Visit Summary. Personalized health advice and appropriate referrals for health education or preventive services given if needed, as noted in patient's After Visit Summary.    History of Present Illness     Patient presents today for medicare wellness visit  She is still ahving issues with an upper arm injruy from a few months ago  She never followed up with x-rays that were ordered  She also  did not follow-up with recent blood work  She is having hard time moving her right consistent with a frozen shoulder  No other complaints today       Patient Care Team:  Florian Minor DO as PCP - General    Review of Systems   Constitutional: Negative.    HENT: Negative.     Eyes: Negative.    Respiratory: Negative.     Cardiovascular: Negative.    Gastrointestinal: Negative.    Endocrine: Negative.    Genitourinary: Negative.    Musculoskeletal: Negative.    Skin: Negative.    Allergic/Immunologic: Negative.    Neurological: Negative.    Hematological: Negative.    Psychiatric/Behavioral: Negative.     All other systems reviewed and are negative.    Medical History Reviewed by provider this encounter:  Problems       Annual Wellness Visit Questionnaire   Valeria is here for her Subsequent Wellness visit. Last Medicare Wellness visit information reviewed, patient interviewed and updates made to the record today.      Health Risk Assessment:   Patient rates overall health as fair. Patient feels that their physical health rating is slightly worse. Patient is satisfied with their life. Eyesight was rated as same. Hearing was rated as same. Patient feels that their emotional and mental health rating is slightly worse. Patients states they are never, rarely angry. Patient states they are sometimes unusually tired/fatigued. Pain experienced in the last 7 days has been some. Patient's pain rating has been 4/10. Patient states that she has experienced no weight loss or gain in last 6 months.     Depression Screening:   PHQ-2 Score: 0      Fall Risk Screening:   In the past year, patient has experienced: history of falling in past year    Number of falls: 2 or more  Injured during fall?: Yes    Feels unsteady when standing or walking?: Yes    Worried about falling?: Yes      Urinary Incontinence Screening:   Patient has leaked urine accidently in the last six months.     Home Safety:  Patient has trouble with stairs inside  or outside of their home. Patient has working smoke alarms and has working carbon monoxide detector. Home safety hazards include: none.     Nutrition:   Current diet is Regular.     Medications:   Patient is currently taking over-the-counter supplements. OTC medications include: see medication list. Patient is able to manage medications.     Activities of Daily Living (ADLs)/Instrumental Activities of Daily Living (IADLs):   Walk and transfer into and out of bed and chair?: Yes  Dress and groom yourself?: Yes    Bathe or shower yourself?: Yes    Feed yourself? Yes  Do your laundry/housekeeping?: Yes  Manage your money, pay your bills and track your expenses?: Yes  Make your own meals?: Yes    Do your own shopping?: Yes    Previous Hospitalizations:   Any hospitalizations or ED visits within the last 12 months?: Yes    How many hospitalizations have you had in the last year?: 1-2    Advance Care Planning:   Living will: No    Durable POA for healthcare: No    Advanced directive: No    Advanced directive counseling given: Yes    End of Life Decisions reviewed with patient: Yes    Provider agrees with end of life decisions: Yes      Cognitive Screening:   Provider or family/friend/caregiver concerned regarding cognition?: No    PREVENTIVE SCREENINGS      Cardiovascular Screening:    General: History Lipid Disorder and Screening Current      Diabetes Screening:     General: Screening Current      Colorectal Cancer Screening:     General: Patient Declines      Breast Cancer Screening:     General: Patient Declines      Cervical Cancer Screening:    General: Screening Not Indicated      Osteoporosis Screening:    General: Patient Declines      Abdominal Aortic Aneurysm (AAA) Screening:        General: Screening Current      Lung Cancer Screening:     General: Patient Declines      Hepatitis C Screening:    General: Screening Current    Screening, Brief Intervention, and Referral to Treatment (SBIRT)  "    Screening      Single Item Drug Screening:  How often have you used an illegal drug (including marijuana) or a prescription medication for non-medical reasons in the past year? never    Single Item Drug Screen Score: 0  Interpretation: Negative screen for possible drug use disorder    Brief Intervention  Alcohol & drug use screenings were reviewed. No concerns regarding substance use disorder identified.     SDOH Risk Assessment  Social determinants of health (SDOH) risk assesment tool was completed. The tool at a minimum covered housing stability, food insecurity, transportation needs, and utility difficulty. Patient had at risk responses for the following SDOH domains: transportation needs.     Other Counseling Topics:   Car/seat belt/driving safety, skin self-exam, sunscreen and regular weightbearing exercise and calcium and vitamin D intake.     Social Drivers of Health     Financial Resource Strain: Low Risk  (11/10/2022)    Overall Financial Resource Strain (CARDIA)     Difficulty of Paying Living Expenses: Not very hard   Food Insecurity: No Food Insecurity (3/28/2025)    Hunger Vital Sign     Worried About Running Out of Food in the Last Year: Never true     Ran Out of Food in the Last Year: Never true   Transportation Needs: Unmet Transportation Needs (3/28/2025)    PRAPARE - Transportation     Lack of Transportation (Medical): Yes     Lack of Transportation (Non-Medical): Yes   Housing Stability: Low Risk  (3/28/2025)    Housing Stability Vital Sign     Unable to Pay for Housing in the Last Year: No     Number of Times Moved in the Last Year: 0     Homeless in the Last Year: No   Utilities: Not At Risk (3/28/2025)    [facility] Utilities     Threatened with loss of utilities: No     No results found.    Objective   /90 (BP Location: Left arm, Patient Position: Sitting, Cuff Size: Standard)   Pulse (!) 110   Temp 98.4 °F (36.9 °C) (Tympanic)   Resp 17   Ht 5' 5\" (1.651 m)   Wt 54.3 kg (119 lb 9.6 " oz)   SpO2 99%   BMI 19.90 kg/m²     Physical Exam  Vitals and nursing note reviewed.   Constitutional:       Appearance: Normal appearance. She is well-developed.   HENT:      Head: Normocephalic.      Right Ear: External ear normal.      Left Ear: External ear normal.      Nose: Nose normal.   Eyes:      Conjunctiva/sclera: Conjunctivae normal.      Pupils: Pupils are equal, round, and reactive to light.   Cardiovascular:      Rate and Rhythm: Normal rate and regular rhythm.      Heart sounds: Normal heart sounds.   Pulmonary:      Effort: Pulmonary effort is normal.      Breath sounds: Normal breath sounds.   Abdominal:      General: Bowel sounds are normal.      Palpations: Abdomen is soft.   Musculoskeletal:         General: Normal range of motion.      Cervical back: Normal range of motion and neck supple.      Comments: Decreased range of motion of left shoulder    Skin:     General: Skin is warm and dry.   Neurological:      General: No focal deficit present.      Mental Status: She is alert and oriented to person, place, and time.   Psychiatric:         Behavior: Behavior normal.         Thought Content: Thought content normal.         Judgment: Judgment normal.

## 2025-03-31 ENCOUNTER — PATIENT OUTREACH (OUTPATIENT)
Dept: CASE MANAGEMENT | Facility: OTHER | Age: 75
End: 2025-03-31
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
## 2025-07-01 DIAGNOSIS — M10.9 GOUT, UNSPECIFIED CAUSE, UNSPECIFIED CHRONICITY, UNSPECIFIED SITE: ICD-10-CM

## 2025-07-01 RX ORDER — ALLOPURINOL 100 MG/1
100 TABLET ORAL DAILY
Qty: 90 TABLET | Refills: 1 | Status: SHIPPED | OUTPATIENT
Start: 2025-07-01

## 2025-07-01 NOTE — TELEPHONE ENCOUNTER
Medication: allopurinol (ZYLOPRIM) 100 mg tablet     Dose/Frequency:     TAKE 1 TABLET BY MOUTH EVERY DAY       Quantity: 90    Pharmacy: [facility]/pharmacy #[id number] [address]     Office:   [x] PCP/Provider -   [] Speciality/Provider -     Does the patient have enough for 3 days?   [x] Yes   [] No - Send as HP to POD

## 2025-07-01 NOTE — TELEPHONE ENCOUNTER
Patient cancelled today's appointment because patient fell and needs to go for x-ray and Bone scan. Patient stated that she is hurting all over. Ambulance came to pick her up. She does not have anyone to drive her. Please call to advise because I did not see any appointment with Dr. Minor till September.  Patient has scripts for both x- ray and scan given to her in March by Dr. Minor. It says they  in . Please check.  Please call to schedule accordingly.    Thank you!!

## 2025-07-07 ENCOUNTER — RA CDI HCC (OUTPATIENT)
Dept: OTHER | Facility: HOSPITAL | Age: 75
End: 2025-07-07

## 2025-07-15 ENCOUNTER — OFFICE VISIT (OUTPATIENT)
Dept: FAMILY MEDICINE CLINIC | Facility: CLINIC | Age: 75
End: 2025-07-15
Payer: MEDICARE

## 2025-07-15 ENCOUNTER — APPOINTMENT (OUTPATIENT)
Dept: RADIOLOGY | Facility: CLINIC | Age: 75
End: 2025-07-15
Attending: FAMILY MEDICINE
Payer: MEDICARE

## 2025-07-15 VITALS
RESPIRATION RATE: 17 BRPM | SYSTOLIC BLOOD PRESSURE: 152 MMHG | HEIGHT: 65 IN | BODY MASS INDEX: 19.03 KG/M2 | HEART RATE: 90 BPM | DIASTOLIC BLOOD PRESSURE: 98 MMHG | WEIGHT: 114.2 LBS | OXYGEN SATURATION: 97 % | TEMPERATURE: 99.2 F

## 2025-07-15 DIAGNOSIS — M85.9 LOW BONE DENSITY: ICD-10-CM

## 2025-07-15 DIAGNOSIS — Z91.81 HISTORY OF RECENT FALL: Primary | ICD-10-CM

## 2025-07-15 DIAGNOSIS — I10 PRIMARY HYPERTENSION: ICD-10-CM

## 2025-07-15 DIAGNOSIS — R00.0 TACHYCARDIA: ICD-10-CM

## 2025-07-15 DIAGNOSIS — S49.91XS ARM INJURY, RIGHT, SEQUELA: ICD-10-CM

## 2025-07-15 DIAGNOSIS — S49.92XS: ICD-10-CM

## 2025-07-15 DIAGNOSIS — Z91.81 HISTORY OF RECENT FALL: ICD-10-CM

## 2025-07-15 DIAGNOSIS — M85.88 OTHER SPECIFIED DISORDERS OF BONE DENSITY AND STRUCTURE, OTHER SITE: ICD-10-CM

## 2025-07-15 PROCEDURE — 72100 X-RAY EXAM L-S SPINE 2/3 VWS: CPT

## 2025-07-15 PROCEDURE — G2211 COMPLEX E/M VISIT ADD ON: HCPCS | Performed by: STUDENT IN AN ORGANIZED HEALTH CARE EDUCATION/TRAINING PROGRAM

## 2025-07-15 PROCEDURE — 73030 X-RAY EXAM OF SHOULDER: CPT

## 2025-07-15 PROCEDURE — 73060 X-RAY EXAM OF HUMERUS: CPT

## 2025-07-15 PROCEDURE — 99214 OFFICE O/P EST MOD 30 MIN: CPT | Performed by: STUDENT IN AN ORGANIZED HEALTH CARE EDUCATION/TRAINING PROGRAM

## 2025-07-15 PROCEDURE — 73502 X-RAY EXAM HIP UNI 2-3 VIEWS: CPT

## 2025-07-17 ENCOUNTER — TELEPHONE (OUTPATIENT)
Age: 75
End: 2025-07-17

## 2025-07-24 ENCOUNTER — HOSPITAL ENCOUNTER (OUTPATIENT)
Dept: BONE DENSITY | Facility: CLINIC | Age: 75
Discharge: HOME | End: 2025-07-24
Attending: STUDENT IN AN ORGANIZED HEALTH CARE EDUCATION/TRAINING PROGRAM
Payer: MEDICARE

## 2025-07-24 VITALS — WEIGHT: 114 LBS | BODY MASS INDEX: 20.98 KG/M2 | HEIGHT: 62 IN

## 2025-07-24 DIAGNOSIS — M85.9 LOW BONE DENSITY: ICD-10-CM

## 2025-07-24 DIAGNOSIS — M85.88 OTHER SPECIFIED DISORDERS OF BONE DENSITY AND STRUCTURE, OTHER SITE: ICD-10-CM

## 2025-07-24 PROCEDURE — 77080 DXA BONE DENSITY AXIAL: CPT

## 2025-07-28 ENCOUNTER — PATIENT MESSAGE (OUTPATIENT)
Dept: FAMILY MEDICINE CLINIC | Facility: CLINIC | Age: 75
End: 2025-07-28

## 2025-07-28 ENCOUNTER — NURSE TRIAGE (OUTPATIENT)
Age: 75
End: 2025-07-28

## 2025-08-06 ENCOUNTER — TELEPHONE (OUTPATIENT)
Age: 75
End: 2025-08-06

## 2025-08-07 ENCOUNTER — OFFICE VISIT (OUTPATIENT)
Dept: FAMILY MEDICINE CLINIC | Facility: CLINIC | Age: 75
End: 2025-08-07
Payer: MEDICARE

## 2025-08-07 ENCOUNTER — APPOINTMENT (OUTPATIENT)
Dept: LAB | Facility: CLINIC | Age: 75
End: 2025-08-07
Payer: MEDICARE

## 2025-08-07 VITALS
TEMPERATURE: 97.9 F | HEIGHT: 62 IN | BODY MASS INDEX: 20.87 KG/M2 | HEART RATE: 92 BPM | SYSTOLIC BLOOD PRESSURE: 158 MMHG | WEIGHT: 113.4 LBS | RESPIRATION RATE: 16 BRPM | DIASTOLIC BLOOD PRESSURE: 110 MMHG | OXYGEN SATURATION: 97 %

## 2025-08-07 DIAGNOSIS — M81.0 AGE-RELATED OSTEOPOROSIS WITHOUT CURRENT PATHOLOGICAL FRACTURE: ICD-10-CM

## 2025-08-07 DIAGNOSIS — I10 PRIMARY HYPERTENSION: Primary | ICD-10-CM

## 2025-08-07 PROCEDURE — G2211 COMPLEX E/M VISIT ADD ON: HCPCS

## 2025-08-07 PROCEDURE — 99214 OFFICE O/P EST MOD 30 MIN: CPT

## 2025-08-07 RX ORDER — LOSARTAN POTASSIUM 50 MG/1
50 TABLET ORAL DAILY
Qty: 30 TABLET | Refills: 5 | Status: SHIPPED | OUTPATIENT
Start: 2025-08-07

## 2025-08-11 ENCOUNTER — TELEPHONE (OUTPATIENT)
Age: 75
End: 2025-08-11

## (undated) DEVICE — SYRINGE 3ML LL

## (undated) DEVICE — GLOVE SRG BIOGEL 8.5

## (undated) DEVICE — GLOVE INDICATOR PI UNDERGLOVE SZ 6.5 BLUE

## (undated) DEVICE — SPONGE SCRUB 4 PCT CHLORHEXIDINE

## (undated) DEVICE — SCD SEQUENTIAL COMPRESSION COMFORT SLEEVE MEDIUM KNEE LENGTH: Brand: KENDALL SCD

## (undated) DEVICE — INTENDED FOR TISSUE SEPARATION, AND OTHER PROCEDURES THAT REQUIRE A SHARP SURGICAL BLADE TO PUNCTURE OR CUT.: Brand: BARD-PARKER SAFETY BLADES SIZE 10, STERILE

## (undated) DEVICE — CHLORAPREP HI-LITE 26ML ORANGE

## (undated) DEVICE — GLOVE INDICATOR PI UNDERGLOVE SZ 8.5 BLUE

## (undated) DEVICE — 450 ML BOTTLE OF 0.05% CHLORHEXIDINE GLUCONATE IN 99.95% STERILE WATER FOR IRRIGATION, USP AND APPLICATOR.: Brand: IRRISEPT ANTIMICROBIAL WOUND LAVAGE

## (undated) DEVICE — COBAN 6 IN STERILE

## (undated) DEVICE — PAD GROUNDING ADULT

## (undated) DEVICE — ADHESIVE SKIN HIGH VISCOSITY EXOFIN 1ML

## (undated) DEVICE — HEAVY DUTY TABLE COVER: Brand: CONVERTORS

## (undated) DEVICE — IMPERVIOUS STOCKINETTE: Brand: DEROYAL

## (undated) DEVICE — SUT VICRYL 0 CT-1 27 IN J260H

## (undated) DEVICE — HOOD WITH PEEL AWAY FACE SHIELD: Brand: T7PLUS

## (undated) DEVICE — SUT STRATAFIX SPIRAL 1-0 PDO 36 X 36 CM SXPD2B405

## (undated) DEVICE — GLOVE SRG BIOGEL 6.5

## (undated) DEVICE — PACK MAJOR ORTHO W/SPLITS PBDS

## (undated) DEVICE — ANTIBACTERIAL VIOLET BRAIDED (POLYGLACTIN 910), SYNTHETIC ABSORBABLE SURGICAL SUTURE: Brand: COATED VICRYL

## (undated) DEVICE — SYRINGE 30ML LL

## (undated) DEVICE — 3M™ STERI-DRAPE™ U-DRAPE 1015: Brand: STERI-DRAPE™

## (undated) DEVICE — ASTOUND STANDARD SURGICAL GOWN, XXL: Brand: CONVERTORS

## (undated) DEVICE — HANDPIECE SET WITH RETRACTABLE COAXIAL FAN SPRAY TIP AND SUCTION TUBE: Brand: INTERPULSE

## (undated) DEVICE — PLUMEPEN PRO 10FT

## (undated) DEVICE — DRESSING MEPILEX AG BORDER POST-OP 4 X 12 IN

## (undated) DEVICE — YELLOW BOAT

## (undated) DEVICE — SUT VICRYL 1 CP-1 27 IN J268H

## (undated) DEVICE — NEEDLE 18 G X 1 1/2

## (undated) DEVICE — SUT VICRYL 2-0 CT-1 27 IN J259H

## (undated) DEVICE — GLOVE SRG BIOGEL ORTHOPEDIC 8.5

## (undated) DEVICE — 3M™ IOBAN™ 2 ANTIMICROBIAL INCISE DRAPE 6650EZ: Brand: IOBAN™ 2

## (undated) DEVICE — TIBURON HIP DRAPE WITH POUCHES: Brand: CONVERTORS

## (undated) DEVICE — SUT STRATAFIX SPIRAL 3-0 PGA/PCL 30 X 30 CM SXMD2B408

## (undated) DEVICE — HOOD: Brand: T7PLUS

## (undated) DEVICE — ELECTRODE BLADE MOD  E-Z CLEAN 6.5IN -0014M

## (undated) DEVICE — DRESSING MEPILEX AG BORDER 4 X 12 IN